# Patient Record
Sex: FEMALE | Race: WHITE | NOT HISPANIC OR LATINO | Employment: OTHER | URBAN - METROPOLITAN AREA
[De-identification: names, ages, dates, MRNs, and addresses within clinical notes are randomized per-mention and may not be internally consistent; named-entity substitution may affect disease eponyms.]

---

## 2017-03-22 ENCOUNTER — APPOINTMENT (OUTPATIENT)
Dept: LAB | Facility: HOSPITAL | Age: 82
End: 2017-03-22

## 2017-03-22 ENCOUNTER — CONSULT (OUTPATIENT)
Dept: ONCOLOGY | Facility: CLINIC | Age: 82
End: 2017-03-22

## 2017-03-22 VITALS
BODY MASS INDEX: 24.94 KG/M2 | TEMPERATURE: 97.9 F | RESPIRATION RATE: 20 BRPM | WEIGHT: 127 LBS | DIASTOLIC BLOOD PRESSURE: 88 MMHG | HEIGHT: 60 IN | SYSTOLIC BLOOD PRESSURE: 169 MMHG | HEART RATE: 63 BPM

## 2017-03-22 DIAGNOSIS — D64.9 NORMOCYTIC ANEMIA: Primary | ICD-10-CM

## 2017-03-22 LAB
ALBUMIN SERPL-MCNC: 4.4 G/DL (ref 3.2–4.8)
ALBUMIN/GLOB SERPL: 1.5 G/DL (ref 1.5–2.5)
ALP SERPL-CCNC: 81 U/L (ref 25–100)
ALT SERPL W P-5'-P-CCNC: 13 U/L (ref 7–40)
ANION GAP SERPL CALCULATED.3IONS-SCNC: 4 MMOL/L (ref 3–11)
AST SERPL-CCNC: 19 U/L (ref 0–33)
BASOPHILS # BLD AUTO: 0.06 10*3/MM3 (ref 0–0.2)
BASOPHILS NFR BLD AUTO: 1 % (ref 0–1)
BILIRUB SERPL-MCNC: 0.3 MG/DL (ref 0.3–1.2)
BUN BLD-MCNC: 28 MG/DL (ref 9–23)
BUN/CREAT SERPL: 21.5 (ref 7–25)
CALCIUM SPEC-SCNC: 9.9 MG/DL (ref 8.7–10.4)
CHLORIDE SERPL-SCNC: 107 MMOL/L (ref 99–109)
CO2 SERPL-SCNC: 31 MMOL/L (ref 20–31)
CREAT BLD-MCNC: 1.3 MG/DL (ref 0.6–1.3)
DEPRECATED RDW RBC AUTO: 53.2 FL (ref 37–54)
EOSINOPHIL # BLD AUTO: 0.09 10*3/MM3 (ref 0.1–0.3)
EOSINOPHIL NFR BLD AUTO: 1.5 % (ref 0–3)
ERYTHROCYTE [DISTWIDTH] IN BLOOD BY AUTOMATED COUNT: 15.2 % (ref 11.3–14.5)
FERRITIN SERPL-MCNC: 19 NG/ML (ref 10–291)
FOLATE SERPL-MCNC: 7.66 NG/ML (ref 3.2–20)
GFR SERPL CREATININE-BSD FRML MDRD: 39 ML/MIN/1.73
GLOBULIN UR ELPH-MCNC: 3 GM/DL
GLUCOSE BLD-MCNC: 138 MG/DL (ref 70–100)
HCT VFR BLD AUTO: 29.1 % (ref 34.5–44)
HGB BLD-MCNC: 8.9 G/DL (ref 11.5–15.5)
IMM GRANULOCYTES # BLD: 0 10*3/MM3 (ref 0–0.03)
IMM GRANULOCYTES NFR BLD: 0 % (ref 0–0.6)
IRON 24H UR-MRATE: 49 MCG/DL (ref 50–175)
IRON SATN MFR SERPL: 12 % (ref 15–50)
LDH SERPL-CCNC: 176 U/L (ref 120–246)
LYMPHOCYTES # BLD AUTO: 1.23 10*3/MM3 (ref 0.6–4.8)
LYMPHOCYTES NFR BLD AUTO: 20.9 % (ref 24–44)
MCH RBC QN AUTO: 29.3 PG (ref 27–31)
MCHC RBC AUTO-ENTMCNC: 30.6 G/DL (ref 32–36)
MCV RBC AUTO: 95.7 FL (ref 80–99)
MONOCYTES # BLD AUTO: 0.5 10*3/MM3 (ref 0–1)
MONOCYTES NFR BLD AUTO: 8.5 % (ref 0–12)
NEUTROPHILS # BLD AUTO: 4.01 10*3/MM3 (ref 1.5–8.3)
NEUTROPHILS NFR BLD AUTO: 68.1 % (ref 41–71)
PLATELET # BLD AUTO: 151 10*3/MM3 (ref 150–450)
PMV BLD AUTO: 11.2 FL (ref 6–12)
POTASSIUM BLD-SCNC: 4.8 MMOL/L (ref 3.5–5.5)
PROT SERPL-MCNC: 7.4 G/DL (ref 5.7–8.2)
RBC # BLD AUTO: 3.04 10*6/MM3 (ref 3.89–5.14)
RETICS/RBC NFR AUTO: 1.42 % (ref 0.5–1.5)
SODIUM BLD-SCNC: 142 MMOL/L (ref 132–146)
TIBC SERPL-MCNC: 401 MCG/DL (ref 250–450)
VIT B12 BLD-MCNC: 365 PG/ML (ref 211–911)
WBC NRBC COR # BLD: 5.89 10*3/MM3 (ref 3.5–10.8)

## 2017-03-22 PROCEDURE — 36415 COLL VENOUS BLD VENIPUNCTURE: CPT | Performed by: INTERNAL MEDICINE

## 2017-03-22 PROCEDURE — 83550 IRON BINDING TEST: CPT | Performed by: INTERNAL MEDICINE

## 2017-03-22 PROCEDURE — 84155 ASSAY OF PROTEIN SERUM: CPT | Performed by: INTERNAL MEDICINE

## 2017-03-22 PROCEDURE — 83615 LACTATE (LD) (LDH) ENZYME: CPT | Performed by: INTERNAL MEDICINE

## 2017-03-22 PROCEDURE — 82607 VITAMIN B-12: CPT | Performed by: INTERNAL MEDICINE

## 2017-03-22 PROCEDURE — 84165 PROTEIN E-PHORESIS SERUM: CPT | Performed by: INTERNAL MEDICINE

## 2017-03-22 PROCEDURE — 85025 COMPLETE CBC W/AUTO DIFF WBC: CPT | Performed by: INTERNAL MEDICINE

## 2017-03-22 PROCEDURE — 83540 ASSAY OF IRON: CPT | Performed by: INTERNAL MEDICINE

## 2017-03-22 PROCEDURE — 99204 OFFICE O/P NEW MOD 45 MIN: CPT | Performed by: INTERNAL MEDICINE

## 2017-03-22 PROCEDURE — 82746 ASSAY OF FOLIC ACID SERUM: CPT | Performed by: INTERNAL MEDICINE

## 2017-03-22 PROCEDURE — 82728 ASSAY OF FERRITIN: CPT | Performed by: INTERNAL MEDICINE

## 2017-03-22 PROCEDURE — 85045 AUTOMATED RETICULOCYTE COUNT: CPT | Performed by: INTERNAL MEDICINE

## 2017-03-22 PROCEDURE — 80053 COMPREHEN METABOLIC PANEL: CPT | Performed by: INTERNAL MEDICINE

## 2017-03-22 PROCEDURE — 82668 ASSAY OF ERYTHROPOIETIN: CPT | Performed by: INTERNAL MEDICINE

## 2017-03-22 PROCEDURE — 86334 IMMUNOFIX E-PHORESIS SERUM: CPT | Performed by: INTERNAL MEDICINE

## 2017-03-22 NOTE — PROGRESS NOTES
Subjective     PROBLEM LIST:  1. Normocytic anemia  2. Hypothyroidism  3. Diabetes with neuropathy  4. Osteoarthritis  5. CAD  6. CKD  7. Depression/ BPAD  8. Seizure disorder      CHIEF COMPLAINT: anemia      HISTORY OF PRESENT ILLNESS:  The patient is a 88 y.o. year old female, referred for evaluation of anemia.  Her daughter reports that they have been dealing with anemia for the past few years.  She has taken iron in the past but is not on it currently.She does report fatigue and   has been weaker with activity.  She had a colonoscopy in February 2016 which was reported as normal.  She also had an EGD in 2016  which did show peptic ulcers which has now been treated.  She has never seen blood in her bowel movements.  She has not had side effects with oral iron.    REVIEW OF SYSTEMS:  A 14 point review of systems was performed and is negative except as noted above.    Past Medical History:   Diagnosis Date   • Anemia    • Anxiety    • Arthritis    • Arthritis    • Depression    • Diabetes mellitus    • Stroke     x4 over a span of about 30 years   • Thyroid disorder    • Ulcer          Current Outpatient Prescriptions on File Prior to Visit   Medication Sig Dispense Refill   • carvedilol (COREG) 12.5 MG tablet Take 6.25 mg by mouth 2 (Two) Times a Day.     • furosemide (LASIX) 40 MG tablet 40 mg 2 (Two) Times a Week. Monday & Thursday     • glipiZIDE (GLUCOTROL) 10 MG tablet Take 10 mg by mouth 2 (Two) Times a Day.     • levothyroxine (SYNTHROID, LEVOTHROID) 50 MCG tablet Take 50 mcg by mouth Daily.     • lithium carbonate 150 MG capsule Take 150 mg by mouth Daily.     • metFORMIN (GLUCOPHAGE) 500 MG tablet Take 500 mg by mouth Daily.     • nitroglycerin (NITROSTAT) 0.4 MG SL tablet Place 0.4 mg under the tongue Every 5 (Five) Minutes As Needed for chest pain. Take no more than 3 doses in 15 minutes.     • pantoprazole (PROTONIX) 40 MG EC tablet Take 40 mg by mouth Daily.     • potassium chloride (MICRO-K) 10  "MEQ CR capsule Take 10 mEq by mouth 2 (Two) Times a Week. Monday & Thursday     • QUEtiapine (SEROquel) 25 MG tablet Take 50 mg by mouth 2 (Two) Times a Day.       No current facility-administered medications on file prior to visit.        Allergies   Allergen Reactions   • Penicillins Hives   • Percodan [Oxycodone-Aspirin]      Headaces       Past Surgical History:   Procedure Laterality Date   • APPENDECTOMY     • ARTERIAL BYPASS SURGERY     • BACK SURGERY     • CARDIAC CATHETERIZATION     • CATARACT EXTRACTION Bilateral    •  SECTION      x4    • CHOLECYSTECTOMY     • COLONOSCOPY  2016   • CORONARY ANGIOPLASTY WITH STENT PLACEMENT     • HEMORRHOIDECTOMY      x2   • HYSTERECTOMY     • RESECTION RIBS EXTRAPLEURAL         Social History     Social History   • Marital status:      Spouse name: N/A   • Number of children: N/A   • Years of education: N/A     Social History Main Topics   • Smoking status: Never Smoker   • Smokeless tobacco: Never Used   • Alcohol use No   • Drug use: No   • Sexual activity: Defer     Other Topics Concern   • None     Social History Narrative   She lives with her  with whom she recently celebrated a 70th wedding anniversary.  She is accompanied by her daughter today.    Family History   Problem Relation Age of Onset   • Diabetes Mother    • Heart disease Mother    • Diabetes Father    • Diabetes Sister    • Heart attack Brother    • Heart disease Sister    • Heart disease Sister    • Heart failure Sister        Objective     /88  Pulse 63  Temp 97.9 °F (36.6 °C) (Temporal Artery )   Resp 20  Ht 60\" (152.4 cm)  Wt 127 lb (57.6 kg)  BMI 24.8 kg/m2  Performance Status: 1  General: well appearing female in no acute distress  Neuro: alert and oriented  HEENT: sclera anicteric, oropharynx clear  Lymphatics: no cervical, supraclavicular, or axillary adenopathy  Cardiovascular: regular rate and rhythm, no murmurs  Lungs: clear to auscultation " bilaterally  Abdomen: soft, nontender, nondistended.  No palpable organomegaly  Extremeties: no lower extremity edema  Skin: no rashes, lesions, bruising, or petechiae  Psych: mood and affect appropriate       Labs: On 2/23/2017 her hemoglobin was 8.5, MCV 93, platelets 134.  Creatinine is 1.37.      Labs obtained today show an LDH of 176, creatinine 1.3, serum iron 49, ferritin 19, iron saturation 12%, B12 365, hemoglobin 8.9, MCV 95, platelets 151.      Assessment/Plan     Erma West is a 88 y.o. year old female Who presents for evaluation of normocytic anemia.  She has had GI evaluation and treatment of peptic ulcers, but remains anemic.  Blood work today does show somewhat low iron levels site it's reasonable to try iron supplementation as a first step.  It is also possible that there is another issue such as myelodysplastic syndrome.  Her renal insufficiency may be playing a role in this as well.  For now I will recommend that she take ferrous sulfate 325 mg twice daily and I will see her back in about a month to recheck her labs.           Kate Dodson MD    3/22/2017

## 2017-03-23 LAB
ALBUMIN SERPL-MCNC: 3.9 G/DL (ref 2.9–4.4)
ALBUMIN/GLOB SERPL: 1.3 {RATIO} (ref 0.7–1.7)
ALPHA1 GLOB FLD ELPH-MCNC: 0.2 G/DL (ref 0–0.4)
ALPHA2 GLOB SERPL ELPH-MCNC: 0.7 G/DL (ref 0.4–1)
B-GLOBULIN SERPL ELPH-MCNC: 1 G/DL (ref 0.7–1.3)
ETHNIC BACKGROUND STATED: 17.4 MIU/ML (ref 2.6–18.5)
GAMMA GLOB SERPL ELPH-MCNC: 1.2 G/DL (ref 0.4–1.8)
GLOBULIN SER CALC-MCNC: 3.1 G/DL (ref 2.2–3.9)
IGA SERPL-MCNC: 134 MG/DL (ref 64–422)
IGG SERPL-MCNC: 929 MG/DL (ref 700–1600)
IGM SERPL-MCNC: 279 MG/DL (ref 26–217)
INTERPRETATION SERPL IEP-IMP: ABNORMAL
Lab: ABNORMAL
M-SPIKE: ABNORMAL G/DL
PROT SERPL-MCNC: 7 G/DL (ref 6–8.5)

## 2017-04-19 ENCOUNTER — RESULTS ENCOUNTER (OUTPATIENT)
Dept: ONCOLOGY | Facility: CLINIC | Age: 82
End: 2017-04-19

## 2017-04-19 ENCOUNTER — OFFICE VISIT (OUTPATIENT)
Dept: ONCOLOGY | Facility: CLINIC | Age: 82
End: 2017-04-19

## 2017-04-19 ENCOUNTER — APPOINTMENT (OUTPATIENT)
Dept: LAB | Facility: HOSPITAL | Age: 82
End: 2017-04-19

## 2017-04-19 ENCOUNTER — TELEPHONE (OUTPATIENT)
Dept: ONCOLOGY | Facility: CLINIC | Age: 82
End: 2017-04-19

## 2017-04-19 VITALS
WEIGHT: 124 LBS | HEIGHT: 60 IN | DIASTOLIC BLOOD PRESSURE: 66 MMHG | SYSTOLIC BLOOD PRESSURE: 159 MMHG | TEMPERATURE: 98.1 F | HEART RATE: 57 BPM | RESPIRATION RATE: 18 BRPM | BODY MASS INDEX: 24.35 KG/M2

## 2017-04-19 DIAGNOSIS — D64.9 NORMOCYTIC ANEMIA: Primary | ICD-10-CM

## 2017-04-19 DIAGNOSIS — D64.9 NORMOCYTIC ANEMIA: ICD-10-CM

## 2017-04-19 DIAGNOSIS — D50.9 IRON DEFICIENCY ANEMIA, UNSPECIFIED IRON DEFICIENCY ANEMIA TYPE: Primary | ICD-10-CM

## 2017-04-19 LAB
ERYTHROCYTE [DISTWIDTH] IN BLOOD BY AUTOMATED COUNT: 16.6 % (ref 11.3–14.5)
FERRITIN SERPL-MCNC: 30 NG/ML (ref 10–291)
HCT VFR BLD AUTO: 28.4 % (ref 34.5–44)
HGB BLD-MCNC: 9.2 G/DL (ref 11.5–15.5)
IRON 24H UR-MRATE: 162 MCG/DL (ref 50–175)
IRON SATN MFR SERPL: 46 % (ref 15–50)
LYMPHOCYTES # BLD AUTO: 1.7 10*3/MM3 (ref 0.6–4.8)
LYMPHOCYTES NFR BLD AUTO: 24.5 % (ref 24–44)
MCH RBC QN AUTO: 29.4 PG (ref 27–31)
MCHC RBC AUTO-ENTMCNC: 32.3 G/DL (ref 32–36)
MCV RBC AUTO: 91 FL (ref 80–99)
MONOCYTES # BLD AUTO: 0.6 10*3/MM3 (ref 0–1)
MONOCYTES NFR BLD AUTO: 9 % (ref 0–12)
NEUTROPHILS # BLD AUTO: 4.5 10*3/MM3 (ref 1.5–8.3)
NEUTROPHILS NFR BLD AUTO: 66.5 % (ref 41–71)
PLATELET # BLD AUTO: 147 10*3/MM3 (ref 150–450)
PMV BLD AUTO: 8 FL (ref 6–12)
RBC # BLD AUTO: 3.12 10*6/MM3 (ref 3.89–5.14)
TIBC SERPL-MCNC: 355 MCG/DL (ref 250–450)
WBC NRBC COR # BLD: 6.8 10*3/MM3 (ref 3.5–10.8)

## 2017-04-19 PROCEDURE — 83540 ASSAY OF IRON: CPT | Performed by: INTERNAL MEDICINE

## 2017-04-19 PROCEDURE — 36415 COLL VENOUS BLD VENIPUNCTURE: CPT | Performed by: INTERNAL MEDICINE

## 2017-04-19 PROCEDURE — 83550 IRON BINDING TEST: CPT | Performed by: INTERNAL MEDICINE

## 2017-04-19 PROCEDURE — 82728 ASSAY OF FERRITIN: CPT | Performed by: INTERNAL MEDICINE

## 2017-04-19 PROCEDURE — 85025 COMPLETE CBC W/AUTO DIFF WBC: CPT | Performed by: INTERNAL MEDICINE

## 2017-04-19 PROCEDURE — 99213 OFFICE O/P EST LOW 20 MIN: CPT | Performed by: INTERNAL MEDICINE

## 2017-04-19 RX ORDER — DICYCLOMINE HYDROCHLORIDE 10 MG/1
10 CAPSULE ORAL 2 TIMES DAILY
COMMUNITY
Start: 2017-04-01

## 2017-04-19 RX ORDER — CARVEDILOL 6.25 MG/1
12.5 TABLET ORAL
COMMUNITY
Start: 2017-03-22 | End: 2018-01-01 | Stop reason: SDDI

## 2017-04-19 RX ORDER — QUETIAPINE FUMARATE 50 MG/1
TABLET, FILM COATED ORAL
COMMUNITY
Start: 2017-04-06 | End: 2017-11-22

## 2017-04-19 NOTE — TELEPHONE ENCOUNTER
I called the patient's daughter regarding the results of today's blood work.  Her iron levels have improved, but she did not have a corresponding improvement in her hemoglobin.  This suggests that there may be more to her anemia than just iron deficiency.  At this point I think a bone marrow biopsy would be unlikely to change our management.  Additionally the patient was pretty clear that she didn't want to have any invasive procedures.  I recommend that we just follow her blood counts for now.  The daughter said she would like to follow up with hematology clinic specifically so I will have her return in about 6 months.

## 2017-04-19 NOTE — PROGRESS NOTES
"      PROBLEM LIST:  1. Normocytic anemia  2. Hypothyroidism  3. Diabetes with neuropathy  4. Osteoarthritis  5. CAD  6. CKD  7. Depression/ BPAD  8. Seizure disorder     Subjective     HISTORY OF PRESENT ILLNESS:   Erma West returns for follow-up.   She has been taking iron twice a day for the past month.  She has not had any side effects with this.  Her energy level has been low.  She spends a lot of the day resting.    Past Medical History, Past Surgical History, Social History, Family History have been reviewed and are without significant changes except as mentioned.    Review of Systems   A comprehensive 14 point review of systems was performed and was negative except as mentioned.    Medications:  The current medication list was reviewed in the EMR    ALLERGIES:    Allergies   Allergen Reactions   • Penicillins Hives   • Percodan [Oxycodone-Aspirin]      Headaces       Objective      /66  Pulse 57  Temp 98.1 °F (36.7 °C)  Resp 18  Ht 60\" (152.4 cm)  Wt 124 lb (56.2 kg)  BMI 24.22 kg/m2     Performance Status: 2    General: well appearing female in no acute distress  Neuro: alert and oriented  HEENT: sclera anicteric, oropharynx clear  Lymphatics: no cervical, supraclavicular, or axillary adenopathy  Cardiovascular: regular rate and rhythm, no murmurs  Lungs: clear to auscultation bilaterally  Abdomen: soft, nontender, nondistended.  No palpable organomegaly  Extremeties: no lower extremity edema  Skin: no rashes, lesions, bruising, or petechiae  Psych: mood and affect appropriate      RECENT LABS:  Blood work was reviewed and is notable for hemoglobin 9.2.  MCV is 91.  Platelets are 147, white count 6.8.          Assessment/Plan   Erma West is a 88 y.o. year old female with normocytic anemia.  She had low iron levels and returns today after a month trial of oral iron.  She has not had any significant improvement in her hemoglobin.  I will wait for her iron levels to return.  If these have " not increased, then I would recommend trying Feraheme.  If her iron levels are increased but her anemia is no better, then there may be more than just iron deficiency is the underlying cause.  I briefly mentioned a bone marrow biopsy and she is not interested in having any invasive procedures, which I think is very reasonable.  I will contact her daughter by phone once the remainder of the labs are available.  Follow-up will be scheduled depending on these results.                  Visit time was 15 minutes, greater than 50% spent in counseling      Kate Dodson MD  [facility]    4/19/2017          CC:

## 2017-10-17 ENCOUNTER — RESULTS ENCOUNTER (OUTPATIENT)
Dept: ONCOLOGY | Facility: CLINIC | Age: 82
End: 2017-10-17

## 2017-10-17 DIAGNOSIS — D50.9 IRON DEFICIENCY ANEMIA, UNSPECIFIED IRON DEFICIENCY ANEMIA TYPE: ICD-10-CM

## 2017-11-22 ENCOUNTER — OFFICE VISIT (OUTPATIENT)
Dept: ONCOLOGY | Facility: CLINIC | Age: 82
End: 2017-11-22

## 2017-11-22 VITALS
BODY MASS INDEX: 23.56 KG/M2 | TEMPERATURE: 97 F | DIASTOLIC BLOOD PRESSURE: 88 MMHG | WEIGHT: 120 LBS | HEART RATE: 70 BPM | RESPIRATION RATE: 16 BRPM | HEIGHT: 60 IN | SYSTOLIC BLOOD PRESSURE: 215 MMHG

## 2017-11-22 DIAGNOSIS — D64.9 NORMOCYTIC ANEMIA: Primary | ICD-10-CM

## 2017-11-22 PROCEDURE — 99213 OFFICE O/P EST LOW 20 MIN: CPT | Performed by: INTERNAL MEDICINE

## 2017-11-22 RX ORDER — LEVOTHYROXINE SODIUM 0.07 MG/1
TABLET ORAL
COMMUNITY
Start: 2017-10-31 | End: 2018-01-01 | Stop reason: SDDI

## 2017-11-22 RX ORDER — QUETIAPINE FUMARATE 100 MG/1
100 TABLET, FILM COATED ORAL NIGHTLY
COMMUNITY
Start: 2017-10-25

## 2017-11-22 NOTE — PROGRESS NOTES
"      PROBLEM LIST:  1. Normocytic anemia  2. Hypothyroidism  3. Diabetes with neuropathy  4. Osteoarthritis  5. CAD  6. CKD  7. Depression/ BPAD  8. Seizure disorder     Subjective     HISTORY OF PRESENT ILLNESS:   Erma West returns for follow-up.   Recently her  has been ill and she has been more tired in caring for him.  However she has been fairly well otherwise.  She continues to take 1 iron pill daily.  She had labs done with Dr. Zepeda last week.    Past Medical History, Past Surgical History, Social History, Family History have been reviewed and are without significant changes except as mentioned.    Review of Systems   A comprehensive 14 point review of systems was performed and was negative except as mentioned.    Medications:  The current medication list was reviewed in the EMR    ALLERGIES:    Allergies   Allergen Reactions   • Penicillins Hives   • Percodan [Oxycodone-Aspirin]      Headaces       Objective      BP (!) 215/88  Pulse 70  Temp 97 °F (36.1 °C)  Resp 16  Ht 60\" (152.4 cm)  Wt 120 lb (54.4 kg)  BMI 23.44 kg/m2     Performance Status: 2    General: well appearing female in no acute distress  Neuro: alert and oriented  HEENT: sclera anicteric, oropharynx clear  Lymphatics: no cervical, supraclavicular, or axillary adenopathy  Cardiovascular: regular rate and rhythm, no murmurs  Lungs: clear to auscultation bilaterally  Abdomen: soft, nontender, nondistended.  No palpable organomegaly  Extremeties: no lower extremity edema  Skin: no rashes, lesions, bruising, or petechiae  Psych: mood and affect appropriate      RECENT LABS:  Recent labs show white count of 7.0, hemoglobin 10.6, platelets 192.  MCV is 94.  Serum iron is 105         Assessment/Plan   Erma West is a 88 y.o. year old female with normocytic anemia. Her hemoglobin today is actually improved compared to 6 months ago.  She continues to take a daily iron supplement.  It is unclear how much of her anemia is related to " iron deficiency, but I think it's reasonable to continue the iron supplement for now.  We discussed a recent study which showed improved iron absorption with every other day dosing, so I would recommend this dosing schedule in the future.    I would recommend she have her CBC checked about every 6 months.  As long as it remains stable and she has no other blood cell count abnormalities, I would not do further workup at this point.  I think she can follow up with Dr. Hernandez.  I would be happy to see her back in the future if there are any significant changes.                Visit time was 15 minutes, greater than 50% spent in counseling      Kate Dodson MD  [facility]    11/22/2017          CC:

## 2018-01-01 ENCOUNTER — OFFICE VISIT (OUTPATIENT)
Dept: ONCOLOGY | Facility: CLINIC | Age: 83
End: 2018-01-01

## 2018-01-01 ENCOUNTER — HOSPITAL ENCOUNTER (OUTPATIENT)
Dept: CT IMAGING | Facility: HOSPITAL | Age: 83
Discharge: HOME | End: 2018-06-08
Attending: FAMILY MEDICINE | Admitting: FAMILY MEDICINE

## 2018-01-01 ENCOUNTER — APPOINTMENT (OUTPATIENT)
Dept: GENERAL RADIOLOGY | Facility: HOSPITAL | Age: 83
End: 2018-01-01

## 2018-01-01 ENCOUNTER — OFFICE VISIT (OUTPATIENT)
Dept: CARDIOLOGY | Facility: CLINIC | Age: 83
End: 2018-01-01

## 2018-01-01 ENCOUNTER — HOSPITAL ENCOUNTER (OUTPATIENT)
Dept: GENERAL RADIOLOGY | Facility: HOSPITAL | Age: 83
Discharge: HOME | End: 2018-10-03
Attending: FAMILY MEDICINE | Admitting: FAMILY MEDICINE

## 2018-01-01 ENCOUNTER — TRANSCRIBE ORDERS (OUTPATIENT)
Dept: ADMINISTRATIVE | Facility: HOSPITAL | Age: 83
End: 2018-01-01

## 2018-01-01 ENCOUNTER — APPOINTMENT (OUTPATIENT)
Dept: CT IMAGING | Facility: HOSPITAL | Age: 83
End: 2018-01-01

## 2018-01-01 ENCOUNTER — HOSPITAL ENCOUNTER (INPATIENT)
Facility: HOSPITAL | Age: 83
LOS: 1 days | Discharge: HOME | End: 2018-04-01
Attending: EMERGENCY MEDICINE | Admitting: HOSPITALIST

## 2018-01-01 ENCOUNTER — HOSPITAL ENCOUNTER (OUTPATIENT)
Dept: CT IMAGING | Facility: HOSPITAL | Age: 83
Discharge: HOME | End: 2018-06-18
Attending: INTERNAL MEDICINE | Admitting: INTERNAL MEDICINE

## 2018-01-01 ENCOUNTER — HOSPITAL ENCOUNTER (OUTPATIENT)
Dept: GENERAL RADIOLOGY | Facility: HOSPITAL | Age: 83
Discharge: HOME | End: 2018-04-09
Attending: FAMILY MEDICINE | Admitting: FAMILY MEDICINE

## 2018-01-01 ENCOUNTER — TELEPHONE (OUTPATIENT)
Dept: ONCOLOGY | Facility: CLINIC | Age: 83
End: 2018-01-01

## 2018-01-01 ENCOUNTER — LAB (OUTPATIENT)
Dept: LAB | Facility: HOSPITAL | Age: 83
End: 2018-01-01

## 2018-01-01 VITALS
TEMPERATURE: 98.2 F | BODY MASS INDEX: 22.34 KG/M2 | HEART RATE: 70 BPM | HEIGHT: 60 IN | SYSTOLIC BLOOD PRESSURE: 193 MMHG | WEIGHT: 113.8 LBS | DIASTOLIC BLOOD PRESSURE: 79 MMHG | OXYGEN SATURATION: 93 % | RESPIRATION RATE: 16 BRPM

## 2018-01-01 VITALS
BODY MASS INDEX: 23.95 KG/M2 | OXYGEN SATURATION: 98 % | WEIGHT: 122 LBS | HEART RATE: 60 BPM | RESPIRATION RATE: 17 BRPM | SYSTOLIC BLOOD PRESSURE: 151 MMHG | HEIGHT: 60 IN | TEMPERATURE: 98.3 F | DIASTOLIC BLOOD PRESSURE: 59 MMHG

## 2018-01-01 VITALS
DIASTOLIC BLOOD PRESSURE: 46 MMHG | SYSTOLIC BLOOD PRESSURE: 122 MMHG | OXYGEN SATURATION: 91 % | HEIGHT: 60 IN | BODY MASS INDEX: 23.16 KG/M2 | WEIGHT: 118 LBS | HEART RATE: 60 BPM

## 2018-01-01 DIAGNOSIS — R63.4 WEIGHT LOSS, NON-INTENTIONAL: Primary | ICD-10-CM

## 2018-01-01 DIAGNOSIS — R10.12 LEFT UPPER QUADRANT PAIN: ICD-10-CM

## 2018-01-01 DIAGNOSIS — S00.93XA CONTUSION OF HEAD, UNSPECIFIED PART OF HEAD, INITIAL ENCOUNTER: ICD-10-CM

## 2018-01-01 DIAGNOSIS — I16.0 HYPERTENSIVE URGENCY: ICD-10-CM

## 2018-01-01 DIAGNOSIS — R63.4 WEIGHT LOSS, NON-INTENTIONAL: ICD-10-CM

## 2018-01-01 DIAGNOSIS — J18.9 PNEUMONIA DUE TO INFECTIOUS ORGANISM, UNSPECIFIED LATERALITY, UNSPECIFIED PART OF LUNG: Primary | ICD-10-CM

## 2018-01-01 DIAGNOSIS — J18.9 PNEUMONIA OF BOTH LUNGS DUE TO INFECTIOUS ORGANISM, UNSPECIFIED PART OF LUNG: Primary | ICD-10-CM

## 2018-01-01 DIAGNOSIS — S00.93XA CONTUSION OF HEAD, UNSPECIFIED PART OF HEAD, INITIAL ENCOUNTER: Primary | ICD-10-CM

## 2018-01-01 DIAGNOSIS — I38 VALVULAR HEART DISEASE: ICD-10-CM

## 2018-01-01 DIAGNOSIS — J18.9 PNEUMONIA DUE TO INFECTIOUS ORGANISM, UNSPECIFIED LATERALITY, UNSPECIFIED PART OF LUNG: ICD-10-CM

## 2018-01-01 DIAGNOSIS — D50.9 IRON DEFICIENCY ANEMIA, UNSPECIFIED IRON DEFICIENCY ANEMIA TYPE: ICD-10-CM

## 2018-01-01 DIAGNOSIS — R94.31 ECG ABNORMALITY: ICD-10-CM

## 2018-01-01 DIAGNOSIS — I25.119 CORONARY ARTERY DISEASE WITH ANGINA PECTORIS, UNSPECIFIED VESSEL OR LESION TYPE, UNSPECIFIED WHETHER NATIVE OR TRANSPLANTED HEART (HCC): Primary | ICD-10-CM

## 2018-01-01 DIAGNOSIS — R07.9 CHEST PAIN, UNSPECIFIED TYPE: ICD-10-CM

## 2018-01-01 LAB
ALBUMIN SERPL-MCNC: 3.8 G/DL (ref 3.2–4.8)
ALBUMIN SERPL-MCNC: 4 G/DL (ref 3.2–4.8)
ALBUMIN SERPL-MCNC: 4.45 G/DL (ref 3.2–4.8)
ALBUMIN SERPL-MCNC: 4.6 G/DL (ref 3.2–4.8)
ALBUMIN/GLOB SERPL: 1.5 G/DL (ref 1.5–2.5)
ALBUMIN/GLOB SERPL: 1.6 G/DL (ref 1.5–2.5)
ALBUMIN/GLOB SERPL: 1.7 G/DL (ref 1.5–2.5)
ALP SERPL-CCNC: 71 U/L (ref 25–100)
ALP SERPL-CCNC: 82 U/L (ref 25–100)
ALP SERPL-CCNC: 84 U/L (ref 25–100)
ALT SERPL W P-5'-P-CCNC: 10 U/L (ref 7–40)
ALT SERPL W P-5'-P-CCNC: 14 U/L (ref 7–40)
ALT SERPL W P-5'-P-CCNC: 9 U/L (ref 7–40)
ANION GAP SERPL CALCULATED.3IONS-SCNC: 11 MMOL/L (ref 3–11)
ANION GAP SERPL CALCULATED.3IONS-SCNC: 4 MMOL/L (ref 3–11)
ANION GAP SERPL CALCULATED.3IONS-SCNC: 6 MMOL/L (ref 3–11)
ANION GAP SERPL CALCULATED.3IONS-SCNC: 7 MMOL/L (ref 3–11)
ARTICHOKE IGE QN: 88 MG/DL (ref 0–130)
AST SERPL-CCNC: 14 U/L (ref 0–33)
AST SERPL-CCNC: 15 U/L (ref 0–33)
AST SERPL-CCNC: 9 U/L (ref 0–33)
BACTERIA FLD CULT: NORMAL
BACTERIA SPEC AEROBE CULT: NORMAL
BACTERIA SPEC AEROBE CULT: NORMAL
BACTERIA UR QL AUTO: ABNORMAL /HPF
BASOPHILS # BLD AUTO: 0.05 10*3/MM3 (ref 0–0.2)
BASOPHILS # BLD AUTO: 0.07 10*3/MM3 (ref 0–0.2)
BASOPHILS # BLD AUTO: 0.1 10*3/MM3 (ref 0–0.2)
BASOPHILS NFR BLD AUTO: 0.6 % (ref 0–1)
BASOPHILS NFR BLD AUTO: 0.7 % (ref 0–1)
BASOPHILS NFR BLD AUTO: 0.7 % (ref 0–1)
BILIRUB SERPL-MCNC: 0.4 MG/DL (ref 0.3–1.2)
BILIRUB SERPL-MCNC: 0.5 MG/DL (ref 0.3–1.2)
BILIRUB SERPL-MCNC: 0.9 MG/DL (ref 0.3–1.2)
BILIRUB UR QL STRIP: NEGATIVE
BUN BLD-MCNC: 25 MG/DL (ref 9–23)
BUN BLD-MCNC: 25 MG/DL (ref 9–23)
BUN BLD-MCNC: 26 MG/DL (ref 9–23)
BUN BLD-MCNC: 35 MG/DL (ref 9–23)
BUN/CREAT SERPL: 20.8 (ref 7–25)
BUN/CREAT SERPL: 20.8 (ref 7–25)
BUN/CREAT SERPL: 21.7 (ref 7–25)
BUN/CREAT SERPL: 23.3 (ref 7–25)
CALCIUM SPEC-SCNC: 8.6 MG/DL (ref 8.7–10.4)
CALCIUM SPEC-SCNC: 8.8 MG/DL (ref 8.7–10.4)
CALCIUM SPEC-SCNC: 9.1 MG/DL (ref 8.7–10.4)
CALCIUM SPEC-SCNC: 9.4 MG/DL (ref 8.7–10.4)
CHLORIDE SERPL-SCNC: 105 MMOL/L (ref 99–109)
CHLORIDE SERPL-SCNC: 107 MMOL/L (ref 99–109)
CHLORIDE SERPL-SCNC: 108 MMOL/L (ref 99–109)
CHLORIDE SERPL-SCNC: 108 MMOL/L (ref 99–109)
CHOLEST SERPL-MCNC: 132 MG/DL (ref 0–200)
CLARITY UR: CLEAR
CO2 SERPL-SCNC: 23 MMOL/L (ref 20–31)
CO2 SERPL-SCNC: 24 MMOL/L (ref 20–31)
CO2 SERPL-SCNC: 24 MMOL/L (ref 20–31)
CO2 SERPL-SCNC: 25 MMOL/L (ref 20–31)
COLOR UR: YELLOW
CREAT BLD-MCNC: 1.2 MG/DL (ref 0.6–1.3)
CREAT BLD-MCNC: 1.5 MG/DL (ref 0.6–1.3)
DEPRECATED RDW RBC AUTO: 49.8 FL (ref 37–54)
DEPRECATED RDW RBC AUTO: 51.4 FL (ref 37–54)
DEPRECATED RDW RBC AUTO: 51.8 FL (ref 37–54)
EOSINOPHIL # BLD AUTO: 0.06 10*3/MM3 (ref 0–0.3)
EOSINOPHIL # BLD AUTO: 0.12 10*3/MM3 (ref 0–0.3)
EOSINOPHIL # BLD AUTO: 0.19 10*3/MM3 (ref 0–0.3)
EOSINOPHIL NFR BLD AUTO: 0.7 % (ref 0–3)
EOSINOPHIL NFR BLD AUTO: 0.9 % (ref 0–3)
EOSINOPHIL NFR BLD AUTO: 2 % (ref 0–3)
ERYTHROCYTE [DISTWIDTH] IN BLOOD BY AUTOMATED COUNT: 14.1 % (ref 11.3–14.5)
ERYTHROCYTE [DISTWIDTH] IN BLOOD BY AUTOMATED COUNT: 14.4 % (ref 11.3–14.5)
ERYTHROCYTE [DISTWIDTH] IN BLOOD BY AUTOMATED COUNT: 14.5 % (ref 11.3–14.5)
FERRITIN SERPL-MCNC: 66 NG/ML (ref 10–291)
FERRITIN SERPL-MCNC: 81 NG/ML (ref 10–291)
GFR SERPL CREATININE-BSD FRML MDRD: 33 ML/MIN/1.73
GFR SERPL CREATININE-BSD FRML MDRD: 42 ML/MIN/1.73
GLOBULIN UR ELPH-MCNC: 2.5 GM/DL
GLOBULIN UR ELPH-MCNC: 2.7 GM/DL
GLOBULIN UR ELPH-MCNC: 2.9 GM/DL
GLUCOSE BLD-MCNC: 142 MG/DL (ref 70–100)
GLUCOSE BLD-MCNC: 169 MG/DL (ref 70–100)
GLUCOSE BLD-MCNC: 179 MG/DL (ref 70–100)
GLUCOSE BLD-MCNC: 215 MG/DL (ref 70–100)
GLUCOSE BLDC GLUCOMTR-MCNC: 111 MG/DL (ref 70–130)
GLUCOSE BLDC GLUCOMTR-MCNC: 115 MG/DL (ref 70–130)
GLUCOSE BLDC GLUCOMTR-MCNC: 134 MG/DL (ref 70–130)
GLUCOSE UR STRIP-MCNC: NEGATIVE MG/DL
HBA1C MFR BLD: 5.6 % (ref 4.8–5.6)
HCT VFR BLD AUTO: 26.6 % (ref 34.5–44)
HCT VFR BLD AUTO: 29.9 % (ref 34.5–44)
HCT VFR BLD AUTO: 33.8 % (ref 34.5–44)
HDLC SERPL-MCNC: 37 MG/DL (ref 40–60)
HGB BLD-MCNC: 10.5 G/DL (ref 11.5–15.5)
HGB BLD-MCNC: 8.3 G/DL (ref 11.5–15.5)
HGB BLD-MCNC: 9.7 G/DL (ref 11.5–15.5)
HGB UR QL STRIP.AUTO: ABNORMAL
HOLD SPECIMEN: NORMAL
HOLD SPECIMEN: NORMAL
HYALINE CASTS UR QL AUTO: ABNORMAL /LPF
IMM GRANULOCYTES # BLD: 0.02 10*3/MM3 (ref 0–0.03)
IMM GRANULOCYTES # BLD: 0.03 10*3/MM3 (ref 0–0.03)
IMM GRANULOCYTES # BLD: 0.03 10*3/MM3 (ref 0–0.03)
IMM GRANULOCYTES NFR BLD: 0.2 % (ref 0–0.6)
IMM GRANULOCYTES NFR BLD: 0.2 % (ref 0–0.6)
IMM GRANULOCYTES NFR BLD: 0.4 % (ref 0–0.6)
INR PPP: 1.11 (ref 0.91–1.09)
IRON 24H UR-MRATE: 26 MCG/DL (ref 50–175)
IRON 24H UR-MRATE: 50 MCG/DL (ref 50–175)
IRON SATN MFR SERPL: 16 % (ref 15–50)
IRON SATN MFR SERPL: 9 % (ref 15–50)
KETONES UR QL STRIP: NEGATIVE
L PNEUMO1 AG UR QL IA: NEGATIVE
LDH SERPL-CCNC: 153 U/L (ref 120–246)
LEUKOCYTE ESTERASE UR QL STRIP.AUTO: NEGATIVE
LITHIUM SERPL-SCNC: 0.8 MMOL/L (ref 0.6–1.2)
LYMPHOCYTES # BLD AUTO: 1.28 10*3/MM3 (ref 0.6–4.8)
LYMPHOCYTES # BLD AUTO: 1.86 10*3/MM3 (ref 0.6–4.8)
LYMPHOCYTES # BLD AUTO: 2.35 10*3/MM3 (ref 0.6–4.8)
LYMPHOCYTES NFR BLD AUTO: 13.2 % (ref 24–44)
LYMPHOCYTES NFR BLD AUTO: 15.3 % (ref 24–44)
LYMPHOCYTES NFR BLD AUTO: 24.7 % (ref 24–44)
Lab: NORMAL
MAGNESIUM SERPL-MCNC: 2.6 MG/DL (ref 1.3–2.7)
MCH RBC QN AUTO: 29.9 PG (ref 27–31)
MCH RBC QN AUTO: 30.7 PG (ref 27–31)
MCH RBC QN AUTO: 31.8 PG (ref 27–31)
MCHC RBC AUTO-ENTMCNC: 31.1 G/DL (ref 32–36)
MCHC RBC AUTO-ENTMCNC: 31.2 G/DL (ref 32–36)
MCHC RBC AUTO-ENTMCNC: 32.4 G/DL (ref 32–36)
MCV RBC AUTO: 96.3 FL (ref 80–99)
MCV RBC AUTO: 98 FL (ref 80–99)
MCV RBC AUTO: 98.5 FL (ref 80–99)
MONOCYTES # BLD AUTO: 0.61 10*3/MM3 (ref 0–1)
MONOCYTES # BLD AUTO: 0.81 10*3/MM3 (ref 0–1)
MONOCYTES # BLD AUTO: 1.01 10*3/MM3 (ref 0–1)
MONOCYTES NFR BLD AUTO: 7.2 % (ref 0–12)
MONOCYTES NFR BLD AUTO: 7.3 % (ref 0–12)
MONOCYTES NFR BLD AUTO: 8.5 % (ref 0–12)
NEUTROPHILS # BLD AUTO: 10.96 10*3/MM3 (ref 1.5–8.3)
NEUTROPHILS # BLD AUTO: 6.1 10*3/MM3 (ref 1.5–8.3)
NEUTROPHILS # BLD AUTO: 6.35 10*3/MM3 (ref 1.5–8.3)
NEUTROPHILS NFR BLD AUTO: 64.1 % (ref 41–71)
NEUTROPHILS NFR BLD AUTO: 75.7 % (ref 41–71)
NEUTROPHILS NFR BLD AUTO: 77.8 % (ref 41–71)
NITRITE UR QL STRIP: NEGATIVE
ORGANISM ID: NORMAL
PH UR STRIP.AUTO: 6 [PH] (ref 5–8)
PHOSPHATE SERPL-MCNC: 3.3 MG/DL (ref 2.4–5.1)
PLATELET # BLD AUTO: 120 10*3/MM3 (ref 150–450)
PLATELET # BLD AUTO: 177 10*3/MM3 (ref 150–450)
PLATELET # BLD AUTO: 217 10*3/MM3 (ref 150–450)
PMV BLD AUTO: 11.3 FL (ref 6–12)
PMV BLD AUTO: 11.4 FL (ref 6–12)
PMV BLD AUTO: 11.8 FL (ref 6–12)
POTASSIUM BLD-SCNC: 4.3 MMOL/L (ref 3.5–5.5)
POTASSIUM BLD-SCNC: 4.3 MMOL/L (ref 3.5–5.5)
POTASSIUM BLD-SCNC: 4.5 MMOL/L (ref 3.5–5.5)
POTASSIUM BLD-SCNC: 4.6 MMOL/L (ref 3.5–5.5)
PROT SERPL-MCNC: 6.3 G/DL (ref 5.7–8.2)
PROT SERPL-MCNC: 7.1 G/DL (ref 5.7–8.2)
PROT SERPL-MCNC: 7.5 G/DL (ref 5.7–8.2)
PROT UR QL STRIP: ABNORMAL
PROTHROMBIN TIME: 11.7 SECONDS (ref 9.6–11.5)
RBC # BLD AUTO: 2.7 10*6/MM3 (ref 3.89–5.14)
RBC # BLD AUTO: 3.05 10*6/MM3 (ref 3.89–5.14)
RBC # BLD AUTO: 3.51 10*6/MM3 (ref 3.89–5.14)
RBC # UR: ABNORMAL /HPF
REF LAB TEST METHOD: ABNORMAL
RETICS/RBC NFR AUTO: 1.91 % (ref 0.5–1.5)
S PNEUM AG SPEC QL LA: NEGATIVE
SODIUM BLD-SCNC: 135 MMOL/L (ref 132–146)
SODIUM BLD-SCNC: 138 MMOL/L (ref 132–146)
SODIUM BLD-SCNC: 139 MMOL/L (ref 132–146)
SODIUM BLD-SCNC: 140 MMOL/L (ref 132–146)
SP GR UR STRIP: 1.01 (ref 1–1.03)
SPECIMEN SOURCE: NORMAL
SQUAMOUS #/AREA URNS HPF: ABNORMAL /HPF
TIBC SERPL-MCNC: 298 MCG/DL (ref 250–450)
TIBC SERPL-MCNC: 313 MCG/DL (ref 250–450)
TRIGL SERPL-MCNC: 98 MG/DL (ref 0–150)
TROPONIN I SERPL-MCNC: 0 NG/ML (ref 0–0.07)
TROPONIN I SERPL-MCNC: 0.01 NG/ML
UROBILINOGEN UR QL STRIP: ABNORMAL
WBC NRBC COR # BLD: 14.08 10*3/MM3 (ref 3.5–10.8)
WBC NRBC COR # BLD: 8.38 10*3/MM3 (ref 3.5–10.8)
WBC NRBC COR # BLD: 9.52 10*3/MM3 (ref 3.5–10.8)
WBC UR QL AUTO: ABNORMAL /HPF
WHOLE BLOOD HOLD SPECIMEN: NORMAL
WHOLE BLOOD HOLD SPECIMEN: NORMAL

## 2018-01-01 PROCEDURE — 82962 GLUCOSE BLOOD TEST: CPT

## 2018-01-01 PROCEDURE — 71046 X-RAY EXAM CHEST 2 VIEWS: CPT

## 2018-01-01 PROCEDURE — 83550 IRON BINDING TEST: CPT | Performed by: HOSPITALIST

## 2018-01-01 PROCEDURE — 99239 HOSP IP/OBS DSCHRG MGMT >30: CPT | Performed by: HOSPITALIST

## 2018-01-01 PROCEDURE — 87449 NOS EACH ORGANISM AG IA: CPT | Performed by: INTERNAL MEDICINE

## 2018-01-01 PROCEDURE — 63710000001 BARIUM 2 % SUSPENSION: Performed by: INTERNAL MEDICINE

## 2018-01-01 PROCEDURE — 80053 COMPREHEN METABOLIC PANEL: CPT | Performed by: INTERNAL MEDICINE

## 2018-01-01 PROCEDURE — 93010 ELECTROCARDIOGRAM REPORT: CPT | Performed by: INTERNAL MEDICINE

## 2018-01-01 PROCEDURE — P9612 CATHETERIZE FOR URINE SPEC: HCPCS

## 2018-01-01 PROCEDURE — 71250 CT THORAX DX C-: CPT

## 2018-01-01 PROCEDURE — 83550 IRON BINDING TEST: CPT

## 2018-01-01 PROCEDURE — 85025 COMPLETE CBC W/AUTO DIFF WBC: CPT | Performed by: INTERNAL MEDICINE

## 2018-01-01 PROCEDURE — 74176 CT ABD & PELVIS W/O CONTRAST: CPT

## 2018-01-01 PROCEDURE — 80053 COMPREHEN METABOLIC PANEL: CPT

## 2018-01-01 PROCEDURE — 87040 BLOOD CULTURE FOR BACTERIA: CPT | Performed by: EMERGENCY MEDICINE

## 2018-01-01 PROCEDURE — 80053 COMPREHEN METABOLIC PANEL: CPT | Performed by: EMERGENCY MEDICINE

## 2018-01-01 PROCEDURE — 80069 RENAL FUNCTION PANEL: CPT | Performed by: HOSPITALIST

## 2018-01-01 PROCEDURE — 99222 1ST HOSP IP/OBS MODERATE 55: CPT | Performed by: INTERNAL MEDICINE

## 2018-01-01 PROCEDURE — 87899 AGENT NOS ASSAY W/OPTIC: CPT | Performed by: INTERNAL MEDICINE

## 2018-01-01 PROCEDURE — 84484 ASSAY OF TROPONIN QUANT: CPT | Performed by: INTERNAL MEDICINE

## 2018-01-01 PROCEDURE — 85025 COMPLETE CBC W/AUTO DIFF WBC: CPT | Performed by: EMERGENCY MEDICINE

## 2018-01-01 PROCEDURE — 83540 ASSAY OF IRON: CPT

## 2018-01-01 PROCEDURE — 85045 AUTOMATED RETICULOCYTE COUNT: CPT

## 2018-01-01 PROCEDURE — 93005 ELECTROCARDIOGRAM TRACING: CPT | Performed by: INTERNAL MEDICINE

## 2018-01-01 PROCEDURE — 80178 ASSAY OF LITHIUM: CPT | Performed by: HOSPITALIST

## 2018-01-01 PROCEDURE — 85025 COMPLETE CBC W/AUTO DIFF WBC: CPT

## 2018-01-01 PROCEDURE — 83036 HEMOGLOBIN GLYCOSYLATED A1C: CPT | Performed by: INTERNAL MEDICINE

## 2018-01-01 PROCEDURE — 83615 LACTATE (LD) (LDH) ENZYME: CPT

## 2018-01-01 PROCEDURE — 93005 ELECTROCARDIOGRAM TRACING: CPT | Performed by: EMERGENCY MEDICINE

## 2018-01-01 PROCEDURE — 80061 LIPID PANEL: CPT | Performed by: INTERNAL MEDICINE

## 2018-01-01 PROCEDURE — 93005 ELECTROCARDIOGRAM TRACING: CPT | Performed by: HOSPITALIST

## 2018-01-01 PROCEDURE — 83735 ASSAY OF MAGNESIUM: CPT | Performed by: HOSPITALIST

## 2018-01-01 PROCEDURE — 36415 COLL VENOUS BLD VENIPUNCTURE: CPT

## 2018-01-01 PROCEDURE — 99223 1ST HOSP IP/OBS HIGH 75: CPT | Performed by: INTERNAL MEDICINE

## 2018-01-01 PROCEDURE — 82728 ASSAY OF FERRITIN: CPT

## 2018-01-01 PROCEDURE — 84484 ASSAY OF TROPONIN QUANT: CPT

## 2018-01-01 PROCEDURE — 83540 ASSAY OF IRON: CPT | Performed by: HOSPITALIST

## 2018-01-01 PROCEDURE — 99285 EMERGENCY DEPT VISIT HI MDM: CPT

## 2018-01-01 PROCEDURE — 85610 PROTHROMBIN TIME: CPT | Performed by: EMERGENCY MEDICINE

## 2018-01-01 PROCEDURE — 25010000003 CEFTRIAXONE PER 250 MG: Performed by: EMERGENCY MEDICINE

## 2018-01-01 PROCEDURE — 81001 URINALYSIS AUTO W/SCOPE: CPT | Performed by: EMERGENCY MEDICINE

## 2018-01-01 PROCEDURE — 25010000002 AZITHROMYCIN: Performed by: EMERGENCY MEDICINE

## 2018-01-01 PROCEDURE — 71045 X-RAY EXAM CHEST 1 VIEW: CPT

## 2018-01-01 PROCEDURE — 99214 OFFICE O/P EST MOD 30 MIN: CPT | Performed by: INTERNAL MEDICINE

## 2018-01-01 PROCEDURE — 70450 CT HEAD/BRAIN W/O DYE: CPT

## 2018-01-01 PROCEDURE — 99213 OFFICE O/P EST LOW 20 MIN: CPT | Performed by: INTERNAL MEDICINE

## 2018-01-01 PROCEDURE — A9270 NON-COVERED ITEM OR SERVICE: HCPCS | Performed by: INTERNAL MEDICINE

## 2018-01-01 PROCEDURE — 82728 ASSAY OF FERRITIN: CPT | Performed by: HOSPITALIST

## 2018-01-01 RX ORDER — ASPIRIN 81 MG/1
324 TABLET, CHEWABLE ORAL ONCE
Status: COMPLETED | OUTPATIENT
Start: 2018-01-01 | End: 2018-01-01

## 2018-01-01 RX ORDER — NITROGLYCERIN 0.4 MG/1
0.4 TABLET SUBLINGUAL
Status: DISCONTINUED | OUTPATIENT
Start: 2018-01-01 | End: 2018-01-01 | Stop reason: HOSPADM

## 2018-01-01 RX ORDER — CEFTRIAXONE SODIUM 2 G/50ML
2 INJECTION, SOLUTION INTRAVENOUS ONCE
Status: COMPLETED | OUTPATIENT
Start: 2018-01-01 | End: 2018-01-01

## 2018-01-01 RX ORDER — BISACODYL 10 MG
10 SUPPOSITORY, RECTAL RECTAL DAILY PRN
Status: DISCONTINUED | OUTPATIENT
Start: 2018-01-01 | End: 2018-01-01 | Stop reason: HOSPADM

## 2018-01-01 RX ORDER — LEVOTHYROXINE SODIUM 0.07 MG/1
75 TABLET ORAL
Status: DISCONTINUED | OUTPATIENT
Start: 2018-01-01 | End: 2018-01-01 | Stop reason: HOSPADM

## 2018-01-01 RX ORDER — CARVEDILOL 6.25 MG/1
6.25 TABLET ORAL EVERY 12 HOURS SCHEDULED
Status: DISCONTINUED | OUTPATIENT
Start: 2018-01-01 | End: 2018-01-01 | Stop reason: HOSPADM

## 2018-01-01 RX ORDER — DOXYCYCLINE 100 MG/1
100 CAPSULE ORAL EVERY 12 HOURS SCHEDULED
Status: DISCONTINUED | OUTPATIENT
Start: 2018-01-01 | End: 2018-01-01 | Stop reason: HOSPADM

## 2018-01-01 RX ORDER — NIFEDIPINE 30 MG/1
30 TABLET, FILM COATED, EXTENDED RELEASE ORAL
Qty: 30 TABLET | Refills: 2 | Status: SHIPPED | OUTPATIENT
Start: 2018-01-01 | End: 2018-01-01 | Stop reason: SDDI

## 2018-01-01 RX ORDER — SODIUM CHLORIDE 0.9 % (FLUSH) 0.9 %
10 SYRINGE (ML) INJECTION AS NEEDED
Status: DISCONTINUED | OUTPATIENT
Start: 2018-01-01 | End: 2018-01-01 | Stop reason: HOSPADM

## 2018-01-01 RX ORDER — LISINOPRIL 10 MG/1
10 TABLET ORAL
Status: DISCONTINUED | OUTPATIENT
Start: 2018-01-01 | End: 2018-01-01

## 2018-01-01 RX ORDER — HYDROCORTISONE ACETATE 25 MG/1
25 SUPPOSITORY RECTAL 2 TIMES DAILY
Qty: 24 SUPPOSITORY | Refills: 1 | Status: SHIPPED | OUTPATIENT
Start: 2018-01-01 | End: 2019-01-01

## 2018-01-01 RX ORDER — QUETIAPINE FUMARATE 100 MG/1
100 TABLET, FILM COATED ORAL NIGHTLY
Status: DISCONTINUED | OUTPATIENT
Start: 2018-01-01 | End: 2018-01-01

## 2018-01-01 RX ORDER — QUETIAPINE FUMARATE 25 MG/1
75 TABLET, FILM COATED ORAL NIGHTLY
Status: DISCONTINUED | OUTPATIENT
Start: 2018-01-01 | End: 2018-01-01

## 2018-01-01 RX ORDER — CEFTRIAXONE SODIUM 1 G/50ML
1 INJECTION, SOLUTION INTRAVENOUS EVERY 24 HOURS
Status: DISCONTINUED | OUTPATIENT
Start: 2018-01-01 | End: 2018-01-01 | Stop reason: HOSPADM

## 2018-01-01 RX ORDER — QUETIAPINE FUMARATE 100 MG/1
100 TABLET, FILM COATED ORAL NIGHTLY
Status: DISCONTINUED | OUTPATIENT
Start: 2018-01-01 | End: 2018-01-01 | Stop reason: HOSPADM

## 2018-01-01 RX ORDER — GLIMEPIRIDE 1 MG/1
1 TABLET ORAL
COMMUNITY

## 2018-01-01 RX ORDER — CARVEDILOL 12.5 MG/1
12.5 TABLET ORAL 2 TIMES DAILY WITH MEALS
Status: ON HOLD | COMMUNITY
End: 2019-01-01 | Stop reason: SDUPTHER

## 2018-01-01 RX ORDER — IPRATROPIUM BROMIDE AND ALBUTEROL SULFATE 2.5; .5 MG/3ML; MG/3ML
3 SOLUTION RESPIRATORY (INHALATION) EVERY 4 HOURS PRN
Status: DISCONTINUED | OUTPATIENT
Start: 2018-01-01 | End: 2018-01-01 | Stop reason: HOSPADM

## 2018-01-01 RX ORDER — NITROGLYCERIN 20 MG/100ML
10-50 INJECTION INTRAVENOUS
Status: DISCONTINUED | OUTPATIENT
Start: 2018-01-01 | End: 2018-01-01

## 2018-01-01 RX ORDER — POTASSIUM CHLORIDE 750 MG/1
10 CAPSULE, EXTENDED RELEASE ORAL 2 TIMES WEEKLY
Status: DISCONTINUED | OUTPATIENT
Start: 2018-01-01 | End: 2018-01-01 | Stop reason: HOSPADM

## 2018-01-01 RX ORDER — LITHIUM 8 MEQ/5ML
150 SOLUTION ORAL DAILY
Status: DISCONTINUED | OUTPATIENT
Start: 2018-01-01 | End: 2018-01-01

## 2018-01-01 RX ORDER — FAMOTIDINE 20 MG/1
20 TABLET, FILM COATED ORAL DAILY
Status: DISCONTINUED | OUTPATIENT
Start: 2018-01-01 | End: 2018-01-01 | Stop reason: HOSPADM

## 2018-01-01 RX ORDER — LISINOPRIL 20 MG/1
20 TABLET ORAL
Status: DISCONTINUED | OUTPATIENT
Start: 2018-01-01 | End: 2018-01-01

## 2018-01-01 RX ORDER — DOXYCYCLINE 100 MG/1
100 CAPSULE ORAL EVERY 12 HOURS SCHEDULED
Qty: 6 CAPSULE | Refills: 0 | Status: SHIPPED | OUTPATIENT
Start: 2018-01-01 | End: 2018-01-01

## 2018-01-01 RX ORDER — SODIUM CHLORIDE 0.9 % (FLUSH) 0.9 %
1-10 SYRINGE (ML) INJECTION AS NEEDED
Status: DISCONTINUED | OUTPATIENT
Start: 2018-01-01 | End: 2018-01-01 | Stop reason: HOSPADM

## 2018-01-01 RX ORDER — DICYCLOMINE HYDROCHLORIDE 10 MG/1
10 CAPSULE ORAL 3 TIMES DAILY
Status: DISCONTINUED | OUTPATIENT
Start: 2018-01-01 | End: 2018-01-01 | Stop reason: HOSPADM

## 2018-01-01 RX ORDER — NIFEDIPINE 30 MG/1
30 TABLET, EXTENDED RELEASE ORAL
Status: DISCONTINUED | OUTPATIENT
Start: 2018-01-01 | End: 2018-01-01 | Stop reason: HOSPADM

## 2018-01-01 RX ORDER — LEVOTHYROXINE SODIUM 88 UG/1
88 TABLET ORAL DAILY
COMMUNITY

## 2018-01-01 RX ORDER — HYDROCORTISONE ACETATE 25 MG/1
25 SUPPOSITORY RECTAL 2 TIMES DAILY
Status: DISCONTINUED | OUTPATIENT
Start: 2018-01-01 | End: 2018-01-01 | Stop reason: HOSPADM

## 2018-01-01 RX ADMIN — ASPIRIN 81 MG 324 MG: 81 TABLET ORAL at 20:49

## 2018-01-01 RX ADMIN — DICYCLOMINE HYDROCHLORIDE 10 MG: 10 CAPSULE ORAL at 21:30

## 2018-01-01 RX ADMIN — CARVEDILOL 6.25 MG: 6.25 TABLET, FILM COATED ORAL at 12:13

## 2018-01-01 RX ADMIN — HYDROCORTISONE ACETATE 25 MG: 25 SUPPOSITORY RECTAL at 08:57

## 2018-01-01 RX ADMIN — QUETIAPINE FUMARATE 75 MG: 25 TABLET ORAL at 21:29

## 2018-01-01 RX ADMIN — LISINOPRIL 10 MG: 10 TABLET ORAL at 21:30

## 2018-01-01 RX ADMIN — LEVOTHYROXINE SODIUM 75 MCG: 75 TABLET ORAL at 05:10

## 2018-01-01 RX ADMIN — CARVEDILOL 6.25 MG: 6.25 TABLET, FILM COATED ORAL at 08:52

## 2018-01-01 RX ADMIN — AZITHROMYCIN MONOHYDRATE 500 MG: 500 INJECTION, POWDER, LYOPHILIZED, FOR SOLUTION INTRAVENOUS at 23:48

## 2018-01-01 RX ADMIN — HYDROCORTISONE ACETATE 25 MG: 25 SUPPOSITORY RECTAL at 19:05

## 2018-01-01 RX ADMIN — BARIUM SULFATE 450 ML: 21 SUSPENSION ORAL at 14:30

## 2018-01-01 RX ADMIN — DOXYCYCLINE 100 MG: 100 CAPSULE ORAL at 12:14

## 2018-01-01 RX ADMIN — LEVOTHYROXINE SODIUM 75 MCG: 75 TABLET ORAL at 06:13

## 2018-01-01 RX ADMIN — CEFTRIAXONE SODIUM 2 G: 2 INJECTION, SOLUTION INTRAVENOUS at 23:02

## 2018-01-01 RX ADMIN — DOXYCYCLINE 100 MG: 100 CAPSULE ORAL at 08:53

## 2018-01-01 RX ADMIN — DICYCLOMINE HYDROCHLORIDE 10 MG: 10 CAPSULE ORAL at 08:53

## 2018-01-01 RX ADMIN — FAMOTIDINE 20 MG: 20 TABLET, FILM COATED ORAL at 08:53

## 2018-01-01 RX ADMIN — LISINOPRIL 20 MG: 20 TABLET ORAL at 08:53

## 2018-01-01 RX ADMIN — FAMOTIDINE 20 MG: 20 TABLET, FILM COATED ORAL at 12:14

## 2018-01-01 RX ADMIN — DOXYCYCLINE 100 MG: 100 CAPSULE ORAL at 21:29

## 2018-01-01 RX ADMIN — CARVEDILOL 6.25 MG: 6.25 TABLET, FILM COATED ORAL at 01:45

## 2018-01-01 RX ADMIN — QUETIAPINE FUMARATE 100 MG: 100 TABLET ORAL at 01:45

## 2018-01-01 RX ADMIN — DICYCLOMINE HYDROCHLORIDE 10 MG: 10 CAPSULE ORAL at 12:14

## 2018-01-01 RX ADMIN — NIFEDIPINE 30 MG: 30 TABLET, FILM COATED, EXTENDED RELEASE ORAL at 12:22

## 2018-01-01 RX ADMIN — DICYCLOMINE HYDROCHLORIDE 10 MG: 10 CAPSULE ORAL at 19:00

## 2018-01-01 RX ADMIN — CARVEDILOL 6.25 MG: 6.25 TABLET, FILM COATED ORAL at 21:30

## 2018-03-31 PROBLEM — E03.9 HYPOTHYROIDISM: Status: ACTIVE | Noted: 2018-01-01

## 2018-03-31 PROBLEM — I38 VALVULAR HEART DISEASE: Status: ACTIVE | Noted: 2018-01-01

## 2018-03-31 PROBLEM — F41.9 ANXIETY: Status: ACTIVE | Noted: 2018-01-01

## 2018-03-31 PROBLEM — D72.829 LEUKOCYTOSIS: Status: ACTIVE | Noted: 2018-01-01

## 2018-03-31 PROBLEM — I25.10 CAD (CORONARY ARTERY DISEASE): Status: ACTIVE | Noted: 2018-01-01

## 2018-03-31 PROBLEM — J18.9 MULTIFOCAL PNEUMONIA: Status: ACTIVE | Noted: 2018-01-01

## 2018-03-31 PROBLEM — R07.9 CHEST PAIN: Status: ACTIVE | Noted: 2018-01-01

## 2018-03-31 PROBLEM — Z86.73 HISTORY OF CVA (CEREBROVASCULAR ACCIDENT): Status: ACTIVE | Noted: 2018-01-01

## 2018-03-31 PROBLEM — J18.9 PNEUMONIA OF BOTH LUNGS DUE TO INFECTIOUS ORGANISM: Status: ACTIVE | Noted: 2018-01-01

## 2018-03-31 PROBLEM — E11.9 TYPE 2 DIABETES MELLITUS (HCC): Status: ACTIVE | Noted: 2018-01-01

## 2018-04-01 PROBLEM — J18.9 MULTIFOCAL PNEUMONIA: Status: ACTIVE | Noted: 2018-01-01

## 2018-04-01 NOTE — DISCHARGE SUMMARY
[facility]  DISCHARGE SUMMARY    Patient Name: Erma West  : 1929  MRN: [id number]    Date of Admission: 3/30/2018  Date of Discharge:  2018  Length of Stay: 1  Primary Care Physician: Rodrigo Martínez MD    Consults     Date and Time Order Name Status Description    3/31/2018 0111 Inpatient Cardiology Consult Completed         Hospital Course     Presenting Problem:   Pneumonia of both lungs due to infectious organism, unspecified part of lung [J18.9]    Active Hospital Problems (** Indicates Principal Problem)    Diagnosis Date Noted   • **Multifocal pneumonia [J18.9] 2018   • Chest pain [R07.9] 2018   • Multifocal pneumonia [J18.9] 2018   • Leukocytosis [D72.829] 2018   • Anxiety [F41.9] 2018   • Type 2 diabetes mellitus [E11.9] 2018   • CAD (coronary artery disease) [I25.10] 2018   • Valvular heart disease [I38] 2018   • History of CVA (cerebrovascular accident) [Z86.73] 2018   • Hypothyroidism [E03.9] 2018   • Normocytic anemia [D64.9] 2017      Resolved Hospital Problems    Diagnosis Date Noted Date Resolved   No resolved problems to display.          Hospital Course:  Erma West is a 89 y.o. female presented to ED with chest pain and found to have multifocal PNA. Troponins were negative with no ischemic changes on EKG. It was likely due to multifocal PNA seen on CT Chest (mild symptoms of cough, no supplemental O2) that pt was found to have. For her HTN, her BP was controlled on Coreg and lisinopril. Pt's daughter requested a non-ACEi/ARB BP medication due to pt's  angioedema with ACEi and the fear of medication mix-up. She was discharged on coreg and nifedipine.        Day of Discharge     HPI:   Doing well. No complaints. Eating well. No dyspnea on RA.     Review of Systems  Gen- No fevers or chills  CV- No chest pain or palpitations  Resp- No cough or dyspnea  GI-  No N/V/D or abd pain    Otherwise ROS is negative except as mentioned in the HPI.    Vital Signs:   Temp:  [98.3 °F (36.8 °C)-98.7 °F (37.1 °C)] 98.3 °F (36.8 °C)  Heart Rate:  [60-98] 60  Resp:  [17-20] 17  BP: (141-173)/(56-79) 151/59     Physical Exam:  Constitutional: No acute distress, awake, alert on RA  Eyes: PERRLA, sclerae anicteric, no conjunctival injection  HENT: NCAT, mucous membranes moist  Neck: Supple, no thyromegaly, no lymphadenopathy, trachea midline  Respiratory: Mild bronchial BS bilaterally posteriorly, nonlabored respirations   Cardiovascular: RRR, no murmurs, rubs, or gallops, palpable pedal pulses bilaterally  Gastrointestinal: Positive bowel sounds, soft, nontender, nondistended  Musculoskeletal: No bilateral ankle edema, no clubbing or cyanosis to extremities  Psychiatric: Appropriate affect, cooperative  Neurologic: Oriented x 3, strength symmetric in all extremities, Cranial Nerves grossly intact to confrontation, speech clear  Skin: No rashes    Pertinent  and/or Most Recent Results         Results from last 7 days  Lab Units 04/01/18 0833 03/31/18 0553 03/30/18 2005   WBC 10*3/mm3  --  8.38 14.08*   HEMOGLOBIN g/dL  --  8.3* 9.7*   HEMATOCRIT %  --  26.6* 29.9*   PLATELETS 10*3/mm3  --  120* 177   SODIUM mmol/L 135 138 139   POTASSIUM mmol/L 4.3 4.3 4.6   CHLORIDE mmol/L 108 107 108   CO2 mmol/L 23.0 25.0 24.0   BUN mg/dL 25* 26* 25*   CREATININE mg/dL 1.20 1.20 1.20   GLUCOSE mg/dL 215* 142* 179*   CALCIUM mg/dL 8.6* 8.8 9.4       Results from last 7 days  Lab Units 03/31/18 0553 03/30/18 2005   BILIRUBIN mg/dL 0.5 0.9   ALK PHOS U/L 71 82   ALT (SGPT) U/L 9 14   AST (SGOT) U/L 9 15   PROTIME Seconds  --  11.7*   INR   --  1.11*       Results from last 7 days  Lab Units 03/31/18  0553   CHOLESTEROL mg/dL 132   TRIGLYCERIDES mg/dL 98   HDL CHOL mg/dL 37*       Results from last 7 days  Lab Units 03/31/18  0553   HEMOGLOBIN A1C % 5.60   TROPONIN I ng/mL 0.015     Brief Urine Lab  Results  (Last result in the past 365 days)      Color   Clarity   Blood   Leuk Est   Nitrite   Protein   CREAT   Urine HCG        03/30/18 2104 Yellow Clear Moderate (2+)(A) Negative Negative 100 mg/dL (2+)(A)               Microbiology Results Abnormal     Procedure Component Value - Date/Time    Blood Culture - Blood, [[id number]]  (Normal) Collected:  03/30/18 2251    Lab Status:  Preliminary result Specimen:  Blood from Hand, Left Updated:  04/01/18 0146     Blood Culture No growth at 24 hours    Blood Culture - Blood, [[id number]]  (Normal) Collected:  03/30/18 2251    Lab Status:  Preliminary result Specimen:  Blood from Arm, Right Updated:  04/01/18 0146     Blood Culture No growth at 24 hours          Imaging Results (all)     Procedure Component Value Units Date/Time    CT Chest Without Contrast [[id number]] Collected:  03/31/18 0044     Updated:  03/31/18 0202    Narrative:       EXAM:  CT Chest Without Intravenous Contrast    EXAM DATE/TIME:  3/31/2018 12:44 AM    CLINICAL HISTORY:  89 years old, female; Pneumonia, unspecified organism; Chest   pain, unspecified; Abnormal electrocardiogram ecg ekg; Hypertensive urgency;   Signs and symptoms; Dyspnea; Prior surgery; Additional info: Dyspnea, cardiac   origin suspected    TECHNIQUE:  Axial computed tomography images of the chest without intravenous   contrast.  All CT scans at this facility use one or more dose reduction   techniques, viz.: automated exposure control; ma/kV adjustment per patient size   (including targeted exams where dose is matched to indication; i.e. head); or   iterative reconstruction technique.  Coronal reformatted images were created   and reviewed.    COMPARISON:  CR - XR CHEST 1 VW 2018-03-30 19:38    FINDINGS:    Lungs:  Patchy multifocal right upper and lower lobe pneumonia.  Left hilar   and left upper and lower lobe calcified granulomas.  Mild bibasilar atelectasis.    Pleural space:  Mild posterior pleural effusions.  No  pneumothorax.    Heart:  Cardiomegaly.  Sternotomy wires and mediastinal clips are seen from   prior CABG.  Tricuspid and mitral valve prostheses.    Mediastinum:  Moderate hiatal hernia.    Bones/joints:  Thoracic spondylosis and degenerative bony changes.    Soft tissues:  No significant soft tissue abnormalities.    Vasculature:  Atherosclerotic tortuosity and calcification of the thoracic   aorta.  No aortic aneurysm.  Anatomic variant aberrant right subclavian artery.    Lymph nodes:  No enlarged lymph nodes.    Liver:  Punctate hepatic calcified granulomas.    Spleen:  Punctate splenic calcified granulomas.      Impression:       1.  Patchy multifocal right upper and lower lobe pneumonia.  2.  Mild posterior pleural effusions and bibasilar atelectasis.  3.  Cardiomegaly.  4.  Atherosclerotic vascular disease with prior CABG and mitral and tricuspid   valve repair.  5.  Moderate hiatal hernia.    THIS DOCUMENT HAS BEEN ELECTRONICALLY SIGNED BY CHRISTAL LOPEZ JR. MD    XR Chest 1 View [[id number]] Collected:  03/30/18 1952     Updated:  03/30/18 2053    Narrative:       EXAM:    XR Chest, 1 View    CLINICAL HISTORY:    89 years old, female; Pain; Chest pain; Type not specified; Additional info:   Chest pain protocol    TECHNIQUE:    Frontal view of the chest.    COMPARISON:    CR - XR CHEST PA AND LATERAL 2016-10-10 13:22    FINDINGS:    Lungs:  New multifocal right lung, and to lesser degree the left lung,   airspace opacities.  Calcified left upper lobe granuloma.    Pleural space:  Increased blunting of the right costophrenic angle.  No   pneumothorax.    Heart: Cardiac valve repair.  No cardiomegaly.    Mediastinum:  Unremarkable.    Bones/joints:  Sternal sutures.      Impression:       1.  New multifocal right lung, and to lesser degree the left lung, airspace   opacities, consider pneumonia versus neoplastic process.  If pneumonia is   suspected, recommend followup chest x-ray in 3 months to ensure resolution    (noting that radiographic resolution of pneumonia lags behind clinical   improvement).  2.  Small-to-moderate pleural effusion, increased from prior exam.    THIS DOCUMENT HAS BEEN ELECTRONICALLY SIGNED BY LIZZIE ROSS MD                Order Current Status    Legionella Antigen, Urine - Urine, Urine, Clean Catch In process    S. Pneumo Ag Urine or CSF - Urine, Urine, Clean Catch In process    Blood Culture - Blood, Preliminary result    Blood Culture - Blood, Preliminary result        Discharge Details      Erma West   Home Medication Instructions SEPIDEH:[id number]    Printed on:04/01/18 2203   Medication Information                      carvedilol (COREG) 6.25 MG tablet               dicyclomine (BENTYL) 10 MG capsule               doxycycline (MONODOX) 100 MG capsule  Take 1 capsule by mouth Every 12 (Twelve) Hours for 6 doses.             furosemide (LASIX) 40 MG tablet  40 mg 2 (Two) Times a Week. Monday & Thursday             glimepiride (AMARYL) 1 MG tablet  Take 1 mg by mouth Every Morning Before Breakfast.             hydrocortisone (ANUSOL-HC) 25 MG suppository  Insert 1 suppository into the rectum 2 (Two) Times a Day.             levothyroxine (SYNTHROID, LEVOTHROID) 50 MCG tablet  Take 50 mcg by mouth Daily.             levothyroxine (SYNTHROID, LEVOTHROID) 75 MCG tablet               lithium carbonate 150 MG capsule  Take 150 mg by mouth Daily.             metFORMIN (GLUCOPHAGE) 500 MG tablet  Take 500 mg by mouth Daily.             NIFEdipine XL (ADALAT CC) 30 MG 24 hr tablet  Take 1 tablet by mouth Daily.             nitroglycerin (NITROSTAT) 0.4 MG SL tablet  Place 0.4 mg under the tongue Every 5 (Five) Minutes As Needed for chest pain. Take no more than 3 doses in 15 minutes.             pantoprazole (PROTONIX) 40 MG EC tablet  Take 40 mg by mouth Daily.             potassium chloride (MICRO-K) 10 MEQ CR capsule  Take 10 mEq by mouth 2 (Two) Times a Week. Monday & Thursday              QUEtiapine (SEROquel) 100 MG tablet               RaNITidine HCl (ZANTAC PO)  Take  by mouth.                   Discharge Disposition:  Home or Self Care    Discharge Diet:  Diet Instructions     Please follow a healthy heart diet.                 Discharge Activity:  Activity Instructions     Please have assistance when you get up.                 Special Instructions:  Daily recording of BP  Discussion with PCP regarding continuation on metformin and her CKD    No future appointments.    Additional Instructions for the Follow-ups that You Need to Schedule     Discharge Follow-up with PCP    As directed      Follow Up Details:  1-2 weeks         Additional information on Labs and Follow-ups:      Please call your PCP for a follow-up appointment within two weeks                    Time Spent on Discharge:  >35min    Jenni Vega MD  04/01/18  10:03 PM

## 2018-06-12 NOTE — PROGRESS NOTES
"      PROBLEM LIST:  1. Normocytic anemia  2. Hypothyroidism  3. Diabetes with neuropathy  4. Osteoarthritis  5. CAD  6. CKD  7. Depression/ BPAD  8. Seizure disorder     Subjective     HISTORY OF PRESENT ILLNESS:   Erma West returns for evaluation.  She has had progressive fatigue and spends most of the day in bed.  She complains of left-sided abdominal pain.  Her daughter says that she sleeps almost all day, and may get up before lunch or dinner.  She has lost almost 10 pounds in the past month.  Her appetite is decreased.  She has not had any blood in her urine or stool.  She has been seen by her primary care doctor Dr. Martínez. Labs showed a slight worsening of her anemia with a hemoglobin of 9.3.  She has continued to take an iron pill every other day.    Past Medical History, Past Surgical History, Social History, Family History have been reviewed and are without significant changes except as mentioned.    Review of Systems   A comprehensive 14 point review of systems was performed and was negative except as mentioned.    Medications:  The current medication list was reviewed in the EMR    ALLERGIES:    Allergies   Allergen Reactions   • Penicillins Allergic reaction to contrast dye   • Percodan [Oxycodone-Aspirin]      Headaces       Objective      BP (!) 193/79   Pulse 70   Temp 98.2 °F (36.8 °C) (Temporal Artery )   Resp 16   Ht 152.4 cm (60\")   Wt 51.6 kg (113 lb 12.8 oz)   SpO2 93%   BMI 22.23 kg/m²      Performance Status: 2    General: thin elderly female in no acute distress  Neuro: alert and oriented  HEENT: sclera anicteric, oropharynx clear  Lymphatics: no cervical, supraclavicular, or axillary adenopathy  Cardiovascular: regular rate and rhythm, no murmurs  Lungs: clear to auscultation bilaterally  Abdomen: soft, nondistended.  Extremeties: no lower extremity edema  Skin: no rashes, lesions, bruising, or petechiae  Psych: mood and affect appropriate            Assessment/Plan "   Erma West is a 89 y.o. year old female who presents with persistent anemia, weight loss, and abdominal pain.  Her hemoglobin is fairly stable over the past year.  Recent iron levels do still show evidence of iron deficiency despite oral supplementation.  I am concerned that there is more going on than just iron deficiency is an explanation for her fatigue.  Given her weight loss and abdominal pain, I offered a CT of the abdomen for further evaluation.  She agreed to do this next week.  When she comes for her scan I will also repeat some labs and recheck iron levels.  Given that her iron is low while on oral supplementation, I think intravenous iron would be an option.  I will contact her daughter by phone once the lab and scan results are available.                    Visit time was 25 minutes, greater than 50% spent in counseling      Kate Dodson MD  [facility]    6/12/2018          CC:

## 2018-06-21 NOTE — TELEPHONE ENCOUNTER
Called nida re: labs and CT scan.  CT has no specific findings and no explanation for her weight loss and abdominal pain.  Discussed that a colon cancer may not be visible on CT scan, but she had a normal colonoscopy about 2 years ago.  Labs show hgb 10.5 which is about her baseline, and iron levels are normal.  I have no specific recommendations for her at this point.  I don't think additional iron would be beneficial based on these labs.  They will f/u with Dr. Andino.  I will be happy to see her any time if/when needed.

## 2018-06-21 NOTE — TELEPHONE ENCOUNTER
----- Message from Vilma Dorantes sent at 6/21/2018 12:12 PM EDT -----  Regarding: FELIZ - TEST RESULTS   Contact: [phone]  LAKIA GARCIA, DAUGHTER CALLED AGAIN ASKING IF THE LAB AND SCAN RESULTS ARE BACK YET. SHE CALLED YESTERDAY AND SAID NO ONE CALLED HER BACK.

## 2018-06-21 NOTE — TELEPHONE ENCOUNTER
Reviewed labs with daughter. Let her know her moms iron levels were all in normal range. Hgb and hct were a little bit on the low side, but she would probably not need IV iron. Her blood glucose was elevated, and also kidney function was abnormal. Other than that labs looked ok. Dr. Dodson will make the official call on her iron, and she will be in touch before the end of the week with CT results.

## 2018-10-24 NOTE — PROGRESS NOTES
[facility] at [facility]  Office Progress Note  Erma West  1929  [phone]    VISIT DATE:  10/24/2018     PCP: Rodrigo Martínez MD  [address] [address]    IDENTIFICATION: A 89 y.o. female from [place].    CC:  Chief Complaint   Patient presents with   • Coronary Artery Disease   • Shortness of Breath       PROBLEM LIST:  1. Coronary artery disease   A.  CABG, Shayy, data deficient.   B. [facility] , Cypher stent to left main per Kelvin.  2. VHD   A. S/p mitral and tricuspid repair 2007.  echo: EF >65%, mean gradient MV is 7mmHg. Mild-Moderate TR. S/p mitral and tricuspid annular ring insertions. RVSP 40mmHg.   3. Bipolar disorder  4. Hypertension  5. DMT2  6. Dyslipidemia  7. Hypothyroidism  8. GERD  9. Anxiety/depression  10. H/o GI bleed   A. : bleeding duodenal ulcer per GI scope with cauterization  11. Atrial fibrillation - incidental finding in office today    Past Surgical History:   Procedure Laterality Date   • APPENDECTOMY     • ARTERIAL BYPASS SURGERY     • BACK SURGERY     • CARDIAC CATHETERIZATION  2007   • CATARACT EXTRACTION Bilateral    •  SECTION      x4    • CHOLECYSTECTOMY     • COLONOSCOPY  2016   • CORONARY ANGIOPLASTY WITH STENT PLACEMENT     • HEMORRHOIDECTOMY      x2   • HYSTERECTOMY     • RESECTION RIBS EXTRAPLEURAL        Allergies  Allergies   Allergen Reactions   • Penicillins Hives   • Percodan [Oxycodone-Aspirin]      Headaces       Current Medications    Current Outpatient Prescriptions:   •  carvedilol (COREG) 12.5 MG tablet, Take 12.5 mg by mouth 2 (Two) Times a Day With Meals., Disp: , Rfl:   •  dicyclomine (BENTYL) 10 MG capsule, Take 10 mg by mouth 2 (Two) Times a Day As Needed., Disp: , Rfl:   •  furosemide (LASIX) 40 MG tablet, Take 40 mg by mouth Daily As Needed., Disp: , Rfl:   •  glimepiride (AMARYL) 1 MG tablet, Take 1 mg by mouth Every Morning Before Breakfast., Disp: , Rfl:   •   hydrocortisone (ANUSOL-HC) 25 MG suppository, Insert 1 suppository into the rectum 2 (Two) Times a Day. (Patient taking differently: Insert 25 mg into the rectum As Needed.), Disp: 24 suppository, Rfl: 1  •  levothyroxine (SYNTHROID, LEVOTHROID) 88 MCG tablet, Take 88 mcg by mouth Daily., Disp: , Rfl:   •  lithium carbonate 150 MG capsule, Take 150 mg by mouth Daily., Disp: , Rfl:   •  metFORMIN (GLUCOPHAGE) 500 MG tablet, Take 500 mg by mouth Daily., Disp: , Rfl:   •  nitroglycerin (NITROSTAT) 0.4 MG SL tablet, Place 0.4 mg under the tongue Every 5 (Five) Minutes As Needed for chest pain. Take no more than 3 doses in 15 minutes., Disp: , Rfl:   •  O2 (OXYGEN), Inhale 2 L/min As Needed., Disp: , Rfl:   •  pantoprazole (PROTONIX) 40 MG EC tablet, Take 40 mg by mouth Daily., Disp: , Rfl:   •  potassium chloride (MICRO-K) 10 MEQ CR capsule, Take 10 mEq by mouth As Needed., Disp: , Rfl:   •  QUEtiapine (SEROquel) 100 MG tablet, Take 100 mg by mouth Every Night., Disp: , Rfl:      History of Present Illness   HPI  Patient presents for overdue f/u. Pt denies any chest pain, dyspnea, dyspnea on exertion, orthopnea, PND, palpitations, lower extremity edema. States she doesn't have the same energy that she used to. Does a small amount of walking. Was hospitalized earlier this year for PNA.   in poor health as well at 89.     ROS  ROSAll other systems are reviewed and negative except what is detailed in the HPI      SOCIAL HX  Social History     Social History   • Marital status:      Spouse name: N/A   • Number of children: N/A   • Years of education: N/A     Occupational History   • Not on file.     Social History Main Topics   • Smoking status: Never Smoker   • Smokeless tobacco: Never Used   • Alcohol use No   • Drug use: No   • Sexual activity: Defer     Other Topics Concern   • Not on file     Social History Narrative   • No narrative on file       FAMILY HX  Family History   Problem Relation Age of Onset  "  • Diabetes Mother    • Heart disease Mother    • Diabetes Father    • Diabetes Sister    • Heart attack Brother    • Heart disease Sister    • Heart disease Sister    • Heart failure Sister        Vitals:    10/24/18 1157   BP: 122/46   BP Location: Left arm   Patient Position: Sitting   Pulse: 60   SpO2: 91%   Weight: 53.5 kg (118 lb)   Height: 152.4 cm (60\")       PHYSICAL EXAMINATION:  Physical Exam   Constitutional: She is oriented to person, place, and time. She appears well-developed and well-nourished. No distress.   HENT:   Head: Normocephalic and atraumatic.   Nose: Nose normal.   Mouth/Throat: Uvula is midline, oropharynx is clear and moist and mucous membranes are normal.   Eyes: Pupils are equal, round, and reactive to light. Conjunctivae and EOM are normal. No scleral icterus.   Neck: Normal range of motion. Neck supple. No hepatojugular reflux and no JVD present. Carotid bruit is not present. No tracheal deviation present. No thyromegaly present.   Cardiovascular: Normal rate, regular rhythm, S1 normal, S2 normal, intact distal pulses and normal pulses.  PMI is not displaced.  Exam reveals no gallop, no distant heart sounds, no friction rub, no midsystolic click and no opening snap.    No murmur heard.  Pulses:       Radial pulses are 2+ on the right side, and 2+ on the left side.        Dorsalis pedis pulses are 2+ on the right side, and 2+ on the left side.        Posterior tibial pulses are 2+ on the right side, and 2+ on the left side.   Pulmonary/Chest: Effort normal and breath sounds normal. She has no wheezes. She has no rhonchi. She has no rales.   Abdominal: Soft. Bowel sounds are normal. She exhibits no mass. There is no tenderness. There is no guarding.   Musculoskeletal: Normal range of motion. She exhibits no edema or tenderness.   Walks with cane   Lymphadenopathy:     She has no cervical adenopathy.   Neurological: She is alert and oriented to person, place, and time.   Skin: Skin is " warm, dry and intact. No rash noted. No cyanosis or erythema. Nails show no clubbing.   Psychiatric: She has a normal mood and affect. Her behavior is normal.   Nursing note and vitals reviewed.      Diagnostic Data:  Procedures  Lab Results   Component Value Date    CHLPL 150 02/05/2016    TRIG 98 03/31/2018    HDL 37 (L) 03/31/2018     Lab Results   Component Value Date    GLUCOSE 169 (H) 06/18/2018    BUN 35 (H) 06/18/2018    CREATININE 1.50 (H) 06/18/2018     06/18/2018    K 4.5 06/18/2018     06/18/2018    CO2 24.0 06/18/2018     Lab Results   Component Value Date    HGBA1C 5.60 03/31/2018     Lab Results   Component Value Date    WBC 9.52 06/18/2018    HGB 10.5 (L) 06/18/2018    HCT 33.8 (L) 06/18/2018     06/18/2018       ASSESSMENT:   Diagnosis Plan   1. Coronary artery disease with angina pectoris, unspecified vessel or lesion type, unspecified whether native or transplanted heart (CMS/[facility])     2. Valvular heart disease       PLAN:  Status post remote revascularization surgery and last stenting in 2010.  No anginal equivalent.     Echocardiogram 2016 with preserved EF. Continue medical management. Recheck echo in next year.     Pt counseled to follow weights and only take diuretic if gain >2 lbs in 24 hours    Follow up in 12 months with echo    Scribed for Jim Durand MD by Jim Durand MD. 10/24/2018  1:38 PM    I, Jim Durand MD, personally performed the services described in this documentation as scribed by the above named individual in my presence, and it is both accurate and complete.  10/24/2018  1:38 PM    Jim Durand MD, [facility]

## 2019-01-01 ENCOUNTER — APPOINTMENT (OUTPATIENT)
Dept: MRI IMAGING | Facility: HOSPITAL | Age: 84
End: 2019-01-01

## 2019-01-01 ENCOUNTER — APPOINTMENT (OUTPATIENT)
Dept: GENERAL RADIOLOGY | Facility: HOSPITAL | Age: 84
End: 2019-01-01

## 2019-01-01 ENCOUNTER — APPOINTMENT (OUTPATIENT)
Dept: CARDIOLOGY | Facility: HOSPITAL | Age: 84
End: 2019-01-01
Attending: INTERNAL MEDICINE

## 2019-01-01 ENCOUNTER — APPOINTMENT (OUTPATIENT)
Dept: ULTRASOUND IMAGING | Facility: HOSPITAL | Age: 84
End: 2019-01-01

## 2019-01-01 ENCOUNTER — READMISSION MANAGEMENT (OUTPATIENT)
Dept: CALL CENTER | Facility: HOSPITAL | Age: 84
End: 2019-01-01

## 2019-01-01 ENCOUNTER — LAB REQUISITION (OUTPATIENT)
Dept: LAB | Facility: HOSPITAL | Age: 84
End: 2019-01-01

## 2019-01-01 ENCOUNTER — HOSPITAL ENCOUNTER (INPATIENT)
Facility: HOSPITAL | Age: 84
LOS: 4 days | End: 2019-02-14
Attending: EMERGENCY MEDICINE | Admitting: FAMILY MEDICINE

## 2019-01-01 ENCOUNTER — APPOINTMENT (OUTPATIENT)
Dept: CT IMAGING | Facility: HOSPITAL | Age: 84
End: 2019-01-01

## 2019-01-01 ENCOUNTER — APPOINTMENT (OUTPATIENT)
Dept: CARDIOLOGY | Facility: HOSPITAL | Age: 84
End: 2019-01-01

## 2019-01-01 ENCOUNTER — HOSPITAL ENCOUNTER (INPATIENT)
Facility: HOSPITAL | Age: 84
LOS: 8 days | Discharge: NURSING, REHAB OR LONG-TERM CARE | End: 2019-01-26
Attending: EMERGENCY MEDICINE | Admitting: INTERNAL MEDICINE

## 2019-01-01 VITALS
WEIGHT: 110.2 LBS | DIASTOLIC BLOOD PRESSURE: 67 MMHG | BODY MASS INDEX: 19.53 KG/M2 | SYSTOLIC BLOOD PRESSURE: 172 MMHG | HEART RATE: 62 BPM | OXYGEN SATURATION: 100 % | TEMPERATURE: 97.2 F | HEIGHT: 63 IN | RESPIRATION RATE: 16 BRPM

## 2019-01-01 VITALS
DIASTOLIC BLOOD PRESSURE: 65 MMHG | TEMPERATURE: 98 F | WEIGHT: 116.6 LBS | BODY MASS INDEX: 20.66 KG/M2 | HEIGHT: 63 IN | SYSTOLIC BLOOD PRESSURE: 112 MMHG | OXYGEN SATURATION: 74 %

## 2019-01-01 DIAGNOSIS — I50.9 ACUTE ON CHRONIC CONGESTIVE HEART FAILURE, UNSPECIFIED HEART FAILURE TYPE (HCC): ICD-10-CM

## 2019-01-01 DIAGNOSIS — J96.21 ACUTE ON CHRONIC RESPIRATORY FAILURE WITH HYPOXIA (HCC): ICD-10-CM

## 2019-01-01 DIAGNOSIS — Z00.00 ROUTINE GENERAL MEDICAL EXAMINATION AT A HEALTH CARE FACILITY: ICD-10-CM

## 2019-01-01 DIAGNOSIS — Z74.09 IMPAIRED FUNCTIONAL MOBILITY, BALANCE, GAIT, AND ENDURANCE: ICD-10-CM

## 2019-01-01 DIAGNOSIS — E11.649 TYPE 2 DIABETES MELLITUS WITH HYPOGLYCEMIA WITHOUT COMA, UNSPECIFIED WHETHER LONG TERM INSULIN USE (HCC): ICD-10-CM

## 2019-01-01 DIAGNOSIS — R55 SYNCOPE AND COLLAPSE: ICD-10-CM

## 2019-01-01 DIAGNOSIS — R47.1 DYSARTHRIA: ICD-10-CM

## 2019-01-01 DIAGNOSIS — R53.1 GENERALIZED WEAKNESS: ICD-10-CM

## 2019-01-01 DIAGNOSIS — Z74.09 IMPAIRED MOBILITY AND ADLS: ICD-10-CM

## 2019-01-01 DIAGNOSIS — N18.30 CKD (CHRONIC KIDNEY DISEASE) STAGE 3, GFR 30-59 ML/MIN (HCC): ICD-10-CM

## 2019-01-01 DIAGNOSIS — R41.82 ALTERED MENTAL STATUS, UNSPECIFIED ALTERED MENTAL STATUS TYPE: Primary | ICD-10-CM

## 2019-01-01 DIAGNOSIS — J18.9 PNEUMONIA DUE TO INFECTIOUS ORGANISM, UNSPECIFIED LATERALITY, UNSPECIFIED PART OF LUNG: Primary | ICD-10-CM

## 2019-01-01 DIAGNOSIS — J10.1 INFLUENZA A: ICD-10-CM

## 2019-01-01 DIAGNOSIS — F41.9 ANXIETY: ICD-10-CM

## 2019-01-01 DIAGNOSIS — Z78.9 IMPAIRED MOBILITY AND ADLS: ICD-10-CM

## 2019-01-01 LAB
ALBUMIN SERPL-MCNC: 3.82 G/DL (ref 3.2–4.8)
ALBUMIN SERPL-MCNC: 3.93 G/DL (ref 3.2–4.8)
ALBUMIN SERPL-MCNC: 4.36 G/DL (ref 3.2–4.8)
ALBUMIN SERPL-MCNC: 4.55 G/DL (ref 3.2–4.8)
ALBUMIN/GLOB SERPL: 1.7 G/DL (ref 1.5–2.5)
ALBUMIN/GLOB SERPL: 1.7 G/DL (ref 1.5–2.5)
ALBUMIN/GLOB SERPL: 1.8 G/DL (ref 1.5–2.5)
ALP SERPL-CCNC: 63 U/L (ref 25–100)
ALP SERPL-CCNC: 70 U/L (ref 25–100)
ALP SERPL-CCNC: 90 U/L (ref 25–100)
ALT SERPL W P-5'-P-CCNC: 12 U/L (ref 7–40)
ALT SERPL W P-5'-P-CCNC: 8 U/L (ref 7–40)
ALT SERPL W P-5'-P-CCNC: 9 U/L (ref 7–40)
ANION GAP SERPL CALCULATED.3IONS-SCNC: 14.9 MMOL/L
ANION GAP SERPL CALCULATED.3IONS-SCNC: 2 MMOL/L (ref 3–11)
ANION GAP SERPL CALCULATED.3IONS-SCNC: 4 MMOL/L (ref 3–11)
ANION GAP SERPL CALCULATED.3IONS-SCNC: 5 MMOL/L (ref 3–11)
ANION GAP SERPL CALCULATED.3IONS-SCNC: 6 MMOL/L (ref 3–11)
ANION GAP SERPL CALCULATED.3IONS-SCNC: 7 MMOL/L (ref 3–11)
ANION GAP SERPL CALCULATED.3IONS-SCNC: 8 MMOL/L (ref 3–11)
ARTERIAL PATENCY WRIST A: ABNORMAL
ARTICHOKE IGE QN: 67 MG/DL (ref 0–130)
AST SERPL-CCNC: 15 U/L (ref 0–33)
AST SERPL-CCNC: 18 U/L (ref 0–33)
AST SERPL-CCNC: 8 U/L (ref 0–33)
ATMOSPHERIC PRESS: ABNORMAL MMHG
BACTERIA FLD CULT: NORMAL
BACTERIA SPEC AEROBE CULT: NORMAL
BACTERIA SPEC AEROBE CULT: NORMAL
BASE EXCESS BLDA CALC-SCNC: -7.3 MMOL/L (ref 0–2)
BASOPHILS # BLD AUTO: 0.02 10*3/MM3 (ref 0–0.2)
BASOPHILS # BLD AUTO: 0.03 10*3/MM3 (ref 0–0.2)
BASOPHILS # BLD AUTO: 0.04 10*3/MM3 (ref 0–0.2)
BASOPHILS # BLD AUTO: 0.12 10*3/MM3 (ref 0–0.2)
BASOPHILS NFR BLD AUTO: 0.3 % (ref 0–1)
BASOPHILS NFR BLD AUTO: 0.4 % (ref 0–1.5)
BASOPHILS NFR BLD AUTO: 0.5 % (ref 0–1)
BDY SITE: ABNORMAL
BH CV ECHO MEAS - AI DEC SLOPE: 156.4 CM/SEC^2
BH CV ECHO MEAS - AI MAX PG: 35.7 MMHG
BH CV ECHO MEAS - AI MAX VEL: 298.6 CM/SEC
BH CV ECHO MEAS - AI P1/2T: 559.3 MSEC
BH CV ECHO MEAS - AO MAX PG (FULL): 6.4 MMHG
BH CV ECHO MEAS - AO MAX PG (FULL): 8.4 MMHG
BH CV ECHO MEAS - AO MAX PG: 14.4 MMHG
BH CV ECHO MEAS - AO MAX PG: 8 MMHG
BH CV ECHO MEAS - AO MEAN PG (FULL): 2.9 MMHG
BH CV ECHO MEAS - AO MEAN PG (FULL): 3 MMHG
BH CV ECHO MEAS - AO MEAN PG: 3.9 MMHG
BH CV ECHO MEAS - AO MEAN PG: 6.7 MMHG
BH CV ECHO MEAS - AO ROOT AREA (BSA CORRECTED): 1.7
BH CV ECHO MEAS - AO ROOT AREA (BSA CORRECTED): 1.8
BH CV ECHO MEAS - AO ROOT AREA: 5.6 CM^2
BH CV ECHO MEAS - AO ROOT AREA: 5.9 CM^2
BH CV ECHO MEAS - AO ROOT DIAM: 2.7 CM
BH CV ECHO MEAS - AO ROOT DIAM: 2.7 CM
BH CV ECHO MEAS - AO V2 MAX: 141.2 CM/SEC
BH CV ECHO MEAS - AO V2 MAX: 190 CM/SEC
BH CV ECHO MEAS - AO V2 MEAN: 117.3 CM/SEC
BH CV ECHO MEAS - AO V2 MEAN: 91.6 CM/SEC
BH CV ECHO MEAS - AO V2 VTI: 35.6 CM
BH CV ECHO MEAS - AO V2 VTI: 52.4 CM
BH CV ECHO MEAS - ASC AORTA: 2.3 CM
BH CV ECHO MEAS - AVA(I,A): 1.3 CM^2
BH CV ECHO MEAS - AVA(I,A): 2 CM^2
BH CV ECHO MEAS - AVA(I,D): 1.3 CM^2
BH CV ECHO MEAS - AVA(I,D): 2 CM^2
BH CV ECHO MEAS - AVA(V,A): 1.2 CM^2
BH CV ECHO MEAS - AVA(V,A): 2.1 CM^2
BH CV ECHO MEAS - AVA(V,D): 1.2 CM^2
BH CV ECHO MEAS - AVA(V,D): 2.1 CM^2
BH CV ECHO MEAS - BSA(HAYCOCK): 1.5 M^2
BH CV ECHO MEAS - BSA: 1.5 M^2
BH CV ECHO MEAS - BZI_BMI: 20.5 KILOGRAMS/M^2
BH CV ECHO MEAS - BZI_METRIC_HEIGHT: 160 CM
BH CV ECHO MEAS - BZI_METRIC_WEIGHT: 52.6 KG
BH CV ECHO MEAS - EDV(CUBED): 46.7 ML
BH CV ECHO MEAS - EDV(CUBED): 48.4 ML
BH CV ECHO MEAS - EDV(MOD-SP2): 62 ML
BH CV ECHO MEAS - EDV(MOD-SP4): 49 ML
BH CV ECHO MEAS - EDV(MOD-SP4): 59 ML
BH CV ECHO MEAS - EDV(TEICH): 54.5 ML
BH CV ECHO MEAS - EDV(TEICH): 56.1 ML
BH CV ECHO MEAS - EF(CUBED): 44.8 %
BH CV ECHO MEAS - EF(CUBED): 51.3 %
BH CV ECHO MEAS - EF(MOD-SP2): 46.8 %
BH CV ECHO MEAS - EF(MOD-SP4): 34.7 %
BH CV ECHO MEAS - EF(MOD-SP4): 57.6 %
BH CV ECHO MEAS - EF(TEICH): 38.1 %
BH CV ECHO MEAS - EF(TEICH): 44.1 %
BH CV ECHO MEAS - ESV(CUBED): 23.6 ML
BH CV ECHO MEAS - ESV(CUBED): 25.8 ML
BH CV ECHO MEAS - ESV(MOD-SP2): 33 ML
BH CV ECHO MEAS - ESV(MOD-SP4): 25 ML
BH CV ECHO MEAS - ESV(MOD-SP4): 32 ML
BH CV ECHO MEAS - ESV(TEICH): 31.4 ML
BH CV ECHO MEAS - ESV(TEICH): 33.7 ML
BH CV ECHO MEAS - FS: 18 %
BH CV ECHO MEAS - FS: 21.3 %
BH CV ECHO MEAS - IVS/LVPW: 1
BH CV ECHO MEAS - IVS/LVPW: 1.4
BH CV ECHO MEAS - IVSD: 1 CM
BH CV ECHO MEAS - IVSD: 1.2 CM
BH CV ECHO MEAS - LA DIMENSION: 3.7 CM
BH CV ECHO MEAS - LA DIMENSION: 3.8 CM
BH CV ECHO MEAS - LA/AO: 1.3
BH CV ECHO MEAS - LA/AO: 1.4
BH CV ECHO MEAS - LAD MAJOR: 5.8 CM
BH CV ECHO MEAS - LAT PEAK E' VEL: 6.3 CM/SEC
BH CV ECHO MEAS - LAT PEAK E' VEL: 7.5 CM/SEC
BH CV ECHO MEAS - LATERAL E/E' RATIO: 26.3
BH CV ECHO MEAS - LATERAL E/E' RATIO: 28
BH CV ECHO MEAS - LV DIASTOLIC VOL/BSA (35-75): 31.9 ML/M^2
BH CV ECHO MEAS - LV DIASTOLIC VOL/BSA (35-75): 38.5 ML/M^2
BH CV ECHO MEAS - LV MASS(C)D: 107.8 GRAMS
BH CV ECHO MEAS - LV MASS(C)D: 90.7 GRAMS
BH CV ECHO MEAS - LV MASS(C)DI: 59.1 GRAMS/M^2
BH CV ECHO MEAS - LV MASS(C)DI: 70.2 GRAMS/M^2
BH CV ECHO MEAS - LV MAX PG: 1.6 MMHG
BH CV ECHO MEAS - LV MAX PG: 6 MMHG
BH CV ECHO MEAS - LV MEAN PG: 0.93 MMHG
BH CV ECHO MEAS - LV MEAN PG: 3.7 MMHG
BH CV ECHO MEAS - LV SYSTOLIC VOL/BSA (12-30): 16.3 ML/M^2
BH CV ECHO MEAS - LV SYSTOLIC VOL/BSA (12-30): 20.9 ML/M^2
BH CV ECHO MEAS - LV V1 MAX: 122.7 CM/SEC
BH CV ECHO MEAS - LV V1 MAX: 63.4 CM/SEC
BH CV ECHO MEAS - LV V1 MEAN: 45.5 CM/SEC
BH CV ECHO MEAS - LV V1 MEAN: 89.8 CM/SEC
BH CV ECHO MEAS - LV V1 VTI: 17.1 CM
BH CV ECHO MEAS - LV V1 VTI: 32.3 CM
BH CV ECHO MEAS - LVIDD: 3.6 CM
BH CV ECHO MEAS - LVIDD: 3.6 CM
BH CV ECHO MEAS - LVIDS: 2.9 CM
BH CV ECHO MEAS - LVIDS: 3 CM
BH CV ECHO MEAS - LVLD AP2: 7.5 CM
BH CV ECHO MEAS - LVLD AP4: 7.7 CM
BH CV ECHO MEAS - LVLD AP4: 7.9 CM
BH CV ECHO MEAS - LVLS AP2: 6.7 CM
BH CV ECHO MEAS - LVLS AP4: 7 CM
BH CV ECHO MEAS - LVLS AP4: 7.1 CM
BH CV ECHO MEAS - LVOT AREA (M): 2.8 CM^2
BH CV ECHO MEAS - LVOT AREA (M): 3.1 CM^2
BH CV ECHO MEAS - LVOT AREA: 2.7 CM^2
BH CV ECHO MEAS - LVOT AREA: 3.3 CM^2
BH CV ECHO MEAS - LVOT DIAM: 1.9 CM
BH CV ECHO MEAS - LVOT DIAM: 2 CM
BH CV ECHO MEAS - LVPWD: 0.82 CM
BH CV ECHO MEAS - LVPWD: 1 CM
BH CV ECHO MEAS - MED PEAK E' VEL: 4 CM/SEC
BH CV ECHO MEAS - MED PEAK E' VEL: 6.1 CM/SEC
BH CV ECHO MEAS - MEDIAL E/E' RATIO: 31.8
BH CV ECHO MEAS - MEDIAL E/E' RATIO: 44.1
BH CV ECHO MEAS - MR MAX PG: 93 MMHG
BH CV ECHO MEAS - MR MAX VEL: 481.2 CM/SEC
BH CV ECHO MEAS - MR MEAN PG: 43.9 MMHG
BH CV ECHO MEAS - MR MEAN VEL: 288.6 CM/SEC
BH CV ECHO MEAS - MR VTI: 101.6 CM
BH CV ECHO MEAS - MV A MAX VEL: 82.1 CM/SEC
BH CV ECHO MEAS - MV A MAX VEL: 99.5 CM/SEC
BH CV ECHO MEAS - MV DEC SLOPE: 386.6 CM/SEC^2
BH CV ECHO MEAS - MV DEC SLOPE: 780 CM/SEC^2
BH CV ECHO MEAS - MV DEC TIME: 0.26 SEC
BH CV ECHO MEAS - MV DEC TIME: 0.35 SEC
BH CV ECHO MEAS - MV E MAX VEL: 178.9 CM/SEC
BH CV ECHO MEAS - MV E MAX VEL: 199.5 CM/SEC
BH CV ECHO MEAS - MV E/A: 2
BH CV ECHO MEAS - MV E/A: 2.2
BH CV ECHO MEAS - MV MAX PG: 15.6 MMHG
BH CV ECHO MEAS - MV MAX PG: 18.5 MMHG
BH CV ECHO MEAS - MV MEAN PG: 4.5 MMHG
BH CV ECHO MEAS - MV MEAN PG: 4.6 MMHG
BH CV ECHO MEAS - MV P1/2T MAX VEL: 186.2 CM/SEC
BH CV ECHO MEAS - MV P1/2T MAX VEL: 202.9 CM/SEC
BH CV ECHO MEAS - MV P1/2T: 141 MSEC
BH CV ECHO MEAS - MV P1/2T: 76.2 MSEC
BH CV ECHO MEAS - MV V2 MAX: 197.4 CM/SEC
BH CV ECHO MEAS - MV V2 MAX: 215.1 CM/SEC
BH CV ECHO MEAS - MV V2 MEAN: 92.2 CM/SEC
BH CV ECHO MEAS - MV V2 MEAN: 93.5 CM/SEC
BH CV ECHO MEAS - MV V2 VTI: 60.5 CM
BH CV ECHO MEAS - MV V2 VTI: 72.1 CM
BH CV ECHO MEAS - MVA P1/2T LCG: 1.1 CM^2
BH CV ECHO MEAS - MVA P1/2T LCG: 1.2 CM^2
BH CV ECHO MEAS - MVA(P1/2T): 1.6 CM^2
BH CV ECHO MEAS - MVA(P1/2T): 2.9 CM^2
BH CV ECHO MEAS - MVA(VTI): 0.77 CM^2
BH CV ECHO MEAS - MVA(VTI): 1.5 CM^2
BH CV ECHO MEAS - PA ACC SLOPE: 369.9 CM/SEC^2
BH CV ECHO MEAS - PA ACC SLOPE: 847.3 CM/SEC^2
BH CV ECHO MEAS - PA ACC TIME: 0.09 SEC
BH CV ECHO MEAS - PA ACC TIME: 0.12 SEC
BH CV ECHO MEAS - PA MAX PG: 4.5 MMHG
BH CV ECHO MEAS - PA PR(ACCEL): 26.7 MMHG
BH CV ECHO MEAS - PA PR(ACCEL): 40.2 MMHG
BH CV ECHO MEAS - PA V2 MAX: 106.4 CM/SEC
BH CV ECHO MEAS - PI END-D VEL: 146 CM/SEC
BH CV ECHO MEAS - PULM DIAS VEL: 62.6 CM/SEC
BH CV ECHO MEAS - PULM S/D: 0.38
BH CV ECHO MEAS - PULM SYS VEL: 23.8 CM/SEC
BH CV ECHO MEAS - RAP SYSTOLE: 15 MMHG
BH CV ECHO MEAS - RAP SYSTOLE: 8 MMHG
BH CV ECHO MEAS - RVDD: 2.3 CM
BH CV ECHO MEAS - RVDD: 3.6 CM
BH CV ECHO MEAS - RVSP: 66 MMHG
BH CV ECHO MEAS - RVSP: 69 MMHG
BH CV ECHO MEAS - SI(AO): 136.6 ML/M^2
BH CV ECHO MEAS - SI(AO): 191.1 ML/M^2
BH CV ECHO MEAS - SI(CUBED): 13.6 ML/M^2
BH CV ECHO MEAS - SI(CUBED): 16.2 ML/M^2
BH CV ECHO MEAS - SI(LVOT): 30.2 ML/M^2
BH CV ECHO MEAS - SI(LVOT): 68.7 ML/M^2
BH CV ECHO MEAS - SI(MOD-SP2): 18.9 ML/M^2
BH CV ECHO MEAS - SI(MOD-SP4): 11.1 ML/M^2
BH CV ECHO MEAS - SI(MOD-SP4): 22.2 ML/M^2
BH CV ECHO MEAS - SI(TEICH): 13.5 ML/M^2
BH CV ECHO MEAS - SI(TEICH): 16.1 ML/M^2
BH CV ECHO MEAS - SV(AO): 209.6 ML
BH CV ECHO MEAS - SV(AO): 293.1 ML
BH CV ECHO MEAS - SV(CUBED): 20.9 ML
BH CV ECHO MEAS - SV(CUBED): 24.8 ML
BH CV ECHO MEAS - SV(LVOT): 105.4 ML
BH CV ECHO MEAS - SV(LVOT): 46.3 ML
BH CV ECHO MEAS - SV(MOD-SP2): 29 ML
BH CV ECHO MEAS - SV(MOD-SP4): 17 ML
BH CV ECHO MEAS - SV(MOD-SP4): 34 ML
BH CV ECHO MEAS - SV(TEICH): 20.8 ML
BH CV ECHO MEAS - SV(TEICH): 24.7 ML
BH CV ECHO MEAS - TAPSE (>1.6): 0.7 CM2
BH CV ECHO MEAS - TR MAX PG: 54 MMHG
BH CV ECHO MEAS - TR MAX PG: 58 MMHG
BH CV ECHO MEAS - TR MAX VEL: 368 CM/SEC
BH CV ECHO MEAS - TR MAX VEL: 380 CM/SEC
BH CV ECHO MEAS - TV MAX PG: 3.6 MMHG
BH CV ECHO MEAS - TV V2 MAX: 95.3 CM/SEC
BH CV ECHO MEASUREMENTS AVERAGE E/E' RATIO: 29.34
BH CV ECHO MEASUREMENTS AVERAGE E/E' RATIO: 34.74
BH CV XLRA - RV BASE: 4.4 CM
BH CV XLRA - RV BASE: 5.4 CM
BH CV XLRA - RV LENGTH: 7 CM
BH CV XLRA - RV LENGTH: 8.5 CM
BH CV XLRA - RV MID: 4.3 CM
BH CV XLRA - RV MID: 4.6 CM
BH CV XLRA - TDI S': 5.72 CM/SEC
BH CV XLRA MEAS LEFT CCA RATIO VEL: 66.3 CM/SEC
BH CV XLRA MEAS LEFT DIST CCA EDV: 17.5 CM/SEC
BH CV XLRA MEAS LEFT DIST CCA PSV: 67 CM/SEC
BH CV XLRA MEAS LEFT DIST ICA EDV: 30.7 CM/SEC
BH CV XLRA MEAS LEFT DIST ICA PSV: 78.9 CM/SEC
BH CV XLRA MEAS LEFT ICA RATIO VEL: 92.2 CM/SEC
BH CV XLRA MEAS LEFT ICA/CCA RATIO: 1.4
BH CV XLRA MEAS LEFT MID CCA EDV: 21 CM/SEC
BH CV XLRA MEAS LEFT MID CCA PSV: 58 CM/SEC
BH CV XLRA MEAS LEFT MID ICA EDV: 29.3 CM/SEC
BH CV XLRA MEAS LEFT MID ICA PSV: 92.9 CM/SEC
BH CV XLRA MEAS LEFT PROX CCA EDV: 16.1 CM/SEC
BH CV XLRA MEAS LEFT PROX CCA PSV: 100.6 CM/SEC
BH CV XLRA MEAS LEFT PROX ECA PSV: 73.3 CM/SEC
BH CV XLRA MEAS LEFT PROX ICA EDV: 18.9 CM/SEC
BH CV XLRA MEAS LEFT PROX ICA PSV: 62.2 CM/SEC
BH CV XLRA MEAS LEFT PROX SCLA PSV: 140.6 CM/SEC
BH CV XLRA MEAS LEFT VERTEBRAL A EDV: 19.6 CM/SEC
BH CV XLRA MEAS LEFT VERTEBRAL A PSV: 60.1 CM/SEC
BH CV XLRA MEAS RIGHT CCA RATIO VEL: 84.5 CM/SEC
BH CV XLRA MEAS RIGHT DIST CCA EDV: 11.9 CM/SEC
BH CV XLRA MEAS RIGHT DIST CCA PSV: 54.5 CM/SEC
BH CV XLRA MEAS RIGHT DIST ICA EDV: 37 CM/SEC
BH CV XLRA MEAS RIGHT DIST ICA PSV: 149.3 CM/SEC
BH CV XLRA MEAS RIGHT ICA RATIO VEL: 154 CM/SEC
BH CV XLRA MEAS RIGHT ICA/CCA RATIO: 1.8
BH CV XLRA MEAS RIGHT MID CCA EDV: 9.8 CM/SEC
BH CV XLRA MEAS RIGHT MID CCA PSV: 85.2 CM/SEC
BH CV XLRA MEAS RIGHT MID ICA EDV: 41.5 CM/SEC
BH CV XLRA MEAS RIGHT MID ICA PSV: 149.3 CM/SEC
BH CV XLRA MEAS RIGHT PROX CCA EDV: 13.3 CM/SEC
BH CV XLRA MEAS RIGHT PROX CCA PSV: 93.6 CM/SEC
BH CV XLRA MEAS RIGHT PROX ECA PSV: 84.9 CM/SEC
BH CV XLRA MEAS RIGHT PROX ICA EDV: 20.4 CM/SEC
BH CV XLRA MEAS RIGHT PROX ICA PSV: 75 CM/SEC
BH CV XLRA MEAS RIGHT PROX SCLA PSV: 186.1 CM/SEC
BH CV XLRA MEAS RIGHT VERTEBRAL A EDV: 21 CM/SEC
BH CV XLRA MEAS RIGHT VERTEBRAL A PSV: 55.1 CM/SEC
BILIRUB SERPL-MCNC: 0.5 MG/DL (ref 0.3–1.2)
BILIRUB SERPL-MCNC: 0.6 MG/DL (ref 0.3–1.2)
BILIRUB SERPL-MCNC: 0.8 MG/DL (ref 0.3–1.2)
BILIRUB UR QL STRIP: NEGATIVE
BILIRUB UR QL STRIP: NEGATIVE
BNP SERPL-MCNC: 1029 PG/ML (ref 0–100)
BNP SERPL-MCNC: 1602 PG/ML (ref 0–100)
BNP SERPL-MCNC: 2363 PG/ML (ref 0–100)
BODY TEMPERATURE: 37 C
BUN BLD-MCNC: 36 MG/DL (ref 9–23)
BUN BLD-MCNC: 37 MG/DL (ref 9–23)
BUN BLD-MCNC: 38 MG/DL (ref 9–23)
BUN BLD-MCNC: 42 MG/DL (ref 9–23)
BUN BLD-MCNC: 43 MG/DL (ref 9–23)
BUN BLD-MCNC: 43 MG/DL (ref 9–23)
BUN BLD-MCNC: 46 MG/DL (ref 9–23)
BUN BLD-MCNC: 51 MG/DL (ref 9–23)
BUN BLD-MCNC: 51 MG/DL (ref 9–23)
BUN BLD-MCNC: 53 MG/DL (ref 9–23)
BUN BLD-MCNC: 59 MG/DL (ref 8–23)
BUN BLD-MCNC: 62 MG/DL (ref 9–23)
BUN BLD-MCNC: 67 MG/DL (ref 9–23)
BUN BLD-MCNC: 69 MG/DL (ref 9–23)
BUN BLD-MCNC: 73 MG/DL (ref 9–23)
BUN/CREAT SERPL: 22.9 (ref 7–25)
BUN/CREAT SERPL: 25.8 (ref 7–25)
BUN/CREAT SERPL: 26.1 (ref 7–25)
BUN/CREAT SERPL: 27.4 (ref 7–25)
BUN/CREAT SERPL: 27.5 (ref 7–25)
BUN/CREAT SERPL: 27.7 (ref 7–25)
BUN/CREAT SERPL: 29.9 (ref 7–25)
BUN/CREAT SERPL: 30.2 (ref 7–25)
BUN/CREAT SERPL: 32.9 (ref 7–25)
BUN/CREAT SERPL: 33.3 (ref 7–25)
BUN/CREAT SERPL: 34.2 (ref 7–25)
BUN/CREAT SERPL: 34.4 (ref 7–25)
BUN/CREAT SERPL: 35.8 (ref 7–25)
BUN/CREAT SERPL: 36.2 (ref 7–25)
BUN/CREAT SERPL: 38.7 (ref 7–25)
CALCIUM SPEC-SCNC: 8.3 MG/DL (ref 8.7–10.4)
CALCIUM SPEC-SCNC: 8.3 MG/DL (ref 8.7–10.4)
CALCIUM SPEC-SCNC: 8.4 MG/DL (ref 8.7–10.4)
CALCIUM SPEC-SCNC: 8.5 MG/DL (ref 8.7–10.4)
CALCIUM SPEC-SCNC: 8.5 MG/DL (ref 8.7–10.4)
CALCIUM SPEC-SCNC: 8.7 MG/DL (ref 8.6–10.5)
CALCIUM SPEC-SCNC: 8.7 MG/DL (ref 8.7–10.4)
CALCIUM SPEC-SCNC: 8.8 MG/DL (ref 8.7–10.4)
CALCIUM SPEC-SCNC: 8.8 MG/DL (ref 8.7–10.4)
CALCIUM SPEC-SCNC: 9 MG/DL (ref 8.7–10.4)
CALCIUM SPEC-SCNC: 9 MG/DL (ref 8.7–10.4)
CALCIUM SPEC-SCNC: 9.3 MG/DL (ref 8.7–10.4)
CHLORIDE SERPL-SCNC: 100 MMOL/L (ref 99–109)
CHLORIDE SERPL-SCNC: 101 MMOL/L (ref 99–109)
CHLORIDE SERPL-SCNC: 102 MMOL/L (ref 99–109)
CHLORIDE SERPL-SCNC: 103 MMOL/L (ref 99–109)
CHLORIDE SERPL-SCNC: 104 MMOL/L (ref 99–109)
CHLORIDE SERPL-SCNC: 104 MMOL/L (ref 99–109)
CHLORIDE SERPL-SCNC: 105 MMOL/L (ref 99–109)
CHLORIDE SERPL-SCNC: 105 MMOL/L (ref 99–109)
CHLORIDE SERPL-SCNC: 107 MMOL/L (ref 99–109)
CHLORIDE SERPL-SCNC: 108 MMOL/L (ref 99–109)
CHLORIDE SERPL-SCNC: 90 MMOL/L (ref 98–107)
CHLORIDE SERPL-SCNC: 98 MMOL/L (ref 99–109)
CHLORIDE SERPL-SCNC: 99 MMOL/L (ref 99–109)
CHOLEST SERPL-MCNC: 118 MG/DL (ref 0–200)
CLARITY UR: CLEAR
CLARITY UR: CLEAR
CO2 BLDA-SCNC: 27.4 MMOL/L (ref 23–27)
CO2 SERPL-SCNC: 24 MMOL/L (ref 20–31)
CO2 SERPL-SCNC: 26 MMOL/L (ref 20–31)
CO2 SERPL-SCNC: 28 MMOL/L (ref 20–31)
CO2 SERPL-SCNC: 28 MMOL/L (ref 20–31)
CO2 SERPL-SCNC: 29 MMOL/L (ref 20–31)
CO2 SERPL-SCNC: 30.1 MMOL/L (ref 22–29)
CO2 SERPL-SCNC: 31 MMOL/L (ref 20–31)
CO2 SERPL-SCNC: 32 MMOL/L (ref 20–31)
CO2 SERPL-SCNC: 34 MMOL/L (ref 20–31)
CO2 SERPL-SCNC: 34 MMOL/L (ref 20–31)
COHGB MFR BLD: 1.3 % (ref 0–2)
COLOR UR: YELLOW
COLOR UR: YELLOW
CREAT BLD-MCNC: 1.05 MG/DL (ref 0.6–1.3)
CREAT BLD-MCNC: 1.11 MG/DL (ref 0.6–1.3)
CREAT BLD-MCNC: 1.22 MG/DL (ref 0.6–1.3)
CREAT BLD-MCNC: 1.3 MG/DL (ref 0.6–1.3)
CREAT BLD-MCNC: 1.42 MG/DL (ref 0.6–1.3)
CREAT BLD-MCNC: 1.49 MG/DL (ref 0.6–1.3)
CREAT BLD-MCNC: 1.55 MG/DL (ref 0.6–1.3)
CREAT BLD-MCNC: 1.78 MG/DL (ref 0.6–1.3)
CREAT BLD-MCNC: 1.88 MG/DL (ref 0.6–1.3)
CREAT BLD-MCNC: 1.93 MG/DL (ref 0.6–1.3)
CREAT BLD-MCNC: 2.01 MG/DL (ref 0.6–1.3)
CREAT BLD-MCNC: 2.04 MG/DL (ref 0.6–1.3)
CREAT BLD-MCNC: 2.05 MG/DL (ref 0.6–1.3)
CREAT BLD-MCNC: 2.15 MG/DL (ref 0.57–1)
CREAT BLD-MCNC: 2.31 MG/DL (ref 0.6–1.3)
CREAT UR-MCNC: 47.1 MG/DL
D-LACTATE SERPL-SCNC: 0.8 MMOL/L (ref 0.5–2)
DEPRECATED RDW RBC AUTO: 52.5 FL (ref 37–54)
DEPRECATED RDW RBC AUTO: 52.5 FL (ref 37–54)
DEPRECATED RDW RBC AUTO: 52.8 FL (ref 37–54)
DEPRECATED RDW RBC AUTO: 53.7 FL (ref 37–54)
DEPRECATED RDW RBC AUTO: 54.9 FL (ref 37–54)
DEPRECATED RDW RBC AUTO: 57 FL (ref 37–54)
DEPRECATED RDW RBC AUTO: 57.7 FL (ref 37–54)
DEPRECATED RDW RBC AUTO: 58.2 FL (ref 37–54)
DEPRECATED RDW RBC AUTO: 59 FL (ref 37–54)
DEPRECATED RDW RBC AUTO: 60.7 FL (ref 37–54)
EOSINOPHIL # BLD AUTO: 0.01 10*3/MM3 (ref 0–0.3)
EOSINOPHIL # BLD AUTO: 0.07 10*3/MM3 (ref 0–0.3)
EOSINOPHIL # BLD AUTO: 0.1 10*3/MM3 (ref 0–0.3)
EOSINOPHIL # BLD AUTO: 0.13 10*3/MM3 (ref 0–0.7)
EOSINOPHIL # BLD AUTO: 0.16 10*3/MM3 (ref 0–0.3)
EOSINOPHIL # BLD AUTO: 0.36 10*3/MM3 (ref 0–0.3)
EOSINOPHIL NFR BLD AUTO: 0.2 % (ref 0–3)
EOSINOPHIL NFR BLD AUTO: 0.7 % (ref 0–3)
EOSINOPHIL NFR BLD AUTO: 1.4 % (ref 0.3–6.2)
EOSINOPHIL NFR BLD AUTO: 1.5 % (ref 0–3)
EOSINOPHIL NFR BLD AUTO: 1.5 % (ref 0–3)
EOSINOPHIL NFR BLD AUTO: 2.2 % (ref 0–3)
EOSINOPHIL SPEC QL MICRO: 0 % EOS/100 CELLS (ref 0–0)
EPAP: 0
ERYTHROCYTE [DISTWIDTH] IN BLOOD BY AUTOMATED COUNT: 14.6 % (ref 11.3–14.5)
ERYTHROCYTE [DISTWIDTH] IN BLOOD BY AUTOMATED COUNT: 14.7 % (ref 11.3–14.5)
ERYTHROCYTE [DISTWIDTH] IN BLOOD BY AUTOMATED COUNT: 14.7 % (ref 11.3–14.5)
ERYTHROCYTE [DISTWIDTH] IN BLOOD BY AUTOMATED COUNT: 14.7 % (ref 11.7–13)
ERYTHROCYTE [DISTWIDTH] IN BLOOD BY AUTOMATED COUNT: 14.8 % (ref 11.3–14.5)
ERYTHROCYTE [DISTWIDTH] IN BLOOD BY AUTOMATED COUNT: 15.6 % (ref 11.3–14.5)
ERYTHROCYTE [DISTWIDTH] IN BLOOD BY AUTOMATED COUNT: 15.9 % (ref 11.3–14.5)
ERYTHROCYTE [DISTWIDTH] IN BLOOD BY AUTOMATED COUNT: 15.9 % (ref 11.3–14.5)
ERYTHROCYTE [DISTWIDTH] IN BLOOD BY AUTOMATED COUNT: 16.1 % (ref 11.3–14.5)
ERYTHROCYTE [DISTWIDTH] IN BLOOD BY AUTOMATED COUNT: 16.2 % (ref 11.3–14.5)
ERYTHROCYTE [SEDIMENTATION RATE] IN BLOOD: 42 MM/HR (ref 0–30)
FERRITIN SERPL-MCNC: 206 NG/ML (ref 10–291)
FLUAV SUBTYP SPEC NAA+PROBE: DETECTED
FLUBV RNA ISLT QL NAA+PROBE: NOT DETECTED
FOLATE SERPL-MCNC: 10.52 NG/ML (ref 3.2–20)
FOLATE SERPL-MCNC: 3.16 NG/ML (ref 3.2–20)
GFR SERPL CREATININE-BSD FRML MDRD: 20 ML/MIN/1.73
GFR SERPL CREATININE-BSD FRML MDRD: 22 ML/MIN/1.73
GFR SERPL CREATININE-BSD FRML MDRD: 23 ML/MIN/1.73
GFR SERPL CREATININE-BSD FRML MDRD: 24 ML/MIN/1.73
GFR SERPL CREATININE-BSD FRML MDRD: 25 ML/MIN/1.73
GFR SERPL CREATININE-BSD FRML MDRD: 27 ML/MIN/1.73
GFR SERPL CREATININE-BSD FRML MDRD: 31 ML/MIN/1.73
GFR SERPL CREATININE-BSD FRML MDRD: 33 ML/MIN/1.73
GFR SERPL CREATININE-BSD FRML MDRD: 35 ML/MIN/1.73
GFR SERPL CREATININE-BSD FRML MDRD: 39 ML/MIN/1.73
GFR SERPL CREATININE-BSD FRML MDRD: 42 ML/MIN/1.73
GFR SERPL CREATININE-BSD FRML MDRD: 46 ML/MIN/1.73
GFR SERPL CREATININE-BSD FRML MDRD: 49 ML/MIN/1.73
GLOBULIN UR ELPH-MCNC: 2.4 GM/DL
GLOBULIN UR ELPH-MCNC: 2.5 GM/DL
GLOBULIN UR ELPH-MCNC: 2.6 GM/DL
GLUCOSE BLD-MCNC: 117 MG/DL (ref 70–100)
GLUCOSE BLD-MCNC: 119 MG/DL (ref 70–100)
GLUCOSE BLD-MCNC: 120 MG/DL (ref 70–100)
GLUCOSE BLD-MCNC: 130 MG/DL (ref 70–100)
GLUCOSE BLD-MCNC: 142 MG/DL (ref 70–100)
GLUCOSE BLD-MCNC: 148 MG/DL (ref 70–100)
GLUCOSE BLD-MCNC: 150 MG/DL (ref 70–100)
GLUCOSE BLD-MCNC: 159 MG/DL (ref 70–100)
GLUCOSE BLD-MCNC: 164 MG/DL (ref 70–100)
GLUCOSE BLD-MCNC: 171 MG/DL (ref 65–99)
GLUCOSE BLD-MCNC: 180 MG/DL (ref 70–100)
GLUCOSE BLD-MCNC: 36 MG/DL (ref 70–100)
GLUCOSE BLD-MCNC: 394 MG/DL (ref 70–100)
GLUCOSE BLD-MCNC: 67 MG/DL (ref 70–100)
GLUCOSE BLD-MCNC: 78 MG/DL (ref 70–100)
GLUCOSE BLDC GLUCOMTR-MCNC: 109 MG/DL (ref 70–130)
GLUCOSE BLDC GLUCOMTR-MCNC: 112 MG/DL (ref 70–130)
GLUCOSE BLDC GLUCOMTR-MCNC: 119 MG/DL (ref 70–130)
GLUCOSE BLDC GLUCOMTR-MCNC: 122 MG/DL (ref 70–130)
GLUCOSE BLDC GLUCOMTR-MCNC: 122 MG/DL (ref 70–130)
GLUCOSE BLDC GLUCOMTR-MCNC: 123 MG/DL (ref 70–130)
GLUCOSE BLDC GLUCOMTR-MCNC: 124 MG/DL (ref 70–130)
GLUCOSE BLDC GLUCOMTR-MCNC: 126 MG/DL (ref 70–130)
GLUCOSE BLDC GLUCOMTR-MCNC: 127 MG/DL (ref 70–130)
GLUCOSE BLDC GLUCOMTR-MCNC: 127 MG/DL (ref 70–130)
GLUCOSE BLDC GLUCOMTR-MCNC: 131 MG/DL (ref 70–130)
GLUCOSE BLDC GLUCOMTR-MCNC: 136 MG/DL (ref 70–130)
GLUCOSE BLDC GLUCOMTR-MCNC: 138 MG/DL (ref 70–130)
GLUCOSE BLDC GLUCOMTR-MCNC: 138 MG/DL (ref 70–130)
GLUCOSE BLDC GLUCOMTR-MCNC: 143 MG/DL (ref 70–130)
GLUCOSE BLDC GLUCOMTR-MCNC: 146 MG/DL (ref 70–130)
GLUCOSE BLDC GLUCOMTR-MCNC: 148 MG/DL (ref 70–130)
GLUCOSE BLDC GLUCOMTR-MCNC: 154 MG/DL (ref 70–130)
GLUCOSE BLDC GLUCOMTR-MCNC: 156 MG/DL (ref 70–130)
GLUCOSE BLDC GLUCOMTR-MCNC: 160 MG/DL (ref 70–130)
GLUCOSE BLDC GLUCOMTR-MCNC: 160 MG/DL (ref 70–130)
GLUCOSE BLDC GLUCOMTR-MCNC: 167 MG/DL (ref 70–130)
GLUCOSE BLDC GLUCOMTR-MCNC: 171 MG/DL (ref 70–130)
GLUCOSE BLDC GLUCOMTR-MCNC: 172 MG/DL (ref 70–130)
GLUCOSE BLDC GLUCOMTR-MCNC: 173 MG/DL (ref 70–130)
GLUCOSE BLDC GLUCOMTR-MCNC: 178 MG/DL (ref 70–130)
GLUCOSE BLDC GLUCOMTR-MCNC: 181 MG/DL (ref 70–130)
GLUCOSE BLDC GLUCOMTR-MCNC: 183 MG/DL (ref 70–130)
GLUCOSE BLDC GLUCOMTR-MCNC: 188 MG/DL (ref 70–130)
GLUCOSE BLDC GLUCOMTR-MCNC: 191 MG/DL (ref 70–130)
GLUCOSE BLDC GLUCOMTR-MCNC: 200 MG/DL (ref 70–130)
GLUCOSE BLDC GLUCOMTR-MCNC: 213 MG/DL (ref 70–130)
GLUCOSE BLDC GLUCOMTR-MCNC: 217 MG/DL (ref 70–130)
GLUCOSE BLDC GLUCOMTR-MCNC: 226 MG/DL (ref 70–130)
GLUCOSE BLDC GLUCOMTR-MCNC: 227 MG/DL (ref 70–130)
GLUCOSE BLDC GLUCOMTR-MCNC: 232 MG/DL (ref 70–130)
GLUCOSE BLDC GLUCOMTR-MCNC: 261 MG/DL (ref 70–130)
GLUCOSE BLDC GLUCOMTR-MCNC: 277 MG/DL (ref 70–130)
GLUCOSE BLDC GLUCOMTR-MCNC: 309 MG/DL (ref 70–130)
GLUCOSE BLDC GLUCOMTR-MCNC: 344 MG/DL (ref 70–130)
GLUCOSE BLDC GLUCOMTR-MCNC: 48 MG/DL (ref 70–130)
GLUCOSE BLDC GLUCOMTR-MCNC: 48 MG/DL (ref 70–130)
GLUCOSE BLDC GLUCOMTR-MCNC: 54 MG/DL (ref 70–130)
GLUCOSE BLDC GLUCOMTR-MCNC: 56 MG/DL (ref 70–130)
GLUCOSE BLDC GLUCOMTR-MCNC: 57 MG/DL (ref 70–130)
GLUCOSE BLDC GLUCOMTR-MCNC: 62 MG/DL (ref 70–130)
GLUCOSE BLDC GLUCOMTR-MCNC: 79 MG/DL (ref 70–130)
GLUCOSE BLDC GLUCOMTR-MCNC: 79 MG/DL (ref 70–130)
GLUCOSE BLDC GLUCOMTR-MCNC: 92 MG/DL (ref 70–130)
GLUCOSE UR STRIP-MCNC: NEGATIVE MG/DL
GLUCOSE UR STRIP-MCNC: NEGATIVE MG/DL
HBA1C MFR BLD: 5.7 % (ref 4.8–5.6)
HBA1C MFR BLD: 6.3 % (ref 4.8–5.6)
HCO3 BLDA-SCNC: 24.5 MMOL/L (ref 20–26)
HCT VFR BLD AUTO: 24.8 % (ref 34.5–44)
HCT VFR BLD AUTO: 26 % (ref 35.6–45.5)
HCT VFR BLD AUTO: 26.2 % (ref 34.5–44)
HCT VFR BLD AUTO: 27.2 % (ref 34.5–44)
HCT VFR BLD AUTO: 27.6 % (ref 34.5–44)
HCT VFR BLD AUTO: 27.7 % (ref 34.5–44)
HCT VFR BLD AUTO: 28.4 % (ref 34.5–44)
HCT VFR BLD AUTO: 28.6 % (ref 34.5–44)
HCT VFR BLD AUTO: 28.8 % (ref 34.5–44)
HCT VFR BLD AUTO: 30.9 % (ref 34.5–44)
HCT VFR BLD AUTO: 31.7 % (ref 34.5–44)
HCT VFR BLD CALC: 29.7 %
HDLC SERPL-MCNC: 41 MG/DL (ref 40–60)
HEMOCCULT STL QL: POSITIVE
HGB BLD-MCNC: 7.6 G/DL (ref 11.5–15.5)
HGB BLD-MCNC: 8 G/DL (ref 11.5–15.5)
HGB BLD-MCNC: 8 G/DL (ref 11.9–15.5)
HGB BLD-MCNC: 8.2 G/DL (ref 11.5–15.5)
HGB BLD-MCNC: 8.3 G/DL (ref 11.5–15.5)
HGB BLD-MCNC: 8.4 G/DL (ref 11.5–15.5)
HGB BLD-MCNC: 8.6 G/DL (ref 11.5–15.5)
HGB BLD-MCNC: 8.6 G/DL (ref 11.5–15.5)
HGB BLD-MCNC: 8.7 G/DL (ref 11.5–15.5)
HGB BLD-MCNC: 9.4 G/DL (ref 11.5–15.5)
HGB BLD-MCNC: 9.5 G/DL (ref 11.5–15.5)
HGB BLDA-MCNC: 9.7 G/DL (ref 14–18)
HGB UR QL STRIP.AUTO: NEGATIVE
HGB UR QL STRIP.AUTO: NEGATIVE
HOLD SPECIMEN: NORMAL
HOROWITZ INDEX BLD+IHG-RTO: 100 %
IMM GRANULOCYTES # BLD AUTO: 0.01 10*3/MM3 (ref 0–0.03)
IMM GRANULOCYTES # BLD AUTO: 0.02 10*3/MM3 (ref 0–0.03)
IMM GRANULOCYTES # BLD AUTO: 0.03 10*3/MM3 (ref 0–0.03)
IMM GRANULOCYTES # BLD AUTO: 0.07 10*3/MM3 (ref 0–0.05)
IMM GRANULOCYTES NFR BLD AUTO: 0.1 % (ref 0–0.6)
IMM GRANULOCYTES NFR BLD AUTO: 0.2 % (ref 0–0.5)
IMM GRANULOCYTES NFR BLD AUTO: 0.2 % (ref 0–0.6)
IMM GRANULOCYTES NFR BLD AUTO: 0.2 % (ref 0–0.6)
IMM GRANULOCYTES NFR BLD AUTO: 0.3 % (ref 0–0.6)
IMM GRANULOCYTES NFR BLD AUTO: 0.3 % (ref 0–0.6)
INTERPRETATION UR IFE-IMP: NORMAL
IPAP: 0
IRON 24H UR-MRATE: 15 MCG/DL (ref 50–175)
IRON 24H UR-MRATE: 47 MCG/DL (ref 50–175)
IRON 24H UR-MRATE: 47 MCG/DL (ref 50–175)
IRON SATN MFR SERPL: 20 % (ref 15–50)
IRON SATN MFR SERPL: 7 % (ref 15–50)
KETONES UR QL STRIP: NEGATIVE
KETONES UR QL STRIP: NEGATIVE
L PNEUMO1 AG UR QL IA: NEGATIVE
LEFT ATRIUM VOLUME INDEX: 41.7 ML/M^2
LEFT ATRIUM VOLUME: 64 ML
LEUKOCYTE ESTERASE UR QL STRIP.AUTO: NEGATIVE
LEUKOCYTE ESTERASE UR QL STRIP.AUTO: NEGATIVE
LITHIUM SERPL-SCNC: 1.1 MMOL/L (ref 0.6–1.2)
LITHIUM SERPL-SCNC: 1.2 MMOL/L (ref 0.6–1.2)
LV EF 2D ECHO EST: 55 %
LYMPHOCYTES # BLD AUTO: 0.51 10*3/MM3 (ref 0.6–4.8)
LYMPHOCYTES # BLD AUTO: 0.76 10*3/MM3 (ref 0.6–4.8)
LYMPHOCYTES # BLD AUTO: 0.84 10*3/MM3 (ref 0.6–4.8)
LYMPHOCYTES # BLD AUTO: 1.24 10*3/MM3 (ref 0.6–4.8)
LYMPHOCYTES # BLD AUTO: 1.26 10*3/MM3 (ref 0.9–4.8)
LYMPHOCYTES # BLD AUTO: 5.69 10*3/MM3 (ref 0.6–4.8)
LYMPHOCYTES NFR BLD AUTO: 11.5 % (ref 24–44)
LYMPHOCYTES NFR BLD AUTO: 13.3 % (ref 19.6–45.3)
LYMPHOCYTES NFR BLD AUTO: 17.4 % (ref 24–44)
LYMPHOCYTES NFR BLD AUTO: 23.1 % (ref 24–44)
LYMPHOCYTES NFR BLD AUTO: 8 % (ref 24–44)
LYMPHOCYTES NFR BLD AUTO: 8.5 % (ref 24–44)
Lab: ABNORMAL
Lab: NORMAL
M PNEUMO DNA SPEC QL NAA+PROBE: NEGATIVE
MAGNESIUM SERPL-MCNC: 2 MG/DL (ref 1.3–2.7)
MAGNESIUM SERPL-MCNC: 2.1 MG/DL (ref 1.3–2.7)
MAGNESIUM SERPL-MCNC: 2.1 MG/DL (ref 1.3–2.7)
MAGNESIUM SERPL-MCNC: 2.2 MG/DL (ref 1.3–2.7)
MAGNESIUM SERPL-MCNC: 2.3 MG/DL (ref 1.3–2.7)
MAGNESIUM SERPL-MCNC: 2.6 MG/DL (ref 1.3–2.7)
MAXIMAL PREDICTED HEART RATE: 131 BPM
MCH RBC QN AUTO: 29.9 PG (ref 27–31)
MCH RBC QN AUTO: 30 PG (ref 27–31)
MCH RBC QN AUTO: 30 PG (ref 27–31)
MCH RBC QN AUTO: 30.1 PG (ref 27–31)
MCH RBC QN AUTO: 30.1 PG (ref 27–31)
MCH RBC QN AUTO: 30.2 PG (ref 27–31)
MCH RBC QN AUTO: 30.3 PG (ref 27–31)
MCH RBC QN AUTO: 30.5 PG (ref 27–31)
MCH RBC QN AUTO: 30.6 PG (ref 27–31)
MCH RBC QN AUTO: 30.7 PG (ref 26.9–32)
MCHC RBC AUTO-ENTMCNC: 30 G/DL (ref 32–36)
MCHC RBC AUTO-ENTMCNC: 30 G/DL (ref 32–36)
MCHC RBC AUTO-ENTMCNC: 30.1 G/DL (ref 32–36)
MCHC RBC AUTO-ENTMCNC: 30.3 G/DL (ref 32–36)
MCHC RBC AUTO-ENTMCNC: 30.4 G/DL (ref 32–36)
MCHC RBC AUTO-ENTMCNC: 30.5 G/DL (ref 32–36)
MCHC RBC AUTO-ENTMCNC: 30.6 G/DL (ref 32–36)
MCHC RBC AUTO-ENTMCNC: 30.8 G/DL (ref 32.4–36.3)
MCV RBC AUTO: 100 FL (ref 80–99)
MCV RBC AUTO: 101 FL (ref 80–99)
MCV RBC AUTO: 101.1 FL (ref 80–99)
MCV RBC AUTO: 98.6 FL (ref 80–99)
MCV RBC AUTO: 98.9 FL (ref 80–99)
MCV RBC AUTO: 98.9 FL (ref 80–99)
MCV RBC AUTO: 99 FL (ref 80–99)
MCV RBC AUTO: 99 FL (ref 80–99)
MCV RBC AUTO: 99.6 FL (ref 80.5–98.2)
MCV RBC AUTO: 99.6 FL (ref 80–99)
METHGB BLD QL: 1 % (ref 0–1.5)
MODALITY: ABNORMAL
MONOCYTES # BLD AUTO: 0.17 10*3/MM3 (ref 0–1)
MONOCYTES # BLD AUTO: 0.33 10*3/MM3 (ref 0–1)
MONOCYTES # BLD AUTO: 0.46 10*3/MM3 (ref 0–1)
MONOCYTES # BLD AUTO: 0.79 10*3/MM3 (ref 0–1)
MONOCYTES # BLD AUTO: 0.95 10*3/MM3 (ref 0.2–1.2)
MONOCYTES # BLD AUTO: 1.23 10*3/MM3 (ref 0–1)
MONOCYTES NFR BLD AUTO: 10 % (ref 5–12)
MONOCYTES NFR BLD AUTO: 2.8 % (ref 0–12)
MONOCYTES NFR BLD AUTO: 5 % (ref 0–12)
MONOCYTES NFR BLD AUTO: 5 % (ref 0–12)
MONOCYTES NFR BLD AUTO: 6.4 % (ref 0–12)
MONOCYTES NFR BLD AUTO: 7.5 % (ref 0–12)
NEUTROPHILS # BLD AUTO: 17.24 10*3/MM3 (ref 1.5–8.3)
NEUTROPHILS # BLD AUTO: 5.26 10*3/MM3 (ref 1.5–8.3)
NEUTROPHILS # BLD AUTO: 5.32 10*3/MM3 (ref 1.5–8.3)
NEUTROPHILS # BLD AUTO: 5.4 10*3/MM3 (ref 1.5–8.3)
NEUTROPHILS # BLD AUTO: 7.09 10*3/MM3 (ref 1.9–8.1)
NEUTROPHILS # BLD AUTO: 8.8 10*3/MM3 (ref 1.5–8.3)
NEUTROPHILS NFR BLD AUTO: 69.9 % (ref 41–71)
NEUTROPHILS NFR BLD AUTO: 73.7 % (ref 41–71)
NEUTROPHILS NFR BLD AUTO: 74.7 % (ref 42.7–76)
NEUTROPHILS NFR BLD AUTO: 81.7 % (ref 41–71)
NEUTROPHILS NFR BLD AUTO: 83.5 % (ref 41–71)
NEUTROPHILS NFR BLD AUTO: 88.2 % (ref 41–71)
NITRITE UR QL STRIP: NEGATIVE
NITRITE UR QL STRIP: NEGATIVE
NOTE: ABNORMAL
NOTIFIED BY: ABNORMAL
NOTIFIED WHO: ABNORMAL
ORGANISM ID: NORMAL
OSMOLALITY UR: 277 MOSM/KG (ref 300–1100)
OXYHGB MFR BLDV: 82.6 % (ref 94–99)
PAW @ PEAK INSP FLOW SETTING VENT: 0 CMH2O
PCO2 BLDA: 94.5 MM HG (ref 35–45)
PCO2 TEMP ADJ BLD: 94.5 MM HG (ref 35–45)
PH BLDA: 7.02 PH UNITS (ref 7.35–7.45)
PH UR STRIP.AUTO: <=5 [PH] (ref 5–8)
PH UR STRIP.AUTO: <=5 [PH] (ref 5–8)
PH, TEMP CORRECTED: 7.02 PH UNITS
PHOSPHATE SERPL-MCNC: 3.2 MG/DL (ref 2.4–5.1)
PLAT MORPH BLD: NORMAL
PLATELET # BLD AUTO: 103 10*3/MM3 (ref 150–450)
PLATELET # BLD AUTO: 107 10*3/MM3 (ref 150–450)
PLATELET # BLD AUTO: 116 10*3/MM3 (ref 150–450)
PLATELET # BLD AUTO: 142 10*3/MM3 (ref 150–450)
PLATELET # BLD AUTO: 147 10*3/MM3 (ref 150–450)
PLATELET # BLD AUTO: 150 10*3/MM3 (ref 150–450)
PLATELET # BLD AUTO: 177 10*3/MM3 (ref 150–450)
PLATELET # BLD AUTO: 178 10*3/MM3 (ref 150–450)
PLATELET # BLD AUTO: 179 10*3/MM3 (ref 140–500)
PLATELET # BLD AUTO: 329 10*3/MM3 (ref 150–450)
PMV BLD AUTO: 11.2 FL (ref 6–12)
PMV BLD AUTO: 11.4 FL (ref 6–12)
PMV BLD AUTO: 11.4 FL (ref 6–12)
PMV BLD AUTO: 11.6 FL (ref 6–12)
PMV BLD AUTO: 11.7 FL (ref 6–12)
PMV BLD AUTO: 11.7 FL (ref 6–12)
PMV BLD AUTO: 11.8 FL (ref 6–12)
PO2 BLDA: 68 MM HG (ref 83–108)
PO2 TEMP ADJ BLD: 68 MM HG (ref 83–108)
POTASSIUM BLD-SCNC: 3.6 MMOL/L (ref 3.5–5.5)
POTASSIUM BLD-SCNC: 3.7 MMOL/L (ref 3.5–5.5)
POTASSIUM BLD-SCNC: 3.9 MMOL/L (ref 3.5–5.5)
POTASSIUM BLD-SCNC: 4 MMOL/L (ref 3.5–5.5)
POTASSIUM BLD-SCNC: 4.1 MMOL/L (ref 3.5–5.5)
POTASSIUM BLD-SCNC: 4.2 MMOL/L (ref 3.5–5.5)
POTASSIUM BLD-SCNC: 4.3 MMOL/L (ref 3.5–5.5)
POTASSIUM BLD-SCNC: 4.3 MMOL/L (ref 3.5–5.5)
POTASSIUM BLD-SCNC: 4.5 MMOL/L (ref 3.5–5.2)
POTASSIUM BLD-SCNC: 4.5 MMOL/L (ref 3.5–5.5)
POTASSIUM BLD-SCNC: 4.6 MMOL/L (ref 3.5–5.5)
POTASSIUM BLD-SCNC: 4.7 MMOL/L (ref 3.5–5.5)
POTASSIUM BLD-SCNC: 4.7 MMOL/L (ref 3.5–5.5)
PROCALCITONIN SERPL-MCNC: 0.13 NG/ML
PROT SERPL-MCNC: 6.3 G/DL (ref 5.7–8.2)
PROT SERPL-MCNC: 6.9 G/DL (ref 5.7–8.2)
PROT SERPL-MCNC: 7.1 G/DL (ref 5.7–8.2)
PROT UR QL STRIP: ABNORMAL
PROT UR QL STRIP: NEGATIVE
PROT UR-MCNC: 9 MG/DL (ref 1–14)
RBC # BLD AUTO: 2.48 10*6/MM3 (ref 3.89–5.14)
RBC # BLD AUTO: 2.61 10*6/MM3 (ref 3.9–5.2)
RBC # BLD AUTO: 2.65 10*6/MM3 (ref 3.89–5.14)
RBC # BLD AUTO: 2.69 10*6/MM3 (ref 3.89–5.14)
RBC # BLD AUTO: 2.78 10*6/MM3 (ref 3.89–5.14)
RBC # BLD AUTO: 2.79 10*6/MM3 (ref 3.89–5.14)
RBC # BLD AUTO: 2.87 10*6/MM3 (ref 3.89–5.14)
RBC # BLD AUTO: 2.9 10*6/MM3 (ref 3.89–5.14)
RBC # BLD AUTO: 3.12 10*6/MM3 (ref 3.89–5.14)
RBC # BLD AUTO: 3.14 10*6/MM3 (ref 3.89–5.14)
RBC MORPH BLD: NORMAL
REF LAB TEST RESULTS: NORMAL
RETICS/RBC NFR AUTO: 3.02 % (ref 0.5–1.5)
S PNEUM AG SPEC QL LA: NEGATIVE
SODIUM BLD-SCNC: 131 MMOL/L (ref 132–146)
SODIUM BLD-SCNC: 134 MMOL/L (ref 132–146)
SODIUM BLD-SCNC: 135 MMOL/L (ref 132–146)
SODIUM BLD-SCNC: 135 MMOL/L (ref 136–145)
SODIUM BLD-SCNC: 136 MMOL/L (ref 132–146)
SODIUM BLD-SCNC: 138 MMOL/L (ref 132–146)
SODIUM BLD-SCNC: 139 MMOL/L (ref 132–146)
SODIUM BLD-SCNC: 140 MMOL/L (ref 132–146)
SODIUM BLD-SCNC: 140 MMOL/L (ref 132–146)
SODIUM BLD-SCNC: 141 MMOL/L (ref 132–146)
SODIUM BLD-SCNC: 141 MMOL/L (ref 132–146)
SODIUM UR-SCNC: 17 MMOL/L (ref 30–90)
SP GR UR STRIP: 1.01 (ref 1–1.03)
SP GR UR STRIP: 1.01 (ref 1–1.03)
SPECIMEN SOURCE: NORMAL
STRESS TARGET HR: 111 BPM
TIBC SERPL-MCNC: 226 MCG/DL (ref 250–450)
TIBC SERPL-MCNC: 241 MCG/DL (ref 250–450)
TOTAL RATE: 0 BREATHS/MINUTE
TRIGL SERPL-MCNC: 94 MG/DL (ref 0–150)
TROPONIN I SERPL-MCNC: 0 NG/ML (ref 0–0.07)
TROPONIN I SERPL-MCNC: 0.01 NG/ML (ref 0–0.07)
TROPONIN I SERPL-MCNC: 0.06 NG/ML
TSH SERPL DL<=0.05 MIU/L-ACNC: 1.59 MIU/ML (ref 0.35–5.35)
TSH SERPL DL<=0.05 MIU/L-ACNC: 3.29 MIU/ML (ref 0.35–5.35)
UROBILINOGEN UR QL STRIP: ABNORMAL
UROBILINOGEN UR QL STRIP: NORMAL
VIT B12 BLD-MCNC: 1635 PG/ML (ref 211–911)
VIT B12 BLD-MCNC: 854 PG/ML (ref 211–911)
WBC MORPH BLD: NORMAL
WBC NRBC COR # BLD: 10.53 10*3/MM3 (ref 3.5–10.8)
WBC NRBC COR # BLD: 24.64 10*3/MM3 (ref 3.5–10.8)
WBC NRBC COR # BLD: 3.45 10*3/MM3 (ref 3.5–10.8)
WBC NRBC COR # BLD: 4.47 10*3/MM3 (ref 3.5–10.8)
WBC NRBC COR # BLD: 5.66 10*3/MM3 (ref 3.5–10.8)
WBC NRBC COR # BLD: 6.03 10*3/MM3 (ref 3.5–10.8)
WBC NRBC COR # BLD: 6.38 10*3/MM3 (ref 3.5–10.8)
WBC NRBC COR # BLD: 6.61 10*3/MM3 (ref 3.5–10.8)
WBC NRBC COR # BLD: 7.14 10*3/MM3 (ref 3.5–10.8)
WBC NRBC COR # BLD: 9.49 10*3/MM3 (ref 4.5–10.7)
WHOLE BLOOD HOLD SPECIMEN: NORMAL

## 2019-01-01 PROCEDURE — 97116 GAIT TRAINING THERAPY: CPT | Performed by: PHYSICAL THERAPIST

## 2019-01-01 PROCEDURE — 80048 BASIC METABOLIC PNL TOTAL CA: CPT | Performed by: INTERNAL MEDICINE

## 2019-01-01 PROCEDURE — 73501 X-RAY EXAM HIP UNI 1 VIEW: CPT

## 2019-01-01 PROCEDURE — 82805 BLOOD GASES W/O2 SATURATION: CPT

## 2019-01-01 PROCEDURE — 25010000002 HEPARIN (PORCINE) PER 1000 UNITS: Performed by: INTERNAL MEDICINE

## 2019-01-01 PROCEDURE — 25010000002 HEPARIN (PORCINE) PER 1000 UNITS: Performed by: NURSE PRACTITIONER

## 2019-01-01 PROCEDURE — 99233 SBSQ HOSP IP/OBS HIGH 50: CPT | Performed by: INTERNAL MEDICINE

## 2019-01-01 PROCEDURE — 86335 IMMUNFIX E-PHORSIS/URINE/CSF: CPT | Performed by: NURSE PRACTITIONER

## 2019-01-01 PROCEDURE — 87798 DETECT AGENT NOS DNA AMP: CPT

## 2019-01-01 PROCEDURE — 81003 URINALYSIS AUTO W/O SCOPE: CPT | Performed by: PHYSICIAN ASSISTANT

## 2019-01-01 PROCEDURE — 87449 NOS EACH ORGANISM AG IA: CPT | Performed by: INTERNAL MEDICINE

## 2019-01-01 PROCEDURE — 99223 1ST HOSP IP/OBS HIGH 75: CPT | Performed by: INTERNAL MEDICINE

## 2019-01-01 PROCEDURE — 25010000002 FUROSEMIDE PER 20 MG: Performed by: INTERNAL MEDICINE

## 2019-01-01 PROCEDURE — 80069 RENAL FUNCTION PANEL: CPT | Performed by: NURSE PRACTITIONER

## 2019-01-01 PROCEDURE — 84443 ASSAY THYROID STIM HORMONE: CPT | Performed by: NURSE PRACTITIONER

## 2019-01-01 PROCEDURE — 84300 ASSAY OF URINE SODIUM: CPT | Performed by: NURSE PRACTITIONER

## 2019-01-01 PROCEDURE — 63710000001 PREDNISONE PER 5 MG: Performed by: INTERNAL MEDICINE

## 2019-01-01 PROCEDURE — 93010 ELECTROCARDIOGRAM REPORT: CPT | Performed by: INTERNAL MEDICINE

## 2019-01-01 PROCEDURE — 84156 ASSAY OF PROTEIN URINE: CPT | Performed by: NURSE PRACTITIONER

## 2019-01-01 PROCEDURE — 87205 SMEAR GRAM STAIN: CPT | Performed by: NURSE PRACTITIONER

## 2019-01-01 PROCEDURE — 63710000001 DICYCLOMINE 10 MG CAPSULE: Performed by: NURSE PRACTITIONER

## 2019-01-01 PROCEDURE — 85025 COMPLETE CBC W/AUTO DIFF WBC: CPT | Performed by: EMERGENCY MEDICINE

## 2019-01-01 PROCEDURE — 83036 HEMOGLOBIN GLYCOSYLATED A1C: CPT | Performed by: NURSE PRACTITIONER

## 2019-01-01 PROCEDURE — 94640 AIRWAY INHALATION TREATMENT: CPT

## 2019-01-01 PROCEDURE — 63710000001 INSULIN LISPRO (HUMAN) PER 5 UNITS: Performed by: NURSE PRACTITIONER

## 2019-01-01 PROCEDURE — 76775 US EXAM ABDO BACK WALL LIM: CPT

## 2019-01-01 PROCEDURE — 71045 X-RAY EXAM CHEST 1 VIEW: CPT

## 2019-01-01 PROCEDURE — 83735 ASSAY OF MAGNESIUM: CPT | Performed by: EMERGENCY MEDICINE

## 2019-01-01 PROCEDURE — 80061 LIPID PANEL: CPT | Performed by: NURSE PRACTITIONER

## 2019-01-01 PROCEDURE — 82962 GLUCOSE BLOOD TEST: CPT

## 2019-01-01 PROCEDURE — 93880 EXTRACRANIAL BILAT STUDY: CPT

## 2019-01-01 PROCEDURE — 82746 ASSAY OF FOLIC ACID SERUM: CPT | Performed by: INTERNAL MEDICINE

## 2019-01-01 PROCEDURE — 83880 ASSAY OF NATRIURETIC PEPTIDE: CPT | Performed by: EMERGENCY MEDICINE

## 2019-01-01 PROCEDURE — 25010000002 NA FERRIC GLUC CPLX PER 12.5 MG: Performed by: INTERNAL MEDICINE

## 2019-01-01 PROCEDURE — 97535 SELF CARE MNGMENT TRAINING: CPT

## 2019-01-01 PROCEDURE — 97110 THERAPEUTIC EXERCISES: CPT | Performed by: PHYSICAL THERAPIST

## 2019-01-01 PROCEDURE — 87633 RESP VIRUS 12-25 TARGETS: CPT

## 2019-01-01 PROCEDURE — 84145 PROCALCITONIN (PCT): CPT | Performed by: INTERNAL MEDICINE

## 2019-01-01 PROCEDURE — 82746 ASSAY OF FOLIC ACID SERUM: CPT | Performed by: NURSE PRACTITIONER

## 2019-01-01 PROCEDURE — 94760 N-INVAS EAR/PLS OXIMETRY 1: CPT

## 2019-01-01 PROCEDURE — 70450 CT HEAD/BRAIN W/O DYE: CPT

## 2019-01-01 PROCEDURE — 25010000002 CEFTRIAXONE PER 250 MG: Performed by: INTERNAL MEDICINE

## 2019-01-01 PROCEDURE — 97161 PT EVAL LOW COMPLEX 20 MIN: CPT

## 2019-01-01 PROCEDURE — 83880 ASSAY OF NATRIURETIC PEPTIDE: CPT | Performed by: INTERNAL MEDICINE

## 2019-01-01 PROCEDURE — 85018 HEMOGLOBIN: CPT | Performed by: INTERNAL MEDICINE

## 2019-01-01 PROCEDURE — 25010000002 METHYLPREDNISOLONE PER 40 MG: Performed by: INTERNAL MEDICINE

## 2019-01-01 PROCEDURE — 94799 UNLISTED PULMONARY SVC/PX: CPT

## 2019-01-01 PROCEDURE — 93880 EXTRACRANIAL BILAT STUDY: CPT | Performed by: INTERNAL MEDICINE

## 2019-01-01 PROCEDURE — 94660 CPAP INITIATION&MGMT: CPT

## 2019-01-01 PROCEDURE — 93005 ELECTROCARDIOGRAM TRACING: CPT | Performed by: EMERGENCY MEDICINE

## 2019-01-01 PROCEDURE — 63710000001 INSULIN LISPRO (HUMAN) PER 5 UNITS: Performed by: INTERNAL MEDICINE

## 2019-01-01 PROCEDURE — 85025 COMPLETE CBC W/AUTO DIFF WBC: CPT | Performed by: INTERNAL MEDICINE

## 2019-01-01 PROCEDURE — G0378 HOSPITAL OBSERVATION PER HR: HCPCS

## 2019-01-01 PROCEDURE — 87502 INFLUENZA DNA AMP PROBE: CPT | Performed by: EMERGENCY MEDICINE

## 2019-01-01 PROCEDURE — A9270 NON-COVERED ITEM OR SERVICE: HCPCS | Performed by: NURSE PRACTITIONER

## 2019-01-01 PROCEDURE — 85025 COMPLETE CBC W/AUTO DIFF WBC: CPT | Performed by: NURSE PRACTITIONER

## 2019-01-01 PROCEDURE — 99232 SBSQ HOSP IP/OBS MODERATE 35: CPT | Performed by: INTERNAL MEDICINE

## 2019-01-01 PROCEDURE — 93306 TTE W/DOPPLER COMPLETE: CPT | Performed by: INTERNAL MEDICINE

## 2019-01-01 PROCEDURE — 83550 IRON BINDING TEST: CPT | Performed by: NURSE PRACTITIONER

## 2019-01-01 PROCEDURE — 97530 THERAPEUTIC ACTIVITIES: CPT

## 2019-01-01 PROCEDURE — 83735 ASSAY OF MAGNESIUM: CPT | Performed by: INTERNAL MEDICINE

## 2019-01-01 PROCEDURE — 80048 BASIC METABOLIC PNL TOTAL CA: CPT

## 2019-01-01 PROCEDURE — 73030 X-RAY EXAM OF SHOULDER: CPT

## 2019-01-01 PROCEDURE — 93005 ELECTROCARDIOGRAM TRACING: CPT | Performed by: INTERNAL MEDICINE

## 2019-01-01 PROCEDURE — 99239 HOSP IP/OBS DSCHRG MGMT >30: CPT | Performed by: INTERNAL MEDICINE

## 2019-01-01 PROCEDURE — 85045 AUTOMATED RETICULOCYTE COUNT: CPT | Performed by: NURSE PRACTITIONER

## 2019-01-01 PROCEDURE — 84484 ASSAY OF TROPONIN QUANT: CPT

## 2019-01-01 PROCEDURE — 84484 ASSAY OF TROPONIN QUANT: CPT | Performed by: INTERNAL MEDICINE

## 2019-01-01 PROCEDURE — 83935 ASSAY OF URINE OSMOLALITY: CPT | Performed by: NURSE PRACTITIONER

## 2019-01-01 PROCEDURE — 80178 ASSAY OF LITHIUM: CPT | Performed by: EMERGENCY MEDICINE

## 2019-01-01 PROCEDURE — 71250 CT THORAX DX C-: CPT

## 2019-01-01 PROCEDURE — 80053 COMPREHEN METABOLIC PANEL: CPT | Performed by: EMERGENCY MEDICINE

## 2019-01-01 PROCEDURE — 85027 COMPLETE CBC AUTOMATED: CPT | Performed by: INTERNAL MEDICINE

## 2019-01-01 PROCEDURE — 82728 ASSAY OF FERRITIN: CPT | Performed by: NURSE PRACTITIONER

## 2019-01-01 PROCEDURE — 87581 M.PNEUMON DNA AMP PROBE: CPT

## 2019-01-01 PROCEDURE — 99285 EMERGENCY DEPT VISIT HI MDM: CPT

## 2019-01-01 PROCEDURE — 73590 X-RAY EXAM OF LOWER LEG: CPT

## 2019-01-01 PROCEDURE — 87581 M.PNEUMON DNA AMP PROBE: CPT | Performed by: INTERNAL MEDICINE

## 2019-01-01 PROCEDURE — 36600 WITHDRAWAL OF ARTERIAL BLOOD: CPT

## 2019-01-01 PROCEDURE — 70551 MRI BRAIN STEM W/O DYE: CPT

## 2019-01-01 PROCEDURE — 25010000002 LORAZEPAM PER 2 MG: Performed by: NURSE PRACTITIONER

## 2019-01-01 PROCEDURE — 80053 COMPREHEN METABOLIC PANEL: CPT | Performed by: INTERNAL MEDICINE

## 2019-01-01 PROCEDURE — 63710000001 QUETIAPINE 100 MG TABLET: Performed by: NURSE PRACTITIONER

## 2019-01-01 PROCEDURE — 83036 HEMOGLOBIN GLYCOSYLATED A1C: CPT | Performed by: INTERNAL MEDICINE

## 2019-01-01 PROCEDURE — 83540 ASSAY OF IRON: CPT | Performed by: INTERNAL MEDICINE

## 2019-01-01 PROCEDURE — 83550 IRON BINDING TEST: CPT | Performed by: INTERNAL MEDICINE

## 2019-01-01 PROCEDURE — 80178 ASSAY OF LITHIUM: CPT | Performed by: INTERNAL MEDICINE

## 2019-01-01 PROCEDURE — 97166 OT EVAL MOD COMPLEX 45 MIN: CPT

## 2019-01-01 PROCEDURE — 87486 CHLMYD PNEUM DNA AMP PROBE: CPT

## 2019-01-01 PROCEDURE — 85652 RBC SED RATE AUTOMATED: CPT

## 2019-01-01 PROCEDURE — 81003 URINALYSIS AUTO W/O SCOPE: CPT | Performed by: EMERGENCY MEDICINE

## 2019-01-01 PROCEDURE — 80048 BASIC METABOLIC PNL TOTAL CA: CPT | Performed by: NURSE PRACTITIONER

## 2019-01-01 PROCEDURE — 82272 OCCULT BLD FECES 1-3 TESTS: CPT | Performed by: INTERNAL MEDICINE

## 2019-01-01 PROCEDURE — 82570 ASSAY OF URINE CREATININE: CPT | Performed by: NURSE PRACTITIONER

## 2019-01-01 PROCEDURE — 85007 BL SMEAR W/DIFF WBC COUNT: CPT | Performed by: INTERNAL MEDICINE

## 2019-01-01 PROCEDURE — 82607 VITAMIN B-12: CPT | Performed by: INTERNAL MEDICINE

## 2019-01-01 PROCEDURE — 97530 THERAPEUTIC ACTIVITIES: CPT | Performed by: PHYSICAL THERAPIST

## 2019-01-01 PROCEDURE — 25010000002 AZITHROMYCIN: Performed by: EMERGENCY MEDICINE

## 2019-01-01 PROCEDURE — 93306 TTE W/DOPPLER COMPLETE: CPT

## 2019-01-01 PROCEDURE — 85014 HEMATOCRIT: CPT | Performed by: INTERNAL MEDICINE

## 2019-01-01 PROCEDURE — 87899 AGENT NOS ASSAY W/OPTIC: CPT | Performed by: INTERNAL MEDICINE

## 2019-01-01 PROCEDURE — 87040 BLOOD CULTURE FOR BACTERIA: CPT | Performed by: EMERGENCY MEDICINE

## 2019-01-01 PROCEDURE — 25010000002 ONDANSETRON PER 1 MG: Performed by: NURSE PRACTITIONER

## 2019-01-01 PROCEDURE — 83605 ASSAY OF LACTIC ACID: CPT | Performed by: EMERGENCY MEDICINE

## 2019-01-01 PROCEDURE — P9612 CATHETERIZE FOR URINE SPEC: HCPCS

## 2019-01-01 PROCEDURE — 25010000003 CEFTRIAXONE PER 250 MG: Performed by: EMERGENCY MEDICINE

## 2019-01-01 PROCEDURE — 97162 PT EVAL MOD COMPLEX 30 MIN: CPT

## 2019-01-01 PROCEDURE — 83540 ASSAY OF IRON: CPT | Performed by: NURSE PRACTITIONER

## 2019-01-01 PROCEDURE — 99219 PR INITIAL OBSERVATION CARE/DAY 50 MINUTES: CPT | Performed by: FAMILY MEDICINE

## 2019-01-01 PROCEDURE — 84443 ASSAY THYROID STIM HORMONE: CPT | Performed by: INTERNAL MEDICINE

## 2019-01-01 PROCEDURE — 85025 COMPLETE CBC W/AUTO DIFF WBC: CPT

## 2019-01-01 PROCEDURE — 82607 VITAMIN B-12: CPT | Performed by: NURSE PRACTITIONER

## 2019-01-01 RX ORDER — PNV NO.95/FERROUS FUM/FOLIC AC 28MG-0.8MG
325 TABLET ORAL SEE ADMIN INSTRUCTIONS
COMMUNITY

## 2019-01-01 RX ORDER — NIFEDIPINE 30 MG/1
30 TABLET, EXTENDED RELEASE ORAL DAILY
Status: ON HOLD | COMMUNITY
End: 2019-01-01 | Stop reason: SDUPTHER

## 2019-01-01 RX ORDER — BUMETANIDE 0.25 MG/ML
1 INJECTION INTRAMUSCULAR; INTRAVENOUS EVERY 12 HOURS SCHEDULED
Status: DISCONTINUED | OUTPATIENT
Start: 2019-01-01 | End: 2019-01-01 | Stop reason: HOSPADM

## 2019-01-01 RX ORDER — LITHIUM CARBONATE 150 MG/1
150 CAPSULE ORAL DAILY
Status: DISCONTINUED | OUTPATIENT
Start: 2019-01-01 | End: 2019-01-01 | Stop reason: HOSPADM

## 2019-01-01 RX ORDER — SACCHAROMYCES BOULARDII 250 MG
250 CAPSULE ORAL 2 TIMES DAILY
Status: DISCONTINUED | OUTPATIENT
Start: 2019-01-01 | End: 2019-01-01 | Stop reason: HOSPADM

## 2019-01-01 RX ORDER — MORPHINE SULFATE 10 MG/5ML
2.5 SOLUTION ORAL
Status: DISCONTINUED | OUTPATIENT
Start: 2019-01-01 | End: 2019-01-01 | Stop reason: HOSPADM

## 2019-01-01 RX ORDER — PREDNISONE 1 MG/1
5 TABLET ORAL
Status: DISCONTINUED | OUTPATIENT
Start: 2019-01-01 | End: 2019-01-01 | Stop reason: HOSPADM

## 2019-01-01 RX ORDER — DEXTROSE MONOHYDRATE 25 G/50ML
25 INJECTION, SOLUTION INTRAVENOUS
Status: DISCONTINUED | OUTPATIENT
Start: 2019-01-01 | End: 2019-01-01 | Stop reason: HOSPADM

## 2019-01-01 RX ORDER — FERROUS SULFATE 325(65) MG
325 TABLET ORAL
Status: DISCONTINUED | OUTPATIENT
Start: 2019-01-01 | End: 2019-01-01

## 2019-01-01 RX ORDER — AMLODIPINE BESYLATE 2.5 MG/1
2.5 TABLET ORAL
Status: DISCONTINUED | OUTPATIENT
Start: 2019-01-01 | End: 2019-01-01 | Stop reason: HOSPADM

## 2019-01-01 RX ORDER — CARVEDILOL 12.5 MG/1
12.5 TABLET ORAL 2 TIMES DAILY WITH MEALS
Status: DISCONTINUED | OUTPATIENT
Start: 2019-01-01 | End: 2019-01-01

## 2019-01-01 RX ORDER — HYDROCODONE BITARTRATE AND ACETAMINOPHEN 5; 325 MG/1; MG/1
0.5 TABLET ORAL EVERY 4 HOURS PRN
Status: DISCONTINUED | OUTPATIENT
Start: 2019-01-01 | End: 2019-01-01 | Stop reason: HOSPADM

## 2019-01-01 RX ORDER — POTASSIUM CHLORIDE 7.45 MG/ML
10 INJECTION INTRAVENOUS
Status: DISCONTINUED | OUTPATIENT
Start: 2019-01-01 | End: 2019-01-01 | Stop reason: DRUGHIGH

## 2019-01-01 RX ORDER — FUROSEMIDE 40 MG/1
40 TABLET ORAL DAILY
Status: DISCONTINUED | OUTPATIENT
Start: 2019-01-01 | End: 2019-01-01

## 2019-01-01 RX ORDER — LORAZEPAM 2 MG/ML
0.5 INJECTION INTRAMUSCULAR ONCE
Status: COMPLETED | OUTPATIENT
Start: 2019-01-01 | End: 2019-01-01

## 2019-01-01 RX ORDER — SODIUM CHLORIDE 0.9 % (FLUSH) 0.9 %
3-10 SYRINGE (ML) INJECTION AS NEEDED
Status: DISCONTINUED | OUTPATIENT
Start: 2019-01-01 | End: 2019-01-01 | Stop reason: HOSPADM

## 2019-01-01 RX ORDER — DICYCLOMINE HYDROCHLORIDE 10 MG/1
10 CAPSULE ORAL 2 TIMES DAILY PRN
Status: DISCONTINUED | OUTPATIENT
Start: 2019-01-01 | End: 2019-01-01 | Stop reason: HOSPADM

## 2019-01-01 RX ORDER — IBUPROFEN 200 MG
1 TABLET ORAL DAILY
COMMUNITY
Start: 2019-01-01

## 2019-01-01 RX ORDER — ACETAMINOPHEN 325 MG/1
650 TABLET ORAL EVERY 4 HOURS PRN
Status: DISCONTINUED | OUTPATIENT
Start: 2019-01-01 | End: 2019-01-01 | Stop reason: HOSPADM

## 2019-01-01 RX ORDER — SODIUM CHLORIDE 0.9 % (FLUSH) 0.9 %
3 SYRINGE (ML) INJECTION EVERY 12 HOURS SCHEDULED
Status: DISCONTINUED | OUTPATIENT
Start: 2019-01-01 | End: 2019-01-01 | Stop reason: HOSPADM

## 2019-01-01 RX ORDER — CEFTRIAXONE SODIUM 1 G/50ML
1 INJECTION, SOLUTION INTRAVENOUS EVERY 24 HOURS
Status: DISCONTINUED | OUTPATIENT
Start: 2019-01-01 | End: 2019-01-01

## 2019-01-01 RX ORDER — FUROSEMIDE 10 MG/ML
20 INJECTION INTRAMUSCULAR; INTRAVENOUS ONCE
Status: COMPLETED | OUTPATIENT
Start: 2019-01-01 | End: 2019-01-01

## 2019-01-01 RX ORDER — BISACODYL 10 MG
10 SUPPOSITORY, RECTAL RECTAL 2 TIMES DAILY PRN
Status: DISCONTINUED | OUTPATIENT
Start: 2019-01-01 | End: 2019-01-01 | Stop reason: HOSPADM

## 2019-01-01 RX ORDER — PANTOPRAZOLE SODIUM 40 MG/1
40 TABLET, DELAYED RELEASE ORAL
Status: DISCONTINUED | OUTPATIENT
Start: 2019-01-01 | End: 2019-01-01 | Stop reason: HOSPADM

## 2019-01-01 RX ORDER — FOLIC ACID 1 MG/1
500 TABLET ORAL DAILY
Status: DISCONTINUED | OUTPATIENT
Start: 2019-01-01 | End: 2019-01-01 | Stop reason: HOSPADM

## 2019-01-01 RX ORDER — ONDANSETRON 2 MG/ML
4 INJECTION INTRAMUSCULAR; INTRAVENOUS EVERY 6 HOURS PRN
Status: DISCONTINUED | OUTPATIENT
Start: 2019-01-01 | End: 2019-01-01 | Stop reason: HOSPADM

## 2019-01-01 RX ORDER — POTASSIUM CHLORIDE 1.5 G/1.77G
40 POWDER, FOR SOLUTION ORAL AS NEEDED
Status: DISCONTINUED | OUTPATIENT
Start: 2019-01-01 | End: 2019-01-01 | Stop reason: DRUGHIGH

## 2019-01-01 RX ORDER — LITHIUM CARBONATE 150 MG/1
150 CAPSULE ORAL DAILY
Status: DISCONTINUED | OUTPATIENT
Start: 2019-01-01 | End: 2019-01-01 | Stop reason: CLARIF

## 2019-01-01 RX ORDER — SENNA AND DOCUSATE SODIUM 50; 8.6 MG/1; MG/1
2 TABLET, FILM COATED ORAL NIGHTLY
Status: DISCONTINUED | OUTPATIENT
Start: 2019-01-01 | End: 2019-01-01 | Stop reason: HOSPADM

## 2019-01-01 RX ORDER — MAGNESIUM SULFATE HEPTAHYDRATE 40 MG/ML
2 INJECTION, SOLUTION INTRAVENOUS AS NEEDED
Status: DISCONTINUED | OUTPATIENT
Start: 2019-01-01 | End: 2019-01-01 | Stop reason: DRUGHIGH

## 2019-01-01 RX ORDER — LITHIUM CARBONATE 150 MG/1
150 CAPSULE ORAL DAILY
Status: DISCONTINUED | OUTPATIENT
Start: 2019-01-01 | End: 2019-01-01

## 2019-01-01 RX ORDER — IPRATROPIUM BROMIDE AND ALBUTEROL SULFATE 2.5; .5 MG/3ML; MG/3ML
3 SOLUTION RESPIRATORY (INHALATION) EVERY 4 HOURS PRN
Status: DISCONTINUED | OUTPATIENT
Start: 2019-01-01 | End: 2019-01-01 | Stop reason: HOSPADM

## 2019-01-01 RX ORDER — HYDROCORTISONE ACETATE 25 MG/1
25 SUPPOSITORY RECTAL 2 TIMES DAILY
Status: DISCONTINUED | OUTPATIENT
Start: 2019-01-01 | End: 2019-01-01 | Stop reason: HOSPADM

## 2019-01-01 RX ORDER — OSELTAMIVIR PHOSPHATE 30 MG/1
30 CAPSULE ORAL EVERY 24 HOURS
Status: COMPLETED | OUTPATIENT
Start: 2019-01-01 | End: 2019-01-01

## 2019-01-01 RX ORDER — IPRATROPIUM BROMIDE AND ALBUTEROL SULFATE 2.5; .5 MG/3ML; MG/3ML
3 SOLUTION RESPIRATORY (INHALATION) EVERY 4 HOURS PRN
Qty: 360 ML
Start: 2019-01-01

## 2019-01-01 RX ORDER — NICOTINE POLACRILEX 4 MG
15 LOZENGE BUCCAL
Status: DISCONTINUED | OUTPATIENT
Start: 2019-01-01 | End: 2019-01-01 | Stop reason: HOSPADM

## 2019-01-01 RX ORDER — HEPARIN SODIUM 5000 [USP'U]/ML
5000 INJECTION, SOLUTION INTRAVENOUS; SUBCUTANEOUS EVERY 12 HOURS SCHEDULED
Status: DISCONTINUED | OUTPATIENT
Start: 2019-01-01 | End: 2019-01-01 | Stop reason: HOSPADM

## 2019-01-01 RX ORDER — DICYCLOMINE HYDROCHLORIDE 10 MG/1
10 CAPSULE ORAL 2 TIMES DAILY
Status: DISCONTINUED | OUTPATIENT
Start: 2019-01-01 | End: 2019-01-01 | Stop reason: HOSPADM

## 2019-01-01 RX ORDER — POTASSIUM CHLORIDE 750 MG/1
40 CAPSULE, EXTENDED RELEASE ORAL AS NEEDED
Status: DISCONTINUED | OUTPATIENT
Start: 2019-01-01 | End: 2019-01-01 | Stop reason: DRUGHIGH

## 2019-01-01 RX ORDER — SODIUM CHLORIDE 0.9 % (FLUSH) 0.9 %
10 SYRINGE (ML) INJECTION AS NEEDED
Status: DISCONTINUED | OUTPATIENT
Start: 2019-01-01 | End: 2019-01-01 | Stop reason: HOSPADM

## 2019-01-01 RX ORDER — LORAZEPAM 2 MG/ML
INJECTION INTRAMUSCULAR
Status: DISCONTINUED
Start: 2019-01-01 | End: 2019-01-01 | Stop reason: HOSPADM

## 2019-01-01 RX ORDER — LEVOTHYROXINE SODIUM 88 UG/1
88 TABLET ORAL
Status: DISCONTINUED | OUTPATIENT
Start: 2019-01-01 | End: 2019-01-01 | Stop reason: HOSPADM

## 2019-01-01 RX ORDER — QUETIAPINE FUMARATE 100 MG/1
100 TABLET, FILM COATED ORAL NIGHTLY
Status: DISCONTINUED | OUTPATIENT
Start: 2019-01-01 | End: 2019-01-01 | Stop reason: HOSPADM

## 2019-01-01 RX ORDER — MAGNESIUM SULFATE HEPTAHYDRATE 40 MG/ML
4 INJECTION, SOLUTION INTRAVENOUS AS NEEDED
Status: DISCONTINUED | OUTPATIENT
Start: 2019-01-01 | End: 2019-01-01 | Stop reason: DRUGHIGH

## 2019-01-01 RX ORDER — ACETAMINOPHEN 325 MG/1
650 TABLET ORAL EVERY 6 HOURS PRN
Start: 2019-01-01

## 2019-01-01 RX ORDER — PANTOPRAZOLE SODIUM 40 MG/1
40 TABLET, DELAYED RELEASE ORAL
Status: DISCONTINUED | OUTPATIENT
Start: 2019-01-01 | End: 2019-01-01

## 2019-01-01 RX ORDER — CEFTRIAXONE SODIUM 2 G/50ML
2 INJECTION, SOLUTION INTRAVENOUS ONCE
Status: COMPLETED | OUTPATIENT
Start: 2019-01-01 | End: 2019-01-01

## 2019-01-01 RX ORDER — FOLIC ACID 1 MG/1
500 TABLET ORAL DAILY
Start: 2019-01-01

## 2019-01-01 RX ORDER — FUROSEMIDE 40 MG/1
40 TABLET ORAL DAILY
Status: DISCONTINUED | OUTPATIENT
Start: 2019-01-01 | End: 2019-01-01 | Stop reason: HOSPADM

## 2019-01-01 RX ORDER — ONDANSETRON 4 MG/1
4 TABLET, FILM COATED ORAL EVERY 6 HOURS PRN
Status: DISCONTINUED | OUTPATIENT
Start: 2019-01-01 | End: 2019-01-01 | Stop reason: HOSPADM

## 2019-01-01 RX ORDER — IPRATROPIUM BROMIDE AND ALBUTEROL SULFATE 2.5; .5 MG/3ML; MG/3ML
3 SOLUTION RESPIRATORY (INHALATION)
Status: DISCONTINUED | OUTPATIENT
Start: 2019-01-01 | End: 2019-01-01

## 2019-01-01 RX ORDER — HEPARIN SODIUM 5000 [USP'U]/ML
5000 INJECTION, SOLUTION INTRAVENOUS; SUBCUTANEOUS EVERY 8 HOURS SCHEDULED
Status: DISCONTINUED | OUTPATIENT
Start: 2019-01-01 | End: 2019-01-01

## 2019-01-01 RX ORDER — LITHIUM 8 MEQ/5ML
150 SOLUTION ORAL DAILY
Status: DISCONTINUED | OUTPATIENT
Start: 2019-01-01 | End: 2019-01-01 | Stop reason: HOSPADM

## 2019-01-01 RX ORDER — SODIUM CHLORIDE 9 MG/ML
50 INJECTION, SOLUTION INTRAVENOUS CONTINUOUS
Status: DISCONTINUED | OUTPATIENT
Start: 2019-01-01 | End: 2019-01-01

## 2019-01-01 RX ORDER — ACETAMINOPHEN 325 MG/1
650 TABLET ORAL EVERY 6 HOURS PRN
Status: DISCONTINUED | OUTPATIENT
Start: 2019-01-01 | End: 2019-01-01 | Stop reason: HOSPADM

## 2019-01-01 RX ORDER — CARVEDILOL 12.5 MG/1
12.5 TABLET ORAL 2 TIMES DAILY WITH MEALS
Status: DISCONTINUED | OUTPATIENT
Start: 2019-01-01 | End: 2019-01-01 | Stop reason: HOSPADM

## 2019-01-01 RX ORDER — NITROGLYCERIN 0.4 MG/1
0.4 TABLET SUBLINGUAL
Status: DISCONTINUED | OUTPATIENT
Start: 2019-01-01 | End: 2019-01-01 | Stop reason: HOSPADM

## 2019-01-01 RX ORDER — NIFEDIPINE 60 MG/1
60 TABLET, EXTENDED RELEASE ORAL DAILY
Status: DISCONTINUED | OUTPATIENT
Start: 2019-01-01 | End: 2019-01-01

## 2019-01-01 RX ORDER — SUCRALFATE 1 G/1
1 TABLET ORAL
Status: DISCONTINUED | OUTPATIENT
Start: 2019-01-01 | End: 2019-01-01 | Stop reason: HOSPADM

## 2019-01-01 RX ORDER — CARVEDILOL 3.12 MG/1
3.12 TABLET ORAL EVERY 12 HOURS SCHEDULED
Status: DISCONTINUED | OUTPATIENT
Start: 2019-01-01 | End: 2019-01-01

## 2019-01-01 RX ORDER — LEVOFLOXACIN 500 MG/1
500 TABLET, FILM COATED ORAL DAILY
COMMUNITY
Start: 2019-01-01 | End: 2019-01-01

## 2019-01-01 RX ORDER — NIFEDIPINE 60 MG/1
60 TABLET, EXTENDED RELEASE ORAL DAILY
COMMUNITY

## 2019-01-01 RX ORDER — CARVEDILOL 25 MG/1
25 TABLET ORAL 2 TIMES DAILY WITH MEALS
Start: 2019-01-01

## 2019-01-01 RX ORDER — SENNA AND DOCUSATE SODIUM 50; 8.6 MG/1; MG/1
2 TABLET, FILM COATED ORAL 2 TIMES DAILY
Status: DISCONTINUED | OUTPATIENT
Start: 2019-01-01 | End: 2019-01-01 | Stop reason: HOSPADM

## 2019-01-01 RX ORDER — NIFEDIPINE 30 MG/1
60 TABLET, EXTENDED RELEASE ORAL DAILY
Status: ON HOLD
Start: 2019-01-01 | End: 2019-01-01

## 2019-01-01 RX ORDER — CARVEDILOL 12.5 MG/1
25 TABLET ORAL 2 TIMES DAILY WITH MEALS
Status: DISCONTINUED | OUTPATIENT
Start: 2019-01-01 | End: 2019-01-01

## 2019-01-01 RX ORDER — PANTOPRAZOLE SODIUM 40 MG/1
40 TABLET, DELAYED RELEASE ORAL DAILY
Status: DISCONTINUED | OUTPATIENT
Start: 2019-01-01 | End: 2019-01-01 | Stop reason: HOSPADM

## 2019-01-01 RX ORDER — CEFTRIAXONE SODIUM 1 G/50ML
1 INJECTION, SOLUTION INTRAVENOUS
Status: COMPLETED | OUTPATIENT
Start: 2019-01-01 | End: 2019-01-01

## 2019-01-01 RX ORDER — HYDROCORTISONE ACETATE 25 MG/1
25 SUPPOSITORY RECTAL 2 TIMES DAILY PRN
COMMUNITY

## 2019-01-01 RX ORDER — METHYLPREDNISOLONE SODIUM SUCCINATE 40 MG/ML
40 INJECTION, POWDER, LYOPHILIZED, FOR SOLUTION INTRAMUSCULAR; INTRAVENOUS EVERY 12 HOURS
Status: DISCONTINUED | OUTPATIENT
Start: 2019-01-01 | End: 2019-01-01

## 2019-01-01 RX ADMIN — SENNOSIDES AND DOCUSATE SODIUM 2 TABLET: 8.6; 5 TABLET ORAL at 21:13

## 2019-01-01 RX ADMIN — IPRATROPIUM BROMIDE AND ALBUTEROL SULFATE 3 ML: 2.5; .5 SOLUTION RESPIRATORY (INHALATION) at 11:24

## 2019-01-01 RX ADMIN — IPRATROPIUM BROMIDE AND ALBUTEROL SULFATE 3 ML: 2.5; .5 SOLUTION RESPIRATORY (INHALATION) at 07:35

## 2019-01-01 RX ADMIN — INSULIN LISPRO 2 UNITS: 100 INJECTION, SOLUTION INTRAVENOUS; SUBCUTANEOUS at 17:53

## 2019-01-01 RX ADMIN — BUMETANIDE 1 MG: 0.25 INJECTION INTRAMUSCULAR; INTRAVENOUS at 20:36

## 2019-01-01 RX ADMIN — IPRATROPIUM BROMIDE AND ALBUTEROL SULFATE 3 ML: 2.5; .5 SOLUTION RESPIRATORY (INHALATION) at 07:14

## 2019-01-01 RX ADMIN — PREDNISONE 5 MG: 5 TABLET ORAL at 07:56

## 2019-01-01 RX ADMIN — CARVEDILOL 12.5 MG: 12.5 TABLET, FILM COATED ORAL at 08:16

## 2019-01-01 RX ADMIN — CARVEDILOL 12.5 MG: 12.5 TABLET, FILM COATED ORAL at 17:53

## 2019-01-01 RX ADMIN — FOLIC ACID 500 MCG: 1 TABLET ORAL at 09:02

## 2019-01-01 RX ADMIN — SODIUM CHLORIDE 125 MG: 9 INJECTION, SOLUTION INTRAVENOUS at 08:53

## 2019-01-01 RX ADMIN — FOLIC ACID 500 MCG: 1 TABLET ORAL at 08:43

## 2019-01-01 RX ADMIN — DICYCLOMINE HYDROCHLORIDE 10 MG: 10 CAPSULE ORAL at 10:52

## 2019-01-01 RX ADMIN — HEPARIN SODIUM 5000 UNITS: 5000 INJECTION INTRAVENOUS; SUBCUTANEOUS at 05:29

## 2019-01-01 RX ADMIN — IPRATROPIUM BROMIDE AND ALBUTEROL SULFATE 3 ML: 2.5; .5 SOLUTION RESPIRATORY (INHALATION) at 03:39

## 2019-01-01 RX ADMIN — BUMETANIDE 1 MG: 0.25 INJECTION INTRAMUSCULAR; INTRAVENOUS at 10:22

## 2019-01-01 RX ADMIN — IPRATROPIUM BROMIDE AND ALBUTEROL SULFATE 3 ML: 2.5; .5 SOLUTION RESPIRATORY (INHALATION) at 12:17

## 2019-01-01 RX ADMIN — DOXYCYCLINE 100 MG: 100 INJECTION, POWDER, LYOPHILIZED, FOR SOLUTION INTRAVENOUS at 12:01

## 2019-01-01 RX ADMIN — CARVEDILOL 12.5 MG: 12.5 TABLET, FILM COATED ORAL at 17:12

## 2019-01-01 RX ADMIN — INSULIN LISPRO 3 UNITS: 100 INJECTION, SOLUTION INTRAVENOUS; SUBCUTANEOUS at 17:27

## 2019-01-01 RX ADMIN — LITHIUM 150 MG: 8 SOLUTION ORAL at 08:37

## 2019-01-01 RX ADMIN — HYDROCORTISONE ACETATE 25 MG: 25 SUPPOSITORY RECTAL at 21:11

## 2019-01-01 RX ADMIN — CARVEDILOL 12.5 MG: 12.5 TABLET, FILM COATED ORAL at 08:43

## 2019-01-01 RX ADMIN — DICYCLOMINE HYDROCHLORIDE 10 MG: 10 CAPSULE ORAL at 21:33

## 2019-01-01 RX ADMIN — CARVEDILOL 12.5 MG: 12.5 TABLET, FILM COATED ORAL at 17:45

## 2019-01-01 RX ADMIN — SODIUM CHLORIDE 125 MG: 9 INJECTION, SOLUTION INTRAVENOUS at 08:50

## 2019-01-01 RX ADMIN — IPRATROPIUM BROMIDE AND ALBUTEROL SULFATE 3 ML: 2.5; .5 SOLUTION RESPIRATORY (INHALATION) at 14:57

## 2019-01-01 RX ADMIN — FOLIC ACID 500 MCG: 1 TABLET ORAL at 10:54

## 2019-01-01 RX ADMIN — FUROSEMIDE 40 MG: 40 TABLET ORAL at 07:56

## 2019-01-01 RX ADMIN — Medication 325 MG: at 08:43

## 2019-01-01 RX ADMIN — Medication 250 MG: at 07:56

## 2019-01-01 RX ADMIN — QUETIAPINE FUMARATE 100 MG: 100 TABLET ORAL at 21:34

## 2019-01-01 RX ADMIN — INSULIN LISPRO 2 UNITS: 100 INJECTION, SOLUTION INTRAVENOUS; SUBCUTANEOUS at 21:14

## 2019-01-01 RX ADMIN — INSULIN LISPRO 5 UNITS: 100 INJECTION, SOLUTION INTRAVENOUS; SUBCUTANEOUS at 18:13

## 2019-01-01 RX ADMIN — SODIUM CHLORIDE, PRESERVATIVE FREE 3 ML: 5 INJECTION INTRAVENOUS at 08:45

## 2019-01-01 RX ADMIN — SENNOSIDES AND DOCUSATE SODIUM 2 TABLET: 8.6; 5 TABLET ORAL at 21:31

## 2019-01-01 RX ADMIN — BUMETANIDE 1 MG: 0.25 INJECTION INTRAMUSCULAR; INTRAVENOUS at 13:52

## 2019-01-01 RX ADMIN — INSULIN LISPRO 2 UNITS: 100 INJECTION, SOLUTION INTRAVENOUS; SUBCUTANEOUS at 17:44

## 2019-01-01 RX ADMIN — SODIUM CHLORIDE, PRESERVATIVE FREE 3 ML: 5 INJECTION INTRAVENOUS at 09:02

## 2019-01-01 RX ADMIN — HEPARIN SODIUM 5000 UNITS: 5000 INJECTION INTRAVENOUS; SUBCUTANEOUS at 08:23

## 2019-01-01 RX ADMIN — HEPARIN SODIUM 5000 UNITS: 5000 INJECTION INTRAVENOUS; SUBCUTANEOUS at 08:49

## 2019-01-01 RX ADMIN — NITROGLYCERIN 0.4 MG: 0.4 TABLET SUBLINGUAL at 16:49

## 2019-01-01 RX ADMIN — IPRATROPIUM BROMIDE AND ALBUTEROL SULFATE 3 ML: 2.5; .5 SOLUTION RESPIRATORY (INHALATION) at 23:03

## 2019-01-01 RX ADMIN — DICYCLOMINE HYDROCHLORIDE 10 MG: 10 CAPSULE ORAL at 20:09

## 2019-01-01 RX ADMIN — PANTOPRAZOLE SODIUM 40 MG: 40 TABLET, DELAYED RELEASE ORAL at 09:02

## 2019-01-01 RX ADMIN — PANTOPRAZOLE SODIUM 40 MG: 40 TABLET, DELAYED RELEASE ORAL at 17:12

## 2019-01-01 RX ADMIN — SODIUM CHLORIDE, PRESERVATIVE FREE 3 ML: 5 INJECTION INTRAVENOUS at 21:14

## 2019-01-01 RX ADMIN — HEPARIN SODIUM 5000 UNITS: 5000 INJECTION INTRAVENOUS; SUBCUTANEOUS at 21:31

## 2019-01-01 RX ADMIN — HYDROCORTISONE ACETATE 25 MG: 25 SUPPOSITORY RECTAL at 08:43

## 2019-01-01 RX ADMIN — HEPARIN SODIUM 5000 UNITS: 5000 INJECTION INTRAVENOUS; SUBCUTANEOUS at 20:22

## 2019-01-01 RX ADMIN — HEPARIN SODIUM 5000 UNITS: 5000 INJECTION INTRAVENOUS; SUBCUTANEOUS at 21:13

## 2019-01-01 RX ADMIN — MORPHINE SULFATE 2.5 MG: 10 SOLUTION ORAL at 16:57

## 2019-01-01 RX ADMIN — FOLIC ACID 500 MCG: 1 TABLET ORAL at 17:12

## 2019-01-01 RX ADMIN — IPRATROPIUM BROMIDE AND ALBUTEROL SULFATE 3 ML: 2.5; .5 SOLUTION RESPIRATORY (INHALATION) at 02:35

## 2019-01-01 RX ADMIN — IPRATROPIUM BROMIDE AND ALBUTEROL SULFATE 3 ML: 2.5; .5 SOLUTION RESPIRATORY (INHALATION) at 20:18

## 2019-01-01 RX ADMIN — PANTOPRAZOLE SODIUM 40 MG: 40 TABLET, DELAYED RELEASE ORAL at 05:51

## 2019-01-01 RX ADMIN — FUROSEMIDE 40 MG: 40 TABLET ORAL at 08:35

## 2019-01-01 RX ADMIN — SENNOSIDES AND DOCUSATE SODIUM 2 TABLET: 8.6; 5 TABLET ORAL at 20:19

## 2019-01-01 RX ADMIN — PANTOPRAZOLE SODIUM 40 MG: 40 TABLET, DELAYED RELEASE ORAL at 17:11

## 2019-01-01 RX ADMIN — SODIUM CHLORIDE 50 ML/HR: 9 INJECTION, SOLUTION INTRAVENOUS at 09:05

## 2019-01-01 RX ADMIN — LITHIUM CARBONATE 150 MG: 150 CAPSULE ORAL at 09:03

## 2019-01-01 RX ADMIN — PANTOPRAZOLE SODIUM 40 MG: 40 TABLET, DELAYED RELEASE ORAL at 17:53

## 2019-01-01 RX ADMIN — LEVOTHYROXINE SODIUM 88 MCG: 88 TABLET ORAL at 05:26

## 2019-01-01 RX ADMIN — LITHIUM 150 MG: 8 SOLUTION ORAL at 08:24

## 2019-01-01 RX ADMIN — OSELTAMIVIR PHOSPHATE 30 MG: 30 CAPSULE ORAL at 21:10

## 2019-01-01 RX ADMIN — LEVOTHYROXINE SODIUM 88 MCG: 88 TABLET ORAL at 05:29

## 2019-01-01 RX ADMIN — CARVEDILOL 12.5 MG: 12.5 TABLET, FILM COATED ORAL at 17:26

## 2019-01-01 RX ADMIN — FOLIC ACID 500 MCG: 1 TABLET ORAL at 08:50

## 2019-01-01 RX ADMIN — CARVEDILOL 12.5 MG: 12.5 TABLET, FILM COATED ORAL at 08:44

## 2019-01-01 RX ADMIN — DOXYCYCLINE 100 MG: 100 INJECTION, POWDER, LYOPHILIZED, FOR SOLUTION INTRAVENOUS at 12:26

## 2019-01-01 RX ADMIN — IPRATROPIUM BROMIDE AND ALBUTEROL SULFATE 3 ML: 2.5; .5 SOLUTION RESPIRATORY (INHALATION) at 07:57

## 2019-01-01 RX ADMIN — QUETIAPINE FUMARATE 100 MG: 100 TABLET ORAL at 20:47

## 2019-01-01 RX ADMIN — IPRATROPIUM BROMIDE AND ALBUTEROL SULFATE 3 ML: 2.5; .5 SOLUTION RESPIRATORY (INHALATION) at 19:31

## 2019-01-01 RX ADMIN — Medication 250 MG: at 08:44

## 2019-01-01 RX ADMIN — FUROSEMIDE 40 MG: 40 TABLET ORAL at 08:44

## 2019-01-01 RX ADMIN — HEPARIN SODIUM 5000 UNITS: 5000 INJECTION INTRAVENOUS; SUBCUTANEOUS at 08:44

## 2019-01-01 RX ADMIN — NITROGLYCERIN 0.4 MG: 0.4 TABLET SUBLINGUAL at 20:48

## 2019-01-01 RX ADMIN — LITHIUM 150 MG: 8 SOLUTION ORAL at 12:00

## 2019-01-01 RX ADMIN — Medication 250 MG: at 20:45

## 2019-01-01 RX ADMIN — SENNOSIDES AND DOCUSATE SODIUM 2 TABLET: 8.6; 5 TABLET ORAL at 08:24

## 2019-01-01 RX ADMIN — PREDNISONE 5 MG: 5 TABLET ORAL at 08:44

## 2019-01-01 RX ADMIN — IPRATROPIUM BROMIDE AND ALBUTEROL SULFATE 3 ML: 2.5; .5 SOLUTION RESPIRATORY (INHALATION) at 23:25

## 2019-01-01 RX ADMIN — INSULIN LISPRO 2 UNITS: 100 INJECTION, SOLUTION INTRAVENOUS; SUBCUTANEOUS at 12:04

## 2019-01-01 RX ADMIN — SODIUM CHLORIDE, PRESERVATIVE FREE 3 ML: 5 INJECTION INTRAVENOUS at 20:22

## 2019-01-01 RX ADMIN — HYDROCORTISONE ACETATE 25 MG: 25 SUPPOSITORY RECTAL at 08:17

## 2019-01-01 RX ADMIN — FOLIC ACID 500 MCG: 1 TABLET ORAL at 08:35

## 2019-01-01 RX ADMIN — LEVOTHYROXINE SODIUM 88 MCG: 88 TABLET ORAL at 05:51

## 2019-01-01 RX ADMIN — DOXYCYCLINE 100 MG: 100 INJECTION, POWDER, LYOPHILIZED, FOR SOLUTION INTRAVENOUS at 12:11

## 2019-01-01 RX ADMIN — CEFTRIAXONE SODIUM 2 G: 2 INJECTION, SOLUTION INTRAVENOUS at 19:29

## 2019-01-01 RX ADMIN — IPRATROPIUM BROMIDE AND ALBUTEROL SULFATE 3 ML: 2.5; .5 SOLUTION RESPIRATORY (INHALATION) at 12:30

## 2019-01-01 RX ADMIN — FUROSEMIDE 20 MG: 10 INJECTION, SOLUTION INTRAMUSCULAR; INTRAVENOUS at 18:13

## 2019-01-01 RX ADMIN — SODIUM CHLORIDE, PRESERVATIVE FREE 3 ML: 5 INJECTION INTRAVENOUS at 08:26

## 2019-01-01 RX ADMIN — Medication 250 MG: at 08:35

## 2019-01-01 RX ADMIN — PANTOPRAZOLE SODIUM 40 MG: 40 TABLET, DELAYED RELEASE ORAL at 09:01

## 2019-01-01 RX ADMIN — IPRATROPIUM BROMIDE AND ALBUTEROL SULFATE 3 ML: 2.5; .5 SOLUTION RESPIRATORY (INHALATION) at 19:11

## 2019-01-01 RX ADMIN — BUMETANIDE 1 MG: 0.25 INJECTION INTRAMUSCULAR; INTRAVENOUS at 20:47

## 2019-01-01 RX ADMIN — NITROGLYCERIN 0.4 MG: 0.4 TABLET SUBLINGUAL at 16:54

## 2019-01-01 RX ADMIN — CARVEDILOL 12.5 MG: 12.5 TABLET, FILM COATED ORAL at 07:56

## 2019-01-01 RX ADMIN — NIFEDIPINE 90 MG: 60 TABLET, FILM COATED, EXTENDED RELEASE ORAL at 09:03

## 2019-01-01 RX ADMIN — DOXYCYCLINE 100 MG: 100 INJECTION, POWDER, LYOPHILIZED, FOR SOLUTION INTRAVENOUS at 21:13

## 2019-01-01 RX ADMIN — LITHIUM CARBONATE 150 MG: 150 CAPSULE ORAL at 14:13

## 2019-01-01 RX ADMIN — HYDROCORTISONE ACETATE 25 MG: 25 SUPPOSITORY RECTAL at 20:46

## 2019-01-01 RX ADMIN — IPRATROPIUM BROMIDE AND ALBUTEROL SULFATE 3 ML: 2.5; .5 SOLUTION RESPIRATORY (INHALATION) at 14:56

## 2019-01-01 RX ADMIN — LEVOTHYROXINE SODIUM 88 MCG: 88 TABLET ORAL at 06:45

## 2019-01-01 RX ADMIN — DOXYCYCLINE 100 MG: 100 INJECTION, POWDER, LYOPHILIZED, FOR SOLUTION INTRAVENOUS at 07:58

## 2019-01-01 RX ADMIN — IPRATROPIUM BROMIDE AND ALBUTEROL SULFATE 3 ML: 2.5; .5 SOLUTION RESPIRATORY (INHALATION) at 15:55

## 2019-01-01 RX ADMIN — PANTOPRAZOLE SODIUM 40 MG: 40 TABLET, DELAYED RELEASE ORAL at 08:35

## 2019-01-01 RX ADMIN — SODIUM CHLORIDE 125 MG: 9 INJECTION, SOLUTION INTRAVENOUS at 11:48

## 2019-01-01 RX ADMIN — DOXYCYCLINE 100 MG: 100 INJECTION, POWDER, LYOPHILIZED, FOR SOLUTION INTRAVENOUS at 01:17

## 2019-01-01 RX ADMIN — INSULIN LISPRO 3 UNITS: 100 INJECTION, SOLUTION INTRAVENOUS; SUBCUTANEOUS at 21:08

## 2019-01-01 RX ADMIN — OSELTAMIVIR PHOSPHATE 30 MG: 30 CAPSULE ORAL at 21:17

## 2019-01-01 RX ADMIN — SODIUM CHLORIDE 75 ML/HR: 9 INJECTION, SOLUTION INTRAVENOUS at 21:43

## 2019-01-01 RX ADMIN — QUETIAPINE FUMARATE 100 MG: 100 TABLET ORAL at 20:15

## 2019-01-01 RX ADMIN — DOXYCYCLINE 100 MG: 100 INJECTION, POWDER, LYOPHILIZED, FOR SOLUTION INTRAVENOUS at 23:53

## 2019-01-01 RX ADMIN — OSELTAMIVIR PHOSPHATE 30 MG: 30 CAPSULE ORAL at 20:46

## 2019-01-01 RX ADMIN — PANTOPRAZOLE SODIUM 40 MG: 40 TABLET, DELAYED RELEASE ORAL at 17:26

## 2019-01-01 RX ADMIN — DICYCLOMINE HYDROCHLORIDE 10 MG: 10 CAPSULE ORAL at 20:47

## 2019-01-01 RX ADMIN — HYDROCORTISONE ACETATE 25 MG: 25 SUPPOSITORY RECTAL at 07:57

## 2019-01-01 RX ADMIN — HEPARIN SODIUM 5000 UNITS: 5000 INJECTION INTRAVENOUS; SUBCUTANEOUS at 20:48

## 2019-01-01 RX ADMIN — SODIUM CHLORIDE 500 ML: 9 INJECTION, SOLUTION INTRAVENOUS at 17:23

## 2019-01-01 RX ADMIN — PANTOPRAZOLE SODIUM 40 MG: 40 TABLET, DELAYED RELEASE ORAL at 08:44

## 2019-01-01 RX ADMIN — LITHIUM 150 MG: 8 SOLUTION ORAL at 08:40

## 2019-01-01 RX ADMIN — PANTOPRAZOLE SODIUM 40 MG: 40 TABLET, DELAYED RELEASE ORAL at 05:16

## 2019-01-01 RX ADMIN — HYDROCORTISONE ACETATE 25 MG: 25 SUPPOSITORY RECTAL at 21:31

## 2019-01-01 RX ADMIN — INSULIN LISPRO 5 UNITS: 100 INJECTION, SOLUTION INTRAVENOUS; SUBCUTANEOUS at 21:11

## 2019-01-01 RX ADMIN — HEPARIN SODIUM 5000 UNITS: 5000 INJECTION INTRAVENOUS; SUBCUTANEOUS at 23:33

## 2019-01-01 RX ADMIN — INSULIN LISPRO 3 UNITS: 100 INJECTION, SOLUTION INTRAVENOUS; SUBCUTANEOUS at 21:17

## 2019-01-01 RX ADMIN — SODIUM CHLORIDE, PRESERVATIVE FREE 3 ML: 5 INJECTION INTRAVENOUS at 08:36

## 2019-01-01 RX ADMIN — INSULIN LISPRO 2 UNITS: 100 INJECTION, SOLUTION INTRAVENOUS; SUBCUTANEOUS at 18:29

## 2019-01-01 RX ADMIN — HYDROCORTISONE ACETATE 25 MG: 25 SUPPOSITORY RECTAL at 08:35

## 2019-01-01 RX ADMIN — IPRATROPIUM BROMIDE AND ALBUTEROL SULFATE 3 ML: 2.5; .5 SOLUTION RESPIRATORY (INHALATION) at 03:58

## 2019-01-01 RX ADMIN — CARVEDILOL 12.5 MG: 12.5 TABLET, FILM COATED ORAL at 08:50

## 2019-01-01 RX ADMIN — SODIUM CHLORIDE, PRESERVATIVE FREE 3 ML: 5 INJECTION INTRAVENOUS at 22:29

## 2019-01-01 RX ADMIN — QUETIAPINE FUMARATE 100 MG: 100 TABLET ORAL at 21:31

## 2019-01-01 RX ADMIN — FUROSEMIDE 40 MG: 40 TABLET ORAL at 08:24

## 2019-01-01 RX ADMIN — SODIUM CHLORIDE, PRESERVATIVE FREE 3 ML: 5 INJECTION INTRAVENOUS at 20:47

## 2019-01-01 RX ADMIN — Medication 250 MG: at 20:19

## 2019-01-01 RX ADMIN — HYDROCORTISONE ACETATE 25 MG: 25 SUPPOSITORY RECTAL at 20:19

## 2019-01-01 RX ADMIN — QUETIAPINE FUMARATE 100 MG: 100 TABLET ORAL at 21:13

## 2019-01-01 RX ADMIN — FUROSEMIDE 40 MG: 40 TABLET ORAL at 17:11

## 2019-01-01 RX ADMIN — PREDNISONE 5 MG: 5 TABLET ORAL at 08:23

## 2019-01-01 RX ADMIN — IPRATROPIUM BROMIDE AND ALBUTEROL SULFATE 3 ML: 2.5; .5 SOLUTION RESPIRATORY (INHALATION) at 19:51

## 2019-01-01 RX ADMIN — ACETAMINOPHEN 650 MG: 325 TABLET ORAL at 16:26

## 2019-01-01 RX ADMIN — LEVOTHYROXINE SODIUM 88 MCG: 88 TABLET ORAL at 05:16

## 2019-01-01 RX ADMIN — CARVEDILOL 12.5 MG: 12.5 TABLET, FILM COATED ORAL at 23:34

## 2019-01-01 RX ADMIN — OSELTAMIVIR PHOSPHATE 30 MG: 30 CAPSULE ORAL at 23:34

## 2019-01-01 RX ADMIN — HYDROCODONE BITARTRATE AND ACETAMINOPHEN 0.5 TABLET: 5; 325 TABLET ORAL at 15:06

## 2019-01-01 RX ADMIN — LEVOTHYROXINE SODIUM 88 MCG: 88 TABLET ORAL at 06:40

## 2019-01-01 RX ADMIN — IPRATROPIUM BROMIDE AND ALBUTEROL SULFATE 3 ML: 2.5; .5 SOLUTION RESPIRATORY (INHALATION) at 06:50

## 2019-01-01 RX ADMIN — HEPARIN SODIUM 5000 UNITS: 5000 INJECTION INTRAVENOUS; SUBCUTANEOUS at 09:01

## 2019-01-01 RX ADMIN — QUETIAPINE FUMARATE 100 MG: 100 TABLET ORAL at 23:34

## 2019-01-01 RX ADMIN — IPRATROPIUM BROMIDE AND ALBUTEROL SULFATE 3 ML: 2.5; .5 SOLUTION RESPIRATORY (INHALATION) at 00:18

## 2019-01-01 RX ADMIN — Medication 250 MG: at 21:13

## 2019-01-01 RX ADMIN — INSULIN LISPRO 2 UNITS: 100 INJECTION, SOLUTION INTRAVENOUS; SUBCUTANEOUS at 16:22

## 2019-01-01 RX ADMIN — Medication 325 MG: at 10:53

## 2019-01-01 RX ADMIN — HEPARIN SODIUM 5000 UNITS: 5000 INJECTION INTRAVENOUS; SUBCUTANEOUS at 05:16

## 2019-01-01 RX ADMIN — SODIUM CHLORIDE 75 ML/HR: 9 INJECTION, SOLUTION INTRAVENOUS at 16:28

## 2019-01-01 RX ADMIN — CEFTRIAXONE SODIUM 1 G: 1 INJECTION, SOLUTION INTRAVENOUS at 09:59

## 2019-01-01 RX ADMIN — Medication 250 MG: at 21:31

## 2019-01-01 RX ADMIN — IPRATROPIUM BROMIDE AND ALBUTEROL SULFATE 3 ML: 2.5; .5 SOLUTION RESPIRATORY (INHALATION) at 23:28

## 2019-01-01 RX ADMIN — CARVEDILOL 12.5 MG: 12.5 TABLET, FILM COATED ORAL at 08:23

## 2019-01-01 RX ADMIN — LITHIUM 150 MG: 8 SOLUTION ORAL at 07:55

## 2019-01-01 RX ADMIN — IPRATROPIUM BROMIDE AND ALBUTEROL SULFATE 3 ML: 2.5; .5 SOLUTION RESPIRATORY (INHALATION) at 11:40

## 2019-01-01 RX ADMIN — HEPARIN SODIUM 5000 UNITS: 5000 INJECTION INTRAVENOUS; SUBCUTANEOUS at 08:36

## 2019-01-01 RX ADMIN — PANTOPRAZOLE SODIUM 40 MG: 40 TABLET, DELAYED RELEASE ORAL at 17:44

## 2019-01-01 RX ADMIN — SODIUM CHLORIDE, PRESERVATIVE FREE 3 ML: 5 INJECTION INTRAVENOUS at 20:37

## 2019-01-01 RX ADMIN — HEPARIN SODIUM 5000 UNITS: 5000 INJECTION INTRAVENOUS; SUBCUTANEOUS at 17:16

## 2019-01-01 RX ADMIN — LITHIUM CARBONATE 150 MG: 150 CAPSULE ORAL at 09:01

## 2019-01-01 RX ADMIN — LEVOTHYROXINE SODIUM 88 MCG: 88 TABLET ORAL at 05:36

## 2019-01-01 RX ADMIN — NIFEDIPINE 90 MG: 60 TABLET, FILM COATED, EXTENDED RELEASE ORAL at 10:52

## 2019-01-01 RX ADMIN — AMLODIPINE BESYLATE 2.5 MG: 2.5 TABLET ORAL at 07:56

## 2019-01-01 RX ADMIN — QUETIAPINE FUMARATE 100 MG: 100 TABLET ORAL at 20:19

## 2019-01-01 RX ADMIN — SODIUM CHLORIDE, PRESERVATIVE FREE 3 ML: 5 INJECTION INTRAVENOUS at 07:57

## 2019-01-01 RX ADMIN — HEPARIN SODIUM 5000 UNITS: 5000 INJECTION INTRAVENOUS; SUBCUTANEOUS at 20:46

## 2019-01-01 RX ADMIN — FOLIC ACID 500 MCG: 1 TABLET ORAL at 09:01

## 2019-01-01 RX ADMIN — HEPARIN SODIUM 5000 UNITS: 5000 INJECTION INTRAVENOUS; SUBCUTANEOUS at 20:15

## 2019-01-01 RX ADMIN — Medication 250 MG: at 08:24

## 2019-01-01 RX ADMIN — DOXYCYCLINE 100 MG: 100 INJECTION, POWDER, LYOPHILIZED, FOR SOLUTION INTRAVENOUS at 20:45

## 2019-01-01 RX ADMIN — SODIUM CHLORIDE 125 MG: 9 INJECTION, SOLUTION INTRAVENOUS at 17:12

## 2019-01-01 RX ADMIN — INSULIN LISPRO 2 UNITS: 100 INJECTION, SOLUTION INTRAVENOUS; SUBCUTANEOUS at 13:00

## 2019-01-01 RX ADMIN — INSULIN LISPRO 2 UNITS: 100 INJECTION, SOLUTION INTRAVENOUS; SUBCUTANEOUS at 22:28

## 2019-01-01 RX ADMIN — QUETIAPINE FUMARATE 100 MG: 100 TABLET ORAL at 21:10

## 2019-01-01 RX ADMIN — IPRATROPIUM BROMIDE AND ALBUTEROL SULFATE 3 ML: 2.5; .5 SOLUTION RESPIRATORY (INHALATION) at 23:24

## 2019-01-01 RX ADMIN — HEPARIN SODIUM 5000 UNITS: 5000 INJECTION INTRAVENOUS; SUBCUTANEOUS at 20:36

## 2019-01-01 RX ADMIN — DOXYCYCLINE 100 MG: 100 INJECTION, POWDER, LYOPHILIZED, FOR SOLUTION INTRAVENOUS at 12:02

## 2019-01-01 RX ADMIN — CEFTRIAXONE SODIUM 1 G: 1 INJECTION, SOLUTION INTRAVENOUS at 20:45

## 2019-01-01 RX ADMIN — INSULIN LISPRO 3 UNITS: 100 INJECTION, SOLUTION INTRAVENOUS; SUBCUTANEOUS at 21:32

## 2019-01-01 RX ADMIN — FOLIC ACID 500 MCG: 1 TABLET ORAL at 08:23

## 2019-01-01 RX ADMIN — AZITHROMYCIN MONOHYDRATE 500 MG: 500 INJECTION, POWDER, LYOPHILIZED, FOR SOLUTION INTRAVENOUS at 20:00

## 2019-01-01 RX ADMIN — DOXYCYCLINE 100 MG: 100 INJECTION, POWDER, LYOPHILIZED, FOR SOLUTION INTRAVENOUS at 20:18

## 2019-01-01 RX ADMIN — PANTOPRAZOLE SODIUM 40 MG: 40 TABLET, DELAYED RELEASE ORAL at 18:29

## 2019-01-01 RX ADMIN — ONDANSETRON 4 MG: 2 INJECTION INTRAMUSCULAR; INTRAVENOUS at 08:56

## 2019-01-01 RX ADMIN — HEPARIN SODIUM 5000 UNITS: 5000 INJECTION INTRAVENOUS; SUBCUTANEOUS at 22:29

## 2019-01-01 RX ADMIN — CARVEDILOL 12.5 MG: 12.5 TABLET, FILM COATED ORAL at 17:16

## 2019-01-01 RX ADMIN — DOXYCYCLINE 100 MG: 100 INJECTION, POWDER, LYOPHILIZED, FOR SOLUTION INTRAVENOUS at 23:51

## 2019-01-01 RX ADMIN — LEVOTHYROXINE SODIUM 88 MCG: 88 TABLET ORAL at 05:43

## 2019-01-01 RX ADMIN — QUETIAPINE FUMARATE 100 MG: 100 TABLET ORAL at 22:28

## 2019-01-01 RX ADMIN — PANTOPRAZOLE SODIUM 40 MG: 40 TABLET, DELAYED RELEASE ORAL at 07:56

## 2019-01-01 RX ADMIN — OSELTAMIVIR PHOSPHATE 30 MG: 30 CAPSULE ORAL at 22:28

## 2019-01-01 RX ADMIN — IPRATROPIUM BROMIDE AND ALBUTEROL SULFATE 3 ML: 2.5; .5 SOLUTION RESPIRATORY (INHALATION) at 00:35

## 2019-01-01 RX ADMIN — HYDROCORTISONE ACETATE 25 MG: 25 SUPPOSITORY RECTAL at 08:24

## 2019-01-01 RX ADMIN — Medication 325 MG: at 08:16

## 2019-01-01 RX ADMIN — INSULIN LISPRO 2 UNITS: 100 INJECTION, SOLUTION INTRAVENOUS; SUBCUTANEOUS at 08:25

## 2019-01-01 RX ADMIN — METHYLPREDNISOLONE SODIUM SUCCINATE 40 MG: 40 INJECTION, POWDER, FOR SOLUTION INTRAMUSCULAR; INTRAVENOUS at 08:16

## 2019-01-01 RX ADMIN — HYDROCORTISONE ACETATE 25 MG: 25 SUPPOSITORY RECTAL at 21:12

## 2019-01-01 RX ADMIN — DOXYCYCLINE 100 MG: 100 INJECTION, POWDER, LYOPHILIZED, FOR SOLUTION INTRAVENOUS at 00:06

## 2019-01-01 RX ADMIN — CEFTRIAXONE SODIUM 1 G: 1 INJECTION, SOLUTION INTRAVENOUS at 17:16

## 2019-01-01 RX ADMIN — PANTOPRAZOLE SODIUM 40 MG: 40 TABLET, DELAYED RELEASE ORAL at 08:23

## 2019-01-01 RX ADMIN — DOXYCYCLINE 100 MG: 100 INJECTION, POWDER, LYOPHILIZED, FOR SOLUTION INTRAVENOUS at 08:36

## 2019-01-01 RX ADMIN — CARVEDILOL 12.5 MG: 12.5 TABLET, FILM COATED ORAL at 18:29

## 2019-01-01 RX ADMIN — HYDROCORTISONE ACETATE 25 MG: 25 SUPPOSITORY RECTAL at 08:44

## 2019-01-01 RX ADMIN — INSULIN LISPRO 2 UNITS: 100 INJECTION, SOLUTION INTRAVENOUS; SUBCUTANEOUS at 11:58

## 2019-01-01 RX ADMIN — NIFEDIPINE 90 MG: 60 TABLET, FILM COATED, EXTENDED RELEASE ORAL at 09:01

## 2019-01-01 RX ADMIN — AMLODIPINE BESYLATE 2.5 MG: 2.5 TABLET ORAL at 14:19

## 2019-01-01 RX ADMIN — PANTOPRAZOLE SODIUM 40 MG: 40 TABLET, DELAYED RELEASE ORAL at 06:45

## 2019-01-01 RX ADMIN — IPRATROPIUM BROMIDE AND ALBUTEROL SULFATE 3 ML: 2.5; .5 SOLUTION RESPIRATORY (INHALATION) at 06:42

## 2019-01-01 RX ADMIN — IPRATROPIUM BROMIDE AND ALBUTEROL SULFATE 3 ML: 2.5; .5 SOLUTION RESPIRATORY (INHALATION) at 18:59

## 2019-01-01 RX ADMIN — SUCRALFATE 1 G: 1 TABLET ORAL at 17:04

## 2019-01-01 RX ADMIN — HEPARIN SODIUM 5000 UNITS: 5000 INJECTION INTRAVENOUS; SUBCUTANEOUS at 08:43

## 2019-01-01 RX ADMIN — HEPARIN SODIUM 5000 UNITS: 5000 INJECTION INTRAVENOUS; SUBCUTANEOUS at 21:11

## 2019-01-01 RX ADMIN — PANTOPRAZOLE SODIUM 40 MG: 40 TABLET, DELAYED RELEASE ORAL at 10:54

## 2019-01-01 RX ADMIN — LITHIUM 150 MG: 8 SOLUTION ORAL at 10:22

## 2019-01-01 RX ADMIN — CEFTRIAXONE SODIUM 1 G: 1 INJECTION, SOLUTION INTRAVENOUS at 10:08

## 2019-01-01 RX ADMIN — LEVOTHYROXINE SODIUM 88 MCG: 88 TABLET ORAL at 06:08

## 2019-01-01 RX ADMIN — METHYLPREDNISOLONE SODIUM SUCCINATE 40 MG: 40 INJECTION, POWDER, FOR SOLUTION INTRAMUSCULAR; INTRAVENOUS at 23:33

## 2019-01-01 RX ADMIN — IPRATROPIUM BROMIDE AND ALBUTEROL SULFATE 3 ML: 2.5; .5 SOLUTION RESPIRATORY (INHALATION) at 16:49

## 2019-01-01 RX ADMIN — PREDNISONE 5 MG: 5 TABLET ORAL at 20:18

## 2019-01-01 RX ADMIN — LEVOTHYROXINE SODIUM 88 MCG: 88 TABLET ORAL at 06:09

## 2019-01-01 RX ADMIN — FUROSEMIDE 40 MG: 40 TABLET ORAL at 08:43

## 2019-01-01 RX ADMIN — HYDROCORTISONE ACETATE 25 MG: 25 SUPPOSITORY RECTAL at 08:50

## 2019-01-01 RX ADMIN — LITHIUM 150 MG: 8 SOLUTION ORAL at 08:44

## 2019-01-01 RX ADMIN — CEFTRIAXONE SODIUM 1 G: 1 INJECTION, SOLUTION INTRAVENOUS at 19:12

## 2019-01-01 RX ADMIN — Medication 325 MG: at 20:09

## 2019-01-01 RX ADMIN — QUETIAPINE FUMARATE 100 MG: 100 TABLET ORAL at 20:09

## 2019-01-01 RX ADMIN — FOLIC ACID 500 MCG: 1 TABLET ORAL at 07:57

## 2019-01-01 RX ADMIN — HEPARIN SODIUM 5000 UNITS: 5000 INJECTION INTRAVENOUS; SUBCUTANEOUS at 07:55

## 2019-01-01 RX ADMIN — HYDROCORTISONE ACETATE 25 MG: 25 SUPPOSITORY RECTAL at 22:28

## 2019-01-01 RX ADMIN — DICYCLOMINE HYDROCHLORIDE 10 MG: 10 CAPSULE ORAL at 09:02

## 2019-01-01 RX ADMIN — PANTOPRAZOLE SODIUM 40 MG: 40 TABLET, DELAYED RELEASE ORAL at 05:29

## 2019-01-01 RX ADMIN — HEPARIN SODIUM 5000 UNITS: 5000 INJECTION INTRAVENOUS; SUBCUTANEOUS at 10:54

## 2019-01-01 RX ADMIN — SENNOSIDES AND DOCUSATE SODIUM 2 TABLET: 8.6; 5 TABLET ORAL at 08:44

## 2019-01-01 RX ADMIN — IPRATROPIUM BROMIDE AND ALBUTEROL SULFATE 3 ML: 2.5; .5 SOLUTION RESPIRATORY (INHALATION) at 03:22

## 2019-01-01 RX ADMIN — CARVEDILOL 12.5 MG: 12.5 TABLET, FILM COATED ORAL at 08:35

## 2019-01-01 RX ADMIN — CEFTRIAXONE SODIUM 1 G: 1 INJECTION, SOLUTION INTRAVENOUS at 19:00

## 2019-01-01 RX ADMIN — AMLODIPINE BESYLATE 2.5 MG: 2.5 TABLET ORAL at 08:42

## 2019-01-01 RX ADMIN — AMLODIPINE BESYLATE 2.5 MG: 2.5 TABLET ORAL at 08:23

## 2019-01-01 RX ADMIN — DICYCLOMINE HYDROCHLORIDE 10 MG: 10 CAPSULE ORAL at 09:03

## 2019-01-01 RX ADMIN — LORAZEPAM 0.5 MG: 2 INJECTION INTRAMUSCULAR; INTRAVENOUS at 22:09

## 2019-01-01 RX ADMIN — SENNOSIDES AND DOCUSATE SODIUM 2 TABLET: 8.6; 5 TABLET ORAL at 07:56

## 2019-01-01 RX ADMIN — IPRATROPIUM BROMIDE AND ALBUTEROL SULFATE 3 ML: 2.5; .5 SOLUTION RESPIRATORY (INHALATION) at 19:02

## 2019-01-01 RX ADMIN — HEPARIN SODIUM 5000 UNITS: 5000 INJECTION INTRAVENOUS; SUBCUTANEOUS at 09:03

## 2019-01-18 PROBLEM — J18.9 PNEUMONIA DUE TO INFECTIOUS ORGANISM: Status: ACTIVE | Noted: 2019-01-01

## 2019-01-18 PROBLEM — J10.1 INFLUENZA A: Status: ACTIVE | Noted: 2019-01-01

## 2019-01-18 PROBLEM — J96.21 ACUTE ON CHRONIC RESPIRATORY FAILURE WITH HYPOXIA (HCC): Status: ACTIVE | Noted: 2019-01-01

## 2019-01-18 PROBLEM — I50.9 CHF (CONGESTIVE HEART FAILURE) (HCC): Status: ACTIVE | Noted: 2019-01-01

## 2019-01-19 PROBLEM — N18.30 CKD (CHRONIC KIDNEY DISEASE) STAGE 3, GFR 30-59 ML/MIN (HCC): Status: ACTIVE | Noted: 2019-01-01

## 2019-01-20 PROBLEM — E61.1 IRON DEFICIENCY: Status: ACTIVE | Noted: 2017-03-22

## 2019-01-20 PROBLEM — R19.5 OCCULT GI BLEEDING: Status: ACTIVE | Noted: 2019-01-01

## 2019-01-20 PROBLEM — I27.20 PULMONARY HYPERTENSION, MODERATE TO SEVERE (HCC): Status: ACTIVE | Noted: 2019-01-01

## 2019-01-20 PROBLEM — E53.8 FOLIC ACID DEFICIENCY: Status: ACTIVE | Noted: 2019-01-01

## 2019-01-20 PROBLEM — E53.8 FOLIC ACID DEFICIENCY: Status: RESOLVED | Noted: 2019-01-01 | Resolved: 2019-01-01

## 2019-01-27 NOTE — OUTREACH NOTE
Prep Survey      Responses   Facility patient discharged from?  [place]   Is patient eligible?  No   What are the reasons patient is not eligible?  [facility]   Does the patient have one of the following disease processes/diagnoses(primary or secondary)?  Other [had pneumonia]   Prep survey completed?  Yes          Radha Parks, RN

## 2019-02-10 PROBLEM — E87.1 HYPONATREMIA: Status: ACTIVE | Noted: 2019-01-01

## 2019-02-10 PROBLEM — N17.9 ACUTE RENAL FAILURE SUPERIMPOSED ON CHRONIC KIDNEY DISEASE (HCC): Status: ACTIVE | Noted: 2019-01-01

## 2019-02-10 PROBLEM — R47.1 DYSARTHRIA: Status: ACTIVE | Noted: 2019-01-01

## 2019-02-10 PROBLEM — J90 PLEURAL EFFUSION ON RIGHT: Status: ACTIVE | Noted: 2019-01-01

## 2019-02-10 PROBLEM — N18.9 ACUTE RENAL FAILURE SUPERIMPOSED ON CHRONIC KIDNEY DISEASE (HCC): Status: ACTIVE | Noted: 2019-01-01

## 2019-02-10 PROBLEM — R55 SYNCOPE: Status: ACTIVE | Noted: 2019-01-01

## 2019-02-10 PROBLEM — R53.1 GENERALIZED WEAKNESS: Status: ACTIVE | Noted: 2019-01-01

## 2019-02-10 PROBLEM — D64.9 ANEMIA: Status: ACTIVE | Noted: 2019-01-01

## 2019-02-10 NOTE — ED PROVIDER NOTES
Subjective   Erma West is a 89 y.o. female who presents to the emergency department with complaints of possible seizure like activity that occurred today. Daughter mentions that the patient was on the toilet today at [facility], [place] when she felt weak and fell over on the wall. The patient had rapid eye movement and was unresponsive. Daughter states that the patient was sitting up and feeding herself before the weakness episode. She felt weak for the last two nights and had urinary incontinence last night. The patient had diarrhea 3 days ago but had imodium and the diarrhea subsided. The patient can normally carry on a conversation and has word salad occasionally per daughter. She has a skin tear that occurred this morning when she was moving to her wheelchair. The patient has a PMHx of dementia, CHF, and possibly chronic kidney disease with her last creatine being 2.22 two days ago. She had a 2 valve replacements in 2007. The patient denies any abdominal pain, fever, and any other acute symptoms at this time.         History provided by:  Nursing home  History limited by:  Dementia  Seizures   Seizure type:  Unable to specify  Initial focality:  None  Episode characteristics: eye deviation and unresponsiveness    Severity:  Moderate  Timing:  Once  Progression:  Unchanged      Review of Systems   Unable to perform ROS: Mental status change   Constitutional: Negative for appetite change and fever.   Respiratory: Negative for cough.    Gastrointestinal: Positive for diarrhea. Negative for abdominal pain.   Skin: Positive for wound.   Neurological: Positive for seizures and weakness.        Positive for urinary incontinence.        Past Medical History:   Diagnosis Date   • Anemia    • Anxiety    • Arthritis    • Arthritis    • Depression    • Diabetes mellitus (CMS/HCC)    • Stroke (CMS/HCC)     x4 over a span of about 30 years   • Thyroid disorder    • Ulcer        Allergies   Allergen Reactions   • Penicillins Hives    • Percodan [Oxycodone-Aspirin]      Headaces       Past Surgical History:   Procedure Laterality Date   • APPENDECTOMY     • ARTERIAL BYPASS SURGERY  2007   • BACK SURGERY     • CARDIAC CATHETERIZATION  2007   • CATARACT EXTRACTION Bilateral    •  SECTION      x4    • CHOLECYSTECTOMY     • COLONOSCOPY  2016   • CORONARY ANGIOPLASTY WITH STENT PLACEMENT     • HEMORRHOIDECTOMY      x2   • HYSTERECTOMY     • RESECTION RIBS EXTRAPLEURAL         Family History   Problem Relation Age of Onset   • Diabetes Mother    • Heart disease Mother    • Diabetes Father    • Diabetes Sister    • Heart attack Brother    • Heart disease Sister    • Heart disease Sister    • Heart failure Sister        Social History     Socioeconomic History   • Marital status:      Spouse name: Not on file   • Number of children: Not on file   • Years of education: Not on file   • Highest education level: Not on file   Tobacco Use   • Smoking status: Never Smoker   • Smokeless tobacco: Never Used   Substance and Sexual Activity   • Alcohol use: No   • Drug use: No   • Sexual activity: Defer         Objective   Physical Exam   Constitutional: She is oriented to person, place, and time. She appears well-developed and well-nourished. No distress.   HENT:   Head: Normocephalic and atraumatic.   Nose: Nose normal.   Mouth/Throat: Oropharynx is clear and moist.   The patient has no knots or bumps on her head.   Eyes: Conjunctivae and EOM are normal. Pupils are equal, round, and reactive to light. No scleral icterus.   Neck: Normal range of motion. Neck supple.   Cardiovascular: Normal heart sounds and intact distal pulses.   No murmur heard.  The patient has an occasional irregular beat. Bradycardic in the 50s.  The patient has an old median sternotomy scar.   Pulmonary/Chest: Effort normal and breath sounds normal. No respiratory distress. She has no wheezes. She has no rales.   Abdominal: Soft. Bowel sounds are normal. There is no  tenderness. There is no guarding.   Musculoskeletal: Normal range of motion.   Neurological: She is alert and oriented to person, place, and time.   The patient has word salad speech. At times she is incomprehensible.  She has generalized weakness but moves all extremities appropriately. The patient has no facial droop.    Skin: Skin is warm and dry. She is not diaphoretic.   Psychiatric: She has a normal mood and affect. Her behavior is normal.   Nursing note and vitals reviewed.      Procedures         ED Course     The pt's daughter is at bedside and states pt has been weaker than than usual in past few days.  Pt's mentation is off per daughter.  She normally can converse and make sense.  Today, her speech is largely word salad.  Her Creatinine is 2.3 but this appears chronic.  She is chronically anemic with H&H of 8.7/28.6.    UA is normal.  CT head shows age related atrophy but no evidence of stroke.  Glucose is 154.  Chest xray shows cardiomegally with mild increased vascularity and small right pleural effusion.   Will plan to do MRI brain and admit.  Recent Results (from the past 24 hour(s))   POC Troponin, Rapid    Collection Time: 02/10/19  1:40 PM   Result Value Ref Range    Troponin I 0.01 0.00 - 0.07 ng/mL   Comprehensive Metabolic Panel    Collection Time: 02/10/19  1:43 PM   Result Value Ref Range    Glucose 142 (H) 70 - 100 mg/dL    BUN 69 (H) 9 - 23 mg/dL    Creatinine 2.31 (H) 0.60 - 1.30 mg/dL    Sodium 131 (L) 132 - 146 mmol/L    Potassium 4.3 3.5 - 5.5 mmol/L    Chloride 98 (L) 99 - 109 mmol/L    CO2 31.0 20.0 - 31.0 mmol/L    Calcium 8.7 8.7 - 10.4 mg/dL    Total Protein 6.3 5.7 - 8.2 g/dL    Albumin 3.93 3.20 - 4.80 g/dL    ALT (SGPT) 8 7 - 40 U/L    AST (SGOT) 8 0 - 33 U/L    Alkaline Phosphatase 63 25 - 100 U/L    Total Bilirubin 0.5 0.3 - 1.2 mg/dL    eGFR Non African Amer 20 (L) >60 mL/min/1.73    Globulin 2.4 gm/dL    A/G Ratio 1.7 1.5 - 2.5 g/dL    BUN/Creatinine Ratio 29.9 (H) 7.0 - 25.0     Anion Gap 2.0 (L) 3.0 - 11.0 mmol/L   Magnesium    Collection Time: 02/10/19  1:43 PM   Result Value Ref Range    Magnesium 2.6 1.3 - 2.7 mg/dL   Light Blue Top    Collection Time: 02/10/19  1:43 PM   Result Value Ref Range    Extra Tube hold for add-on    Green Top (Gel)    Collection Time: 02/10/19  1:43 PM   Result Value Ref Range    Extra Tube Hold for add-ons.    Lavender Top    Collection Time: 02/10/19  1:43 PM   Result Value Ref Range    Extra Tube hold for add-on    Gold Top - SST    Collection Time: 02/10/19  1:43 PM   Result Value Ref Range    Extra Tube Hold for add-ons.    CBC Auto Differential    Collection Time: 02/10/19  1:43 PM   Result Value Ref Range    WBC 6.61 3.50 - 10.80 10*3/mm3    RBC 2.90 (L) 3.89 - 5.14 10*6/mm3    Hemoglobin 8.7 (L) 11.5 - 15.5 g/dL    Hematocrit 28.6 (L) 34.5 - 44.0 %    MCV 98.6 80.0 - 99.0 fL    MCH 30.0 27.0 - 31.0 pg    MCHC 30.4 (L) 32.0 - 36.0 g/dL    RDW 15.6 (H) 11.3 - 14.5 %    RDW-SD 57.0 (H) 37.0 - 54.0 fl    MPV 11.6 6.0 - 12.0 fL    Platelets 178 150 - 450 10*3/mm3    Neutrophil % 81.7 (H) 41.0 - 71.0 %    Lymphocyte % 11.5 (L) 24.0 - 44.0 %    Monocyte % 5.0 0.0 - 12.0 %    Eosinophil % 1.5 0.0 - 3.0 %    Basophil % 0.3 0.0 - 1.0 %    Immature Grans % 0.2 0.0 - 0.6 %    Neutrophils, Absolute 5.40 1.50 - 8.30 10*3/mm3    Lymphocytes, Absolute 0.76 0.60 - 4.80 10*3/mm3    Monocytes, Absolute 0.33 0.00 - 1.00 10*3/mm3    Eosinophils, Absolute 0.10 0.00 - 0.30 10*3/mm3    Basophils, Absolute 0.02 0.00 - 0.20 10*3/mm3    Immature Grans, Absolute 0.01 0.00 - 0.03 10*3/mm3   POC Glucose Once    Collection Time: 02/10/19  1:50 PM   Result Value Ref Range    Glucose 154 (H) 70 - 130 mg/dL   Urinalysis With Culture If Indicated - Urine, Catheter    Collection Time: 02/10/19  2:21 PM   Result Value Ref Range    Color, UA Yellow Yellow, Straw    Appearance, UA Clear Clear    pH, UA <=5.0 5.0 - 8.0    Specific Gravity, UA 1.014 1.001 - 1.030    Glucose, UA Negative  Negative    Ketones, UA Negative Negative    Bilirubin, UA Negative Negative    Blood, UA Negative Negative    Protein, UA Negative Negative    Leuk Esterase, UA Negative Negative    Nitrite, UA Negative Negative    Urobilinogen, UA 0.2 E.U./dL 0.2 - 1.0 E.U./dL     Note: In addition to lab results from this visit, the labs listed above may include labs taken at another facility or during a different encounter within the last 24 hours. Please correlate lab times with ED admission and discharge times for further clarification of the services performed during this visit.    XR Chest 1 View   Preliminary Result   Heart is enlarged with increased markings seen of  the lung   bases bilaterally with small right pleural effusion. Increased pulmonary   vascularity within the lung fields bilaterally.       DICTATED:   2/10/2019   EDITED/ls :   2/10/2019           CT Head Without Contrast   Preliminary Result   Paranasal sinusitis with mild atrophy and chronic changes   seen of the brain and no acute intracranial abnormality identified.       DICTATED:   2/10/2019   EDITED/ls :   2/10/2019               MRI Brain Without Contrast    (Results Pending)     Vitals:    02/10/19 1330 02/10/19 1352 02/10/19 1355 02/10/19 1424   BP: 138/59 129/57 121/55    BP Location:       Patient Position:  Lying Sitting    Pulse: 55 (!) 49 66    Resp:       Temp:       TempSrc:       SpO2: 90%   93%   Weight:       Height:         Medications   sodium chloride 0.9 % flush 10 mL (not administered)     ECG/EMG Results (last 24 hours)     Procedure Component Value Units Date/Time    ECG 12 Lead [[id number]] Collected:  02/10/19 1320     Updated:  02/10/19 1320        ECG 12 Lead               Recent Results (from the past 24 hour(s))   POC Troponin, Rapid    Collection Time: 02/10/19  1:40 PM   Result Value Ref Range    Troponin I 0.01 0.00 - 0.07 ng/mL   Comprehensive Metabolic Panel    Collection Time: 02/10/19  1:43 PM   Result Value Ref Range     Glucose 142 (H) 70 - 100 mg/dL    BUN 69 (H) 9 - 23 mg/dL    Creatinine 2.31 (H) 0.60 - 1.30 mg/dL    Sodium 131 (L) 132 - 146 mmol/L    Potassium 4.3 3.5 - 5.5 mmol/L    Chloride 98 (L) 99 - 109 mmol/L    CO2 31.0 20.0 - 31.0 mmol/L    Calcium 8.7 8.7 - 10.4 mg/dL    Total Protein 6.3 5.7 - 8.2 g/dL    Albumin 3.93 3.20 - 4.80 g/dL    ALT (SGPT) 8 7 - 40 U/L    AST (SGOT) 8 0 - 33 U/L    Alkaline Phosphatase 63 25 - 100 U/L    Total Bilirubin 0.5 0.3 - 1.2 mg/dL    eGFR Non African Amer 20 (L) >60 mL/min/1.73    Globulin 2.4 gm/dL    A/G Ratio 1.7 1.5 - 2.5 g/dL    BUN/Creatinine Ratio 29.9 (H) 7.0 - 25.0    Anion Gap 2.0 (L) 3.0 - 11.0 mmol/L   Magnesium    Collection Time: 02/10/19  1:43 PM   Result Value Ref Range    Magnesium 2.6 1.3 - 2.7 mg/dL   Light Blue Top    Collection Time: 02/10/19  1:43 PM   Result Value Ref Range    Extra Tube hold for add-on    Green Top (Gel)    Collection Time: 02/10/19  1:43 PM   Result Value Ref Range    Extra Tube Hold for add-ons.    Lavender Top    Collection Time: 02/10/19  1:43 PM   Result Value Ref Range    Extra Tube hold for add-on    Gold Top - SST    Collection Time: 02/10/19  1:43 PM   Result Value Ref Range    Extra Tube Hold for add-ons.    CBC Auto Differential    Collection Time: 02/10/19  1:43 PM   Result Value Ref Range    WBC 6.61 3.50 - 10.80 10*3/mm3    RBC 2.90 (L) 3.89 - 5.14 10*6/mm3    Hemoglobin 8.7 (L) 11.5 - 15.5 g/dL    Hematocrit 28.6 (L) 34.5 - 44.0 %    MCV 98.6 80.0 - 99.0 fL    MCH 30.0 27.0 - 31.0 pg    MCHC 30.4 (L) 32.0 - 36.0 g/dL    RDW 15.6 (H) 11.3 - 14.5 %    RDW-SD 57.0 (H) 37.0 - 54.0 fl    MPV 11.6 6.0 - 12.0 fL    Platelets 178 150 - 450 10*3/mm3    Neutrophil % 81.7 (H) 41.0 - 71.0 %    Lymphocyte % 11.5 (L) 24.0 - 44.0 %    Monocyte % 5.0 0.0 - 12.0 %    Eosinophil % 1.5 0.0 - 3.0 %    Basophil % 0.3 0.0 - 1.0 %    Immature Grans % 0.2 0.0 - 0.6 %    Neutrophils, Absolute 5.40 1.50 - 8.30 10*3/mm3    Lymphocytes, Absolute 0.76 0.60  - 4.80 10*3/mm3    Monocytes, Absolute 0.33 0.00 - 1.00 10*3/mm3    Eosinophils, Absolute 0.10 0.00 - 0.30 10*3/mm3    Basophils, Absolute 0.02 0.00 - 0.20 10*3/mm3    Immature Grans, Absolute 0.01 0.00 - 0.03 10*3/mm3   POC Glucose Once    Collection Time: 02/10/19  1:50 PM   Result Value Ref Range    Glucose 154 (H) 70 - 130 mg/dL   Urinalysis With Culture If Indicated - Urine, Catheter    Collection Time: 02/10/19  2:21 PM   Result Value Ref Range    Color, UA Yellow Yellow, Straw    Appearance, UA Clear Clear    pH, UA <=5.0 5.0 - 8.0    Specific Gravity, UA 1.014 1.001 - 1.030    Glucose, UA Negative Negative    Ketones, UA Negative Negative    Bilirubin, UA Negative Negative    Blood, UA Negative Negative    Protein, UA Negative Negative    Leuk Esterase, UA Negative Negative    Nitrite, UA Negative Negative    Urobilinogen, UA 0.2 E.U./dL 0.2 - 1.0 E.U./dL     Note: In addition to lab results from this visit, the labs listed above may include labs taken at another facility or during a different encounter within the last 24 hours. Please correlate lab times with ED admission and discharge times for further clarification of the services performed during this visit.    XR Chest 1 View   Preliminary Result   Heart is enlarged with increased markings seen of  the lung   bases bilaterally with small right pleural effusion. Increased pulmonary   vascularity within the lung fields bilaterally.       DICTATED:   2/10/2019   EDITED/ls :   2/10/2019           CT Head Without Contrast   Preliminary Result   Paranasal sinusitis with mild atrophy and chronic changes   seen of the brain and no acute intracranial abnormality identified.       DICTATED:   2/10/2019   EDITED/ls :   2/10/2019               MRI Brain Without Contrast    (Results Pending)     Vitals:    02/10/19 1330 02/10/19 1352 02/10/19 1355 02/10/19 1424   BP: 138/59 129/57 121/55    BP Location:       Patient Position:  Lying Sitting    Pulse: 55 (!) 49 66     Resp:       Temp:       TempSrc:       SpO2: 90%   93%   Weight:       Height:         Medications   sodium chloride 0.9 % flush 10 mL (not administered)     ECG/EMG Results (last 24 hours)     Procedure Component Value Units Date/Time    ECG 12 Lead [[id number]] Collected:  02/10/19 1320     Updated:  02/10/19 1320        ECG 12 Lead                           MDM    Final diagnoses:   Altered mental status, unspecified altered mental status type   Dysarthria   Syncope and collapse       Documentation assistance provided by juan f Barron.  Information recorded by the scribe was done at my direction and has been verified and validated by me.     Zeinab Barron  02/10/19 1411       Indio Serrato, PA  02/10/19 1607

## 2019-02-11 PROBLEM — G93.41 ACUTE METABOLIC ENCEPHALOPATHY: Status: ACTIVE | Noted: 2019-01-01

## 2019-02-11 PROBLEM — R41.82 ALTERED MENTAL STATUS: Status: ACTIVE | Noted: 2019-01-01

## 2019-02-11 PROBLEM — E11.649 TYPE 2 DIABETES MELLITUS WITH HYPOGLYCEMIA (HCC): Status: ACTIVE | Noted: 2018-01-01

## 2019-02-11 NOTE — THERAPY EVALUATION
Acute Care - Occupational Therapy Initial Evaluation  [facility]     Patient Name: Erma West  : 1929  MRN: [id number]  Today's Date: 2019  Onset of Illness/Injury or Date of Surgery: 02/10/19  Date of Referral to OT: 19  Referring Physician: MD Harinder     Admit Date: 2/10/2019       ICD-10-CM ICD-9-CM   1. Altered mental status, unspecified altered mental status type R41.82 780.97   2. Dysarthria R47.1 784.51   3. Syncope and collapse R55 780.2   4. Impaired mobility and ADLs Z74.09 799.89     Patient Active Problem List   Diagnosis   • Iron deficiency   • Chest pain   • Multifocal pneumonia   • Leukocytosis   • Anxiety   • Type 2 diabetes mellitus with hypoglycemia (CMS/HCC)   • CAD (coronary artery disease)   • Valvular heart disease   • History of CVA (cerebrovascular accident)   • Hypothyroidism   • Pneumonia of both lungs due to infectious organism   • Multifocal pneumonia   • Acute on chronic congestive heart failure (CMS/HCC)   • Influenza A   • Acute on chronic respiratory failure with hypoxia (CMS/HCC)   • Pneumonia due to infectious organism   • CKD (chronic kidney disease) stage 3, GFR 30-59 ml/min (CMS/HCC)   • Occult GI bleeding   • Pulmonary hypertension, moderate to severe (CMS/HCC)   • Acute renal failure superimposed on chronic kidney disease (CMS/HCC)   • Syncope   • Pleural effusion on right   • Hyponatremia   • Anemia   • Generalized weakness   • Dysarthria   • Acute metabolic encephalopathy     Past Medical History:   Diagnosis Date   • Anemia    • Anxiety    • Arthritis    • Arthritis    • Depression    • Diabetes mellitus (CMS/HCC)    • Stroke (CMS/HCC)     x4 over a span of about 30 years   • Thyroid disorder    • Ulcer      Past Surgical History:   Procedure Laterality Date   • APPENDECTOMY     • ARTERIAL BYPASS SURGERY     • BACK SURGERY     • CARDIAC CATHETERIZATION     • CATARACT EXTRACTION Bilateral    •  SECTION      x4    • CHOLECYSTECTOMY      • COLONOSCOPY  2016   • CORONARY ANGIOPLASTY WITH STENT PLACEMENT     • HEMORRHOIDECTOMY      x2   • HYSTERECTOMY     • RESECTION RIBS EXTRAPLEURAL            OT ASSESSMENT FLOWSHEET (last 72 hours)      Occupational Therapy Evaluation     Row Name 02/11/19 1300                   OT Evaluation Time/Intention    Subjective Information  no complaints  -HK        Document Type  evaluation  -HK        Mode of Treatment  individual therapy;occupational therapy  -HK        Patient Effort  good  -HK           General Information    Patient Profile Reviewed?  yes  -HK        Onset of Illness/Injury or Date of Surgery  02/10/19  -HK        Referring Physician  MD Harinder   -HK        Patient Observations  alert;cooperative;agree to therapy  -HK        Patient/Family Observations  No family at bedside.   -HK        General Observations of Patient  Pt received on BSC with IV heplocked.   -HK        Prior Level of Function  mod assist:;all household mobility;ADL's per chart; Pt poor historian  -HK        Equipment Currently Used at Home  walker, rolling;oxygen  -HK        Pertinent History of Current Functional Problem  Pt is a 89 YOF who presents from [facility] after eating, feeling weak on toilet and had syncopal episode. Pt unresponsive for unknown amount of time and sent to ED. Pt complains of cough, SOA, and nausea. Xray indicates small R pleural effusion , CT and MRI of head (-). Daughter reports AMS. Recent admit in January due to flu; has been at [facility] since.   -HK        Existing Precautions/Restrictions  fall;oxygen therapy device and L/min  -HK        Risks Reviewed  patient:;LOB;nausea/vomiting;dizziness;increased discomfort;change in vital signs;increased drainage;lines disloged  -HK        Benefits Reviewed  patient:;improve function;increase independence;increase strength;increase balance;decrease pain;decrease risk of DVT;improve skin integrity;increase knowledge  -HK        Barriers to Rehab  medically  complex;previous functional deficit;cognitive status  -HK           Relationship/Environment    Primary Source of Support/Comfort  child(brayden)  -HK        Lives With  spouse  -HK        Name(s) of Who Lives With Patient  Transfer from [facility]   -HK           Resource/Environmental Concerns    Current Living Arrangements  home/apartment/condo  -HK           Home Main Entrance    Number of Stairs, Main Entrance  four  -HK        Stair Railings, Main Entrance  railing on right side (ascending)  -HK        Stairs Comment, Main Entrance  per chart; Pt poor historian and no family present   -HK           Cognitive Assessment/Interventions    Additional Documentation  Cognitive Assessment/Intervention (Group)  -HK           Cognitive Assessment/Intervention- PT/OT    Affect/Mental Status (Cognitive)  confused  -HK        Orientation Status (Cognition)  oriented to;person;disoriented to;place;situation;time  -HK        Follows Commands (Cognition)  follows one step commands;75-90% accuracy  -HK        Cognitive Function (Cognitive)  safety deficit;executive function deficit;memory deficit  -HK        Executive Function Deficit (Cognition)  moderate deficit;information processing;initiation  -HK        Memory Deficit (Cognitive)  short term memory;moderate deficit  -HK        Safety Deficit (Cognitive)  moderate deficit;safety precautions awareness;safety precautions follow-through/compliance;insight into deficits/self awareness;awareness of need for assistance;at risk behavior observed;ability to follow commands  -HK        Personal Safety Interventions  fall prevention program maintained;gait belt;nonskid shoes/slippers when out of bed  -HK           Safety Issues, Functional Mobility    Safety Issues Affecting Function (Mobility)  safety precautions follow-through/compliance;safety precaution awareness;sequencing abilities;insight into deficits/self awareness;awareness of need for assistance  -HK        Impairments  Affecting Function (Mobility)  balance;strength;cognition;endurance/activity tolerance  -HK           Bed Mobility Assessment/Treatment    Comment (Bed Mobility)  Pt received on BSC and left UIC.   -HK           Functional Mobility    Functional Mobility- Ind. Level  minimum assist (75% patient effort);verbal cues required  -HK        Functional Mobility- Device  rolling walker  -HK        Functional Mobility-Distance (Feet)  2  -HK        Functional Mobility- Safety Issues  step length decreased;weight-shifting ability decreased  -HK        Functional Mobility- Comment  steps from BSC to chair with James and use of RW.   -HK           Transfer Assessment/Treatment    Transfer Assessment/Treatment  toilet transfer;stand-sit transfer  -HK           Stand-Sit Transfer    Stand-Sit [place] (Transfers)  minimum assist (75% patient effort);verbal cues  -HK        Assistive Device (Stand-Sit Transfers)  walker, front-wheeled  -HK           Toilet Transfer    Type (Toilet Transfer)  sit-stand  -HK        [place] Level (Toilet Transfer)  minimum assist (75% patient effort);verbal cues  -HK        Assistive Device (Toilet Transfer)  walker, front-wheeled  -HK           ADL Assessment/Intervention    BADL Assessment/Intervention  lower body dressing;toileting;grooming  -HK           Lower Body Dressing Assessment/Training    Comment (Lower Body Dressing)  Pt attepted to dof socks but unable to complete.   -HK           Grooming Assessment/Training    [place] Level (Grooming)  maximum assist (25% patient effort);oral care regimen;supervision;hair care, combing/brushing  -HK        Grooming Position  unsupported sitting  -HK        Comment (Grooming)  Pt combed hair with supervision and maxA to complete oral care.  -HK           Toileting Assessment/Training    [place] Level (Toileting)  toileting skills;maximum assist (25% patient effort)  -HK        Assistive Devices (Toileting)  commode, bedside without  drop arms  -HK        Toileting Position  unsupported sitting  -HK        Comment (Toileting)  Pt requires maxA for chika care.   -HK           BADL Safety/Performance    Impairments, BADL Safety/Performance  balance;strength;endurance/activity tolerance  -HK           General ROM    RT Upper Ext  Rt Shoulder Flexion  -HK        LT Upper Ext  Lt Shoulder Flexion  -HK           Right Upper Ext    Rt Shoulder Flexion AROM  grossly 120 degrees AROM   -HK           Left Upper Ext    Lt Shoulder Flexion AROM  grossly 120 degrees AROM  -HK           MMT (Manual Muscle Testing)    Rt Upper Ext  Rt Shoulder Flexion  -HK        Lt Upper Ext  Lt Shoulder Flexion  -HK           MMT Right Upper Ext    Rt Shoulder Flexion MMT, Gross Movement  (3+/5) fair plus  -HK           MMT Left Upper Ext    Lt Shoulder Flexion MMT, Gross Movement  (3+/5) fair plus  -HK           Vision Assessment/Intervention    Vision Assessment Comment  per chart pt has hx of macular degeneration. Pt with difficulty seeing tooth brush.   -HK           Positioning and Restraints    Pre-Treatment Position  bedside commode  -HK        Post Treatment Position  chair  -HK        In Chair  notified nsg;reclined;call light within reach;encouraged to call for assist;exit alarm on;waffle cushion  -HK           Pain Assessment    Additional Documentation  Pain Scale: Numbers Pre/Post-Treatment (Group)  -HK           Pain Scale: Numbers Pre/Post-Treatment    Pain Scale: Numbers, Pretreatment  0/10 - no pain  -HK        Pain Scale: Numbers, Post-Treatment  0/10 - no pain  -HK           Wound 02/10/19 1900 Right anterior;distal;lower leg skin tear    Wound - Properties Group Date first assessed: 02/10/19  -CB Time first assessed: 1900  -CB Present On Admission : yes  -CB Side: Right  -CB Orientation: anterior;distal;lower  -CB Location: leg  -CB Type: skin tear  -CB       Coping    Observed Emotional State  accepting;calm  -HK           Plan of Care Review    Plan of  Care Reviewed With  patient  -HK           Clinical Impression (OT)    Date of Referral to OT  02/11/19  -HK        OT Diagnosis  Decreased independence with ADLS and Mobility   -HK        Patient/Family Goals Statement (OT Eval)  Pt would like to improve and return home.   -HK        Criteria for Skilled Therapeutic Interventions Met (OT Eval)  yes;treatment indicated  -HK        Rehab Potential (OT Eval)  good, to achieve stated therapy goals  -HK        Therapy Frequency (OT Eval)  daily  -HK        Care Plan Review (OT)  evaluation/treatment results reviewed;care plan/treatment goals reviewed;risks/benefits reviewed;patient/other agree to care plan  -HK        Anticipated Discharge Disposition (OT)  skilled nursing facility Rehab  -HK           Vital Signs    Pre Systolic BP Rehab  -- RN cleared for tx  -HK        Pre Patient Position  Sitting  -HK        Intra Patient Position  Standing  -HK        Post Patient Position  Sitting  -HK           OT Goals    Bed Mobility Goal Selection (OT)  bed mobility, OT goal 1  -HK        Transfer Goal Selection (OT)  transfer, OT goal 1  -HK        Toileting Goal Selection (OT)  toileting, OT goal 1  -HK        ROM Goal Selection (OT)  --  -HK        Strength Goal Selection (OT)  strength, OT goal 1  -HK        Additional Documentation  Strength Goal Selection (OT) (Row)  -HK           Bed Mobility Goal 1 (OT)    Activity/Assistive Device (Bed Mobility Goal 1, OT)  sit to supine/supine to sit;scooting  -HK        [place] Level/Cues Needed (Bed Mobility Goal 1, OT)  minimum assist (75% or more patient effort);verbal cues required  -HK        Time Frame (Bed Mobility Goal 1, OT)  5 days  -HK        Progress/Outcomes (Bed Mobility Goal 1, OT)  goal ongoing  -HK           Transfer Goal 1 (OT)    Activity/Assistive Device (Transfer Goal 1, OT)  sit-to-stand/stand-to-sit;toilet  -HK        [place] Level/Cues Needed (Transfer Goal 1, OT)  contact guard assist;verbal cues  required  -HK        Time Frame (Transfer Goal 1, OT)  5 days  -HK        Progress/Outcome (Transfer Goal 1, OT)  goal ongoing  -HK           Toileting Goal 1 (OT)    Activity/Device (Toileting Goal 1, OT)  toileting skills, all  -HK        [place] Level/Cues Needed (Toileting Goal 1, OT)  moderate assist (50-74% patient effort);verbal cues required  -HK        Time Frame (Toileting Goal 1, OT)  5 days  -HK        Progress/Outcome (Toileting Goal 1, OT)  goal ongoing  -HK           Strength Goal 1 (OT)    Strength Goal 1 (OT)  Pt will complete BUE AROM x15 reps each session to increase strength and activity tolerance for completion of ADLS.   -HK        Time Frame (Strength Goal 1, OT)  5 days  -HK        Progress/Outcome (Strength Goal 1, OT)  goal ongoing  -HK           Living Environment    Home Accessibility  stairs to enter home  -HK          User Key  (r) = Recorded By, (t) = Taken By, (c) = Cosigned By    Initials Name Effective Dates    CB Samantha Franklin RN 06/16/16 -     Cristina Maldonado OT 03/07/18 -          Occupational Therapy Education     Title: PT OT SLP Therapies (In Progress)     Topic: Occupational Therapy (In Progress)     Point: ADL training (Done)     Description: Instruct learner(s) on proper safety adaptation and remediation techniques during self care or transfers.   Instruct in proper use of assistive devices.    Learning Progress Summary           Patient Acceptance, E, VU by  at 2/11/2019  2:49 PM    Comment:  Pt educated on ADL retraining with toileting and grooming, safety precautions, and appropriate body mechanics.                   Point: Precautions (Done)     Description: Instruct learner(s) on prescribed precautions during self-care and functional transfers.    Learning Progress Summary           Patient Acceptance, E, VU by  at 2/11/2019  2:49 PM    Comment:  Pt educated on ADL retraining with toileting and grooming, safety precautions, and appropriate body mechanics.                    Point: Body mechanics (Done)     Description: Instruct learner(s) on proper positioning and spine alignment during self-care, functional mobility activities and/or exercises.    Learning Progress Summary           Patient Alia, MANE VU by  at 2/11/2019  2:49 PM    Comment:  Pt educated on ADL retraining with toileting and grooming, safety precautions, and appropriate body mechanics.                               User Key     Initials Effective Dates Name Provider Type Discipline     03/07/18 -  Cristina Braun, OT Occupational Therapist OT                  OT Recommendation and Plan  Outcome Summary/Treatment Plan (OT)  Anticipated Discharge Disposition (OT): skilled nursing facility(Rehab)  Therapy Frequency (OT Eval): daily  Plan of Care Review  Plan of Care Reviewed With: patient  Plan of Care Reviewed With: patient  Outcome Summary: OT eval complete. Pt demonstrates decreased strength, balance, and activity tolerance. Pt is limited by confusion and decreased vision. Pt completes sit to stand from BSC with James and took steps to chair with James and RW. Pt required maxA for chika care and oral care. Pt combed hair with supervision. Recommend d/c back to SNF rehab when medically ready.     Outcome Measures     Row Name 02/11/19 1300 02/11/19 0840          How much help from another person do you currently need...    Turning from your back to your side while in flat bed without using bedrails?  --  2  -EJ     Moving from lying on back to sitting on the side of a flat bed without bedrails?  --  2  -EJ     Moving to and from a bed to a chair (including a wheelchair)?  --  2  -EJ     Standing up from a chair using your arms (e.g., wheelchair, bedside chair)?  --  2  -EJ     Climbing 3-5 steps with a railing?  --  1  -EJ     To walk in hospital room?  --  1  -EJ     AM-PAC 6 Clicks Score  --  10  -EJ        How much help from another is currently needed...    Putting on and taking off regular lower  body clothing?  2  -HK  --     Bathing (including washing, rinsing, and drying)  2  -HK  --     Toileting (which includes using toilet bed pan or urinal)  2  -HK  --     Putting on and taking off regular upper body clothing  2  -HK  --     Taking care of personal grooming (such as brushing teeth)  2  -HK  --     Eating meals  3  -HK  --     Score  13  -HK  --        Functional Assessment    Outcome Measure Options  AM-PAC 6 Clicks Daily Activity (OT)  -  AM-PAC 6 Clicks Basic Mobility (PT)  -       User Key  (r) = Recorded By, (t) = Taken By, (c) = Cosigned By    Initials Name Provider Type    EJ Lorena Fernandez, PT Physical Therapist    Cristina Maldonado OT Occupational Therapist          Time Calculation:   Time Calculation- OT     Row Name 02/11/19 1300             Time Calculation- OT    OT Start Time  1300  -      OT Received On  02/11/19  -      OT Goal Re-Cert Due Date  02/21/19  -        User Key  (r) = Recorded By, (t) = Taken By, (c) = Cosigned By    Initials Name Provider Type    Cristina Maldonado, OT Occupational Therapist        Therapy Suggested Charges     Code   Minutes Charges    None           Therapy Charges for Today     Code Description Service Date Service Provider Modifiers Qty    [id number]  OT EVAL MOD COMPLEXITY 4 2/11/2019 Cristina Braun OT GO 1               Cristina Braun OT  2/11/2019

## 2019-02-11 NOTE — PLAN OF CARE
Problem: Patient Care Overview  Goal: Plan of Care Review  Outcome: Ongoing (interventions implemented as appropriate)   02/11/19 8490   OTHER   Outcome Summary OT eval complete. Pt demonstrates decreased strength, balance, and activity tolerance. Pt is limited by confusion and decreased vision. Pt completes sit to stand from BSC with James and took steps to chair with James and RW. Pt required maxA for chika care and oral care. Pt combed hair with supervision. Recommend d/c back to SNF rehab when medically ready.    Coping/Psychosocial   Plan of Care Reviewed With patient   Plan of Care Review   Progress improving

## 2019-02-11 NOTE — H&P
[facility] Medicine Services  HISTORY AND PHYSICAL    Patient Name: Erma West  : 1929  MRN: [id number]  Primary Care Physician: Rodrigo Martínez MD  Date of admission: 2/10/2019      Subjective   Subjective     Chief Complaint:  Weakness    HPI:  Erma West is a 89 y.o. female with a history CKD, DM, CVA, anemia, hypothyroidism, pulmonary HTN, presents to the ED with complaints of generalized weakness.  Patient has dysarthria and is currently at baseline per her son.  Family reports that she had the flu in January and has had worsening generalized weakness since.  She does have chronic diarrhea and is treated with imodium at home.  Today after eating breakfast she went to the bathroom, felt weak while on the toilet and had a witnessed syncopal episode per her son.  Son states that she was unresponsive for an unknown time which prompted them to send her to the ED.  Patient reports having a nonproductive cough, occasional shortness of air, and mild nausea.  She denies fever, chills, chest pain, abdominal pain, vomiting, dysuria, edema, or any other complaints at this time.  Chest xray shows small right pleural effusion, and increased pulmonary vascularity within the lung fields bilaterally.  CT of the head shows no acute intracranial abnormality.  MRI of the brain shows no evidence of acute intracranial abnormality.  Patient is being admitted to the Hospitalist for further evaluation and management.    Review of Systems   Constitutional: Positive for fatigue. Negative for appetite change, chills, diaphoresis and fever.   HENT: Negative.    Eyes: Negative.    Respiratory: Positive for cough and shortness of breath. Negative for choking, chest tightness and wheezing.    Cardiovascular: Negative.    Gastrointestinal: Positive for nausea. Negative for abdominal distention, abdominal pain, blood in stool, constipation, diarrhea and vomiting.   Endocrine: Negative.     Genitourinary: Negative.    Musculoskeletal: Negative.    Skin: Negative.    Allergic/Immunologic: Negative.    Neurological: Positive for syncope, speech difficulty (chronic, currently at baseline) and weakness (generalized). Negative for dizziness, tremors, seizures, facial asymmetry, light-headedness, numbness and headaches.   Hematological: Negative.    Psychiatric/Behavioral: Negative.         Otherwise complete ROS reviewed and is negative except as mentioned in the HPI.    Personal History     Past Medical History:   Diagnosis Date   • Anemia    • Anxiety    • Arthritis    • Arthritis    • Depression    • Diabetes mellitus (CMS/HCC)    • Stroke (CMS/HCC)     x4 over a span of about 30 years   • Thyroid disorder    • Ulcer        Past Surgical History:   Procedure Laterality Date   • APPENDECTOMY     • ARTERIAL BYPASS SURGERY  2007   • BACK SURGERY     • CARDIAC CATHETERIZATION  2007   • CATARACT EXTRACTION Bilateral    •  SECTION      x4    • CHOLECYSTECTOMY     • COLONOSCOPY  2016   • CORONARY ANGIOPLASTY WITH STENT PLACEMENT     • HEMORRHOIDECTOMY      x2   • HYSTERECTOMY     • RESECTION RIBS EXTRAPLEURAL         Family History: family history includes Diabetes in her father, mother, and sister; Heart attack in her brother; Heart disease in her mother, sister, and sister; Heart failure in her sister. Otherwise pertinent FHx was reviewed and unremarkable.     Social History:  reports that  has never smoked. she has never used smokeless tobacco. She reports that she does not drink alcohol or use drugs.  Social History     Social History Narrative   • Not on file       Medications:    Available home medication information reviewed.  Medications Prior to Admission   Medication Sig Dispense Refill Last Dose   • acetaminophen (TYLENOL) 325 MG tablet Take 2 tablets by mouth Every 6 (Six) Hours As Needed for Mild Pain .      • carvedilol (COREG) 25 MG tablet Take 1 tablet by mouth 2 (Two) Times a Day With  Meals.   2/10/2019 at 0900   • Cyanocobalamin (B-12) 1000 MCG/ML kit Inject 1 mL as directed Every 30 (Thirty) Days.   1/26/2019   • dicyclomine (BENTYL) 10 MG capsule Take 10 mg by mouth 2 (Two) Times a Day.   2/10/2019 at 0900   • Ferrous Sulfate (IRON) 325 (65 Fe) MG tablet Take 325 mg by mouth See Admin Instructions. Take 1 tab BID every other day   2/9/2019 at Unknown time   • folic acid (FOLVITE) 1 MG tablet Take 0.5 tablets by mouth Daily.   2/10/2019 at Unknown time   • furosemide (LASIX) 40 MG tablet Take 40 mg by mouth Daily.   2/10/2019 at Unknown time   • glimepiride (AMARYL) 1 MG tablet Take 1 mg by mouth Every Morning Before Breakfast.   2/10/2019 at Unknown time   • guaiFENesin (ROBITUSSIN) 100 MG/5ML syrup Take 200 mg by mouth 3 (Three) Times a Day As Needed for Cough.      • hydrocortisone (ANUSOL-HC) 25 MG suppository Insert 25 mg into the rectum 2 (Two) Times a Day As Needed for Hemorrhoids.   More than a month at Unknown time   • ipratropium-albuterol (DUO-NEB) 0.5-2.5 mg/3 ml nebulizer Take 3 mL by nebulization Every 4 (Four) Hours As Needed for Wheezing or Shortness of Air. 360 mL  2/10/2019 at 1200   • levoFLOXacin (LEVAQUIN) 500 MG tablet Take 500 mg by mouth Daily.   2/10/2019 at 0600   • levothyroxine (SYNTHROID, LEVOTHROID) 88 MCG tablet Take 88 mcg by mouth Daily.   2/10/2019 at Unknown time   • lithium carbonate 150 MG capsule Take 150 mg by mouth Daily.   2/10/2019 at Unknown time   • Multiple Vitamins-Minerals (ICAPS AREDS 2) capsule Take 1 capsule by mouth 2 (Two) Times a Day.   2/10/2019 at 0900   • neomycin-bacitracin-polymyxin (NEOSPORIN) 5-400-5000 ointment Apply 1 application topically to the appropriate area as directed Daily. Apply to right 5th toenail bed.   2/10/2019 at Unknown time   • NIFEdipine XL (PROCARDIA XL) 60 MG 24 hr tablet Take 60 mg by mouth Daily.   2/10/2019 at Unknown time   • nitroglycerin (NITROSTAT) 0.4 MG SL tablet Place 0.4 mg under the tongue Every 5  (Five) Minutes As Needed for chest pain. Take no more than 3 doses in 15 minutes.   More than a month at Unknown time   • pantoprazole (PROTONIX) 40 MG EC tablet Take 40 mg by mouth Daily.   2/10/2019 at Unknown time   • QUEtiapine (SEROquel) 100 MG tablet Take 100 mg by mouth Every Night.   2/9/2019 at Unknown time       Allergies   Allergen Reactions   • Penicillins Hives   • Percodan [Oxycodone-Aspirin]      Headaces       Objective   Objective     Vital Signs:   Temp:  [98.1 °F (36.7 °C)] 98.1 °F (36.7 °C)  Heart Rate:  [49-66] 60  Resp:  [16] 16  BP: (121-138)/(55-76) 130/76        Physical Exam   Constitutional: She appears well-developed and well-nourished. No distress.   HENT:   Head: Normocephalic.   Eyes: Pupils are equal, round, and reactive to light.   Neck: Normal range of motion. Neck supple.   Cardiovascular: Intact distal pulses. Exam reveals no gallop and no friction rub.   Murmur heard.  Bradycardia with irregular beats   Pulmonary/Chest: Effort normal and breath sounds normal. No stridor. No respiratory distress. She has no wheezes. She has no rales.   Abdominal: Soft. Bowel sounds are normal. She exhibits no distension and no mass. There is no tenderness. There is no rebound and no guarding.   Musculoskeletal: Normal range of motion. She exhibits no edema, tenderness or deformity.   Neurological: She is alert.   Alert and oriented to person and place with some word salad during conversation.  Strength is equal bilaterally with generalized weakness.  No sensory deficit.   Skin: Skin is warm and dry. No rash noted. She is not diaphoretic. No erythema. No pallor.   Skin tear right shin   Psychiatric: She has a normal mood and affect. Her behavior is normal.        Results Reviewed:  I have personally reviewed current lab, radiology, and data and agree.    Results from last 7 days   Lab Units 02/10/19  1343   WBC 10*3/mm3 6.61   HEMOGLOBIN g/dL 8.7*   HEMATOCRIT % 28.6*   PLATELETS 10*3/mm3 178      Results from last 7 days   Lab Units 02/10/19  1343 02/10/19  1340   SODIUM mmol/L 131*  --    POTASSIUM mmol/L 4.3  --    CHLORIDE mmol/L 98*  --    CO2 mmol/L 31.0  --    BUN mg/dL 69*  --    CREATININE mg/dL 2.31*  --    GLUCOSE mg/dL 142*  --    CALCIUM mg/dL 8.7  --    ALT (SGPT) U/L 8  --    AST (SGOT) U/L 8  --    TROPONIN I ng/mL  --  0.01     Estimated Creatinine Clearance: 13.8 mL/min (A) (by C-G formula based on SCr of 2.31 mg/dL (H)).  Brief Urine Lab Results  (Last result in the past 365 days)      Color   Clarity   Blood   Leuk Est   Nitrite   Protein   CREAT   Urine HCG        02/10/19 1421 Yellow Clear Negative Negative Negative Negative             No results found for: BNP  Imaging Results (last 24 hours)     Procedure Component Value Units Date/Time    MRI Brain Without Contrast [[id number]] Collected:  02/10/19 1759     Updated:  02/10/19 1759    Narrative:       EXAMINATION: MRI BRAIN WO CONTRAST - 2/10/2019     INDICATION: R41.82-Altered mental status, unspecified; R47.1-Dysarthria  and anarthria; R55-Syncope and collapse. Mental status change.         TECHNIQUE: Routine multiplanar imaging is obtained of the brain without  the administration of gadolinium contrast.     COMPARISON: NONE     FINDINGS: There is no evidence of restricted diffusion to suggest  evidence of an acute ischemic insult. Flow voids are preserved in the  major intracranial vessels. Visualized paranasal sinuses are grossly  clear. There is fluid seen in the mastoid air cells bilaterally. There  is atrophy identified of the brain. Signal changes are seen scattered  throughout the periventricular and  subcortical white matter suggesting  evidence of chronic small vessel ischemic change. There is no hemorrhage  or hydrocephalus. There is no mass, mass effect, or midline shift. No  hemorrhage or hydrocephalus. No abnormal extra-axial fluid collections  identified. Pituitary and sella reveal no gross  abnormality.  Craniovertebral junction is preserved       Impression:       There is mucosal thickening involving the sphenoid sinuses  with fluid in the mastoid air cells bilaterally. Atrophy identified  throughout the brain with chronic small vessel ischemic changes seen  scattered throughout the periventricular and subcortical white matter.  No evidence of acute intracranial abnormality.      DICTATED:   2/10/2019  EDITED/ls :   2/10/2019        XR Chest 1 View [[id number]] Collected:  02/10/19 1557     Updated:  02/10/19 1558    Narrative:          EXAMINATION: XR CHEST 1 VW - 2/10/2019     INDICATION: Weakness, dizziness, altered mental status.     COMPARISON: 1/22/2019     FINDINGS: Portable chest reveals the heart to be enlarged. Large hiatal  hernia identified. Small right pleural effusion with mild increased  markings at the right lung base. Some improvement seen in aeration of  the right upper lung field. Increased markings as well at the left lung  base.           Impression:       Heart is enlarged with increased markings seen of  the lung  bases bilaterally with small right pleural effusion. Increased pulmonary  vascularity within the lung fields bilaterally.     DICTATED:   2/10/2019  EDITED/ls :   2/10/2019        CT Head Without Contrast [[id number]] Collected:  02/10/19 1531     Updated:  02/10/19 1531    Narrative:       EXAMINATION: CT HEAD WO CONTRAST - 2/10/2019     INDICATION: Mental status change.     TECHNIQUE: Multiple axial CT imaging is obtained of the head from skull  base to skull vertex without the administration of intravenous contrast.     The radiation dose reduction device was turned on for each scan per the  ALARA (As Low as Reasonably Achievable) protocol.     COMPARISON: 6/8/2018     FINDINGS: Mild atrophy identified the brain. Minimal low-density area  seen in the periventricular and subcortical white matter suggesting  chronic small vessel ischemic change. No hemorrhage or  "hydrocephalus. No  mass, mass effect, or midline shift. No abnormal extra-axial fluid  collections identified. The bony structures reveal no evidence of  osseous abnormality. The visualized paranasal sinuses are with mucosal  thickening in the sphenoid sinuses and ethmoid air cells. The mastoid  air cells are patent.       Impression:       Paranasal sinusitis with mild atrophy and chronic changes  seen of the brain and no acute intracranial abnormality identified.     DICTATED:   2/10/2019  EDITED/ls :   2/10/2019               Results for orders placed during the hospital encounter of 01/18/19   Adult Transthoracic Echo Complete W/ Cont if Necessary Per Protocol    Narrative · There is mild RV enlargement and severe tricuspid regurgitation with an   estimated RV systolic pressure of 69mmHg c/w severe pulmonary artery   hypertension.  · There is mild aortic valve sclerosis.  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  · The estimated left ventricular ejection fraction is 46%-50%.  · Agitated saline reveals no evidence of R>L Intracardiac shunting.  · There are no other important findings on this study.          Assessment/Plan   Assessment / Plan     Active Hospital Problems    Diagnosis Date Noted   • **Syncope [R55] 02/10/2019   • Acute renal failure superimposed on chronic kidney disease (CMS/HCC) [N17.9, N18.9] 02/10/2019     Priority: High   • Generalized weakness [R53.1] 02/10/2019     Priority: Medium   • Dysarthria [R47.1] 02/10/2019     Priority: Medium   • Hyponatremia [E87.1] 02/10/2019     Priority: Low   • Pleural effusion on right [J90] 02/10/2019   • Anemia [D64.9] 02/10/2019   • Pulmonary hypertension, moderate to severe (CMS/HCC) [I27.20] 01/20/2019   • CAD (coronary artery disease) [I25.10] 03/31/2018   • Hypothyroidism [E03.9] 03/31/2018   • Type 2 diabetes mellitus (CMS/HCC) [E11.9] 03/31/2018   • Anxiety [F41.9] 03/31/2018       ASSESSMENT " AND PLAN:    1.  Syncope   -fall precautions   -echo and carotid in the am   -gentle IV fluids   -orthostatic vital signs   -neurovascular checks   -cbc, bmp, tsh, flp a1c in the am    2.  TIN on CKD   -urine studies   -NS @ 75 ml/hr   -renal ultrasound   -strict I&Os   -bmp in the am    3.  Generalized weakness   -consult pt/ot in the am   -fall precautions   -consult case management in the am   -tsh, b12 in the am    4.  Dysarthria   -per family patient is at baseline   -CT of the head/MRI of the brain negative for acute abnormality   -if speech worsens consider neuro consult    5.  Hyponatremia   -urine sodium and urine osmolality   -IV fluids   -bmp in the am    6.  Anemia   -stable   -anemia profile   -cbc in the am    7.  T2DM   -a1c in the am   -fsbg with ssi    8.  CAD    9.  Hypothyroidism   -tsh in the am    10.  Anxiety and depression    -continue home meds    11.  Small right pleural effusion   -monitor    12.  Pulmonary HTN      DVT prophylaxis:  heparin    CODE STATUS:    Code Status and Medical Interventions:   Ordered at: 02/10/19 2016     Level Of Support Discussed With:    Patient     Code Status:    CPR     Medical Interventions (Level of Support Prior to Arrest):    Full       Admission Status:  I believe this patient meets OBSERVATION status, however if further evaluation or treatment plans warrant, status may change.  Based upon current information, I predict patient's care encounter to be less than or equal to 2 midnights.      Electronically signed by RADHA Matamoros, 02/10/19, 7:29 PM.          I have seen and evaluated the patient.  I have performed a independent history and physical examination.  I agree with the above assessment and plan.    Cheng Garcia,   10:20 PM  2/10/19

## 2019-02-11 NOTE — PLAN OF CARE
Problem: Patient Care Overview  Goal: Plan of Care Review  Outcome: Ongoing (interventions implemented as appropriate)   02/11/19 3536   OTHER   Outcome Summary Pt tolerates bed level PT eval only due to somnolence and nausea. She particpated very little during eval. Dtr reports recent increased need for oxygen and more assist for t/f, unable to tolerate ambulation over last few days at [facility]. Pt will likely need continued SNF level therapy at d/c.    Coping/Psychosocial   Plan of Care Reviewed With patient;daughter

## 2019-02-11 NOTE — PROGRESS NOTES
Discharge Planning Assessment  [facility]     Patient Name: Erma West  MRN: [id number]  Today's Date: 2/11/2019    Admit Date: 2/10/2019    Discharge Needs Assessment     Row Name 02/11/19 1410       Living Environment    Lives With  spouse    Name(s) of Who Lives With Patient  Transfer from [facility].  Had previously lived at home with spouse    Current Living Arrangements  home/apartment/condo    Primary Care Provided by  self    Provides Primary Care For  no one    Family Caregiver if Needed  child(brayden), adult;spouse    Family Caregiver Names  Alyce Washignton (daughter) [phone]    Quality of Family Relationships  helpful;supportive    Able to Return to Prior Arrangements  other (see comments)    Living Arrangement Comments  Daughter would like to take patient home with home health and assistance.        Resource/Environmental Concerns    Resource/Environmental Concerns  none       Transition Planning    Patient/Family Anticipates Transition to  home with family;home with help/services    Patient/Family Anticipated Services at Transition  home health care    Transportation Anticipated  family or friend will provide       Discharge Needs Assessment    Readmission Within the Last 30 Days  other (see comments) Admission on 1/18 for flu and pneumonia    Concerns to be Addressed  discharge planning    Equipment Currently Used at Home  walker, rolling;wheelchair;commode;shower chair;oxygen Patient has 02 concentrator and portable tanks for discharge at home.  Patient is on 2L NC at baseline.  02 is provided by [employer].      Anticipated Changes Related to Illness  inability to care for self    Equipment Needed After Discharge  hospital bed Daughter inquiring about hospital bed rental    Outpatient/Agency/Support Group Needs  homecare agency    Discharge Facility/Level of Care Needs  home with home health    Offered/Gave Vendor List  no    Patient's Choice of Community Agency(s)  [facility]   "[facility]        Discharge Plan     Row Name 02/11/19 1414       Plan    Plan  Home with home health and caregivers.    Patient/Family in Agreement with Plan  yes    Plan Comments  Spoke with patient's daughter Alyce per phone call.  Daughter states that patient has been at [facility] for short term rehab since late Jan. after admisson from Jan. 18th after having the flu.  PTA patient was independent with ADL's and was able to care for herself and  whom she lives with.  Daughter states that patient has been walking 300 feet at rehab with rolling walker,  but has been very weak the last few days before this admission.  PT and OT evaluations have been ordered for patient to determine level of care needed at discharge.  Patient has rolling walker, wheelchair, bedside commode, shower chair and oxygen at home with a concentrator and portable tanks for discharge.  Patient wears 2L 02 cont. at baseline, and it is provided by [facility].  PTA patient was receiving home health with [facility]  for PT and OT.  Spoke with Terese at [facility].  They will follow patient in hospital and can continue home health services for patient at discharge.  Confirmed with daughter that PCP is Rodrigo Martínez M.D. and that insurance provider is [employer] [facility].  Daughter states that patient can afford medications.  Daughter would like to take patient home with home health at discharge, and plans on hiring personal caregivers for assist if needs.  Daughter states that \"her mother has been really depressed at rehab, and she wants to go home with her .  Also we are close to the end of her days left with paid rehab, and I don't know if they can do much more for her. \"  Spoke with Amanda at [facility].  She will determine how many skilled days patient has for short term rehab and contact case management.  Daughter has also inquired about renting hospital bed after discharge " if needs for patient.  Information for rental provided.  Will continue to discuss home with home health as appropriate for patient.  Family will transport at discharge.  Case management will continue to follow for discharge planning needs.      Final Discharge Disposition Code  06 - home with home health care        Destination      No service coordination in this encounter.      Durable Medical Equipment      Service Provider Request Status Selected Services Address Phone Number Fax Number    [facility] Pending - No Request Sent N/A [address] [facility] [address] [phone]      Dialysis/Infusion      No service coordination in this encounter.      Home Medical Care      Service Provider Request Status Selected Services Address Phone Number Fax Number    [facility] Pending - No Request Sent N/A [address], [place] [address] [phone] --      Community Resources      No service coordination in this encounter.          Demographic Summary     Row Name 02/11/19 140       General Information    Admission Type  inpatient    Arrived From  subacute/long term acute care [facility]    Referral Source  admission list    Reason for Consult  discharge planning    Preferred Language  English     Used During This Interaction  no    General Information Comments  PCP:  Rodrigo Martínez M.D. confirmed with patient's daughter        Functional Status     Row Name 02/11/19 1408       Functional Status    Usual Activity Tolerance  good    Current Activity Tolerance  fair       Functional Status, IADL    Medications  assistive person    Meal Preparation  assistive person    Housekeeping  assistive person    Laundry  assistive person    Shopping  assistive person        Psychosocial    No documentation.       Abuse/Neglect    No documentation.       Legal    No documentation.       Substance Abuse    No documentation.       Patient Forms    No  documentation.           Laura Luo RN

## 2019-02-11 NOTE — PROGRESS NOTES
"    [facility] Medicine Services  PROGRESS NOTE    Patient Name: Erma West  : 1929  MRN: [id number]    Date of Admission: 2/10/2019  Length of Stay: 0  Primary Care Physician: Rodrigo Martínez MD    Subjective   Subjective     CC: f/u syncope    HPI: Patient still very sleepy this am. Her blood glucose was in 30's this am which increased to normal after PO intake. Her daughter is concerned about patients worsening \"dwindles\" over past week or so.    Review of Systems  Unable to obtain ROS due to AMS.    Otherwise ROS is negative except as mentioned in the HPI.    Objective   Objective     Vital Signs:   Temp:  [97.3 °F (36.3 °C)-98.1 °F (36.7 °C)] 97.3 °F (36.3 °C)  Heart Rate:  [49-66] 57  Resp:  [16] 16  BP: (121-164)/(55-76) 164/59        Physical Exam:  Constitutional: Sleeping comfortably, difficult to arouse,   HENT: NCAT, mucous membranes moist  Respiratory: Clear to auscultation bilaterally, respiratory effort normal   Cardiovascular: RRR, no murmurs, rubs, or gallops, palpable pedal pulses bilaterally  Gastrointestinal: Positive bowel sounds, soft, nontender, nondistended  Musculoskeletal: No bilateral ankle edema  Psychiatric: RICKIE  Neurologic: RICKIE, but periodically moves extremities  Skin: No rashes    Results Reviewed:  I have personally reviewed current lab, radiology, and data and agree.    Results from last 7 days   Lab Units 19  0505 02/10/19  1343   WBC 10*3/mm3 7.14 6.61   HEMOGLOBIN g/dL 8.4* 8.7*   HEMATOCRIT % 27.6* 28.6*   PLATELETS 10*3/mm3 177 178     Results from last 7 days   Lab Units 19  0505 02/10/19  1343 02/10/19  1340 19  1555   SODIUM mmol/L 136 131*  --  136   POTASSIUM mmol/L 3.6 4.3  --  4.6   CHLORIDE mmol/L 101 98*  --  100   CO2 mmol/L 29.0 31.0  --  29.0   BUN mg/dL 62* 69*  --  67*   CREATININE mg/dL 2.05* 2.31*  --  2.01*   GLUCOSE mg/dL 36* 142*  --  78   CALCIUM mg/dL 8.8 8.7  --  8.5*   ALT (SGPT) U/L  --  8  --  "  --    AST (SGOT) U/L  --  8  --   --    TROPONIN I ng/mL  --   --  0.01  --      Estimated Creatinine Clearance: 15.5 mL/min (A) (by C-G formula based on SCr of 2.05 mg/dL (H)).    No results found for: BNP    Microbiology Results Abnormal     None          Imaging Results (last 24 hours)     Procedure Component Value Units Date/Time    US Renal Bilateral [[id number]] Collected:  02/11/19 1000     Updated:  02/11/19 1000    Narrative:       EXAMINATION: US RENAL, BILATERAL-02/11/2019:     INDICATION: TIN on CKD; R41.82-Altered mental status, unspecified;  R47.1-Dysarthria and anarthria; R55-Syncope and collapse.      TECHNIQUE: Ultrasound of kidneys and urinary bladder.     COMPARISON: NONE.     FINDINGS:  The right kidney measures 9.5 cm in length without evidence  of hydronephrosis, contour deforming mass or obvious calculi.     The left kidney measures 8.5 cm in length without evidence of  hydronephrosis, contour deforming mass or obvious calculi.     The urinary bladder is severely distended otherwise, grossly  unremarkable.       Impression:       1.  Severe distention of the urinary bladder without focal thickening or  irregularity otherwise noted.  2.  No hydronephrosis or acute sonographic findings of the bilateral  kidneys.     D:  02/11/2019  E:  02/11/2019              MRI Brain Without Contrast [[id number]] Collected:  02/10/19 1759     Updated:  02/11/19 0902    Narrative:       EXAMINATION: MRI BRAIN WO CONTRAST - 2/10/2019     INDICATION: R41.82-Altered mental status, unspecified; R47.1-Dysarthria  and anarthria; R55-Syncope and collapse. Mental status change.         TECHNIQUE: Routine multiplanar imaging is obtained of the brain without  the administration of gadolinium contrast.     COMPARISON: NONE     FINDINGS: There is no evidence of restricted diffusion to suggest  evidence of an acute ischemic insult. Flow voids are preserved in the  major intracranial vessels. Visualized paranasal sinuses are  grossly  clear. There is fluid seen in the mastoid air cells bilaterally. There  is atrophy identified of the brain. Signal changes are seen scattered  throughout the periventricular and  subcortical white matter suggesting  evidence of chronic small vessel ischemic change. There is no hemorrhage  or hydrocephalus. There is no mass, mass effect, or midline shift. No  hemorrhage or hydrocephalus. No abnormal extra-axial fluid collections  identified. Pituitary and sella reveal no gross abnormality.  Craniovertebral junction is preserved       Impression:       There is mucosal thickening involving the sphenoid sinuses  with fluid in the mastoid air cells bilaterally. Atrophy identified  throughout the brain with chronic small vessel ischemic changes seen  scattered throughout the periventricular and subcortical white matter.  No evidence of acute intracranial abnormality.      DICTATED:   2/10/2019  EDITED/ls :   2/10/2019      This report was finalized on 2/11/2019 9:00 AM by Dr. Maribel Hale MD.       CT Head Without Contrast [[id number]] Collected:  02/10/19 1531     Updated:  02/11/19 0902    Narrative:       EXAMINATION: CT HEAD WO CONTRAST - 2/10/2019     INDICATION: Mental status change.     TECHNIQUE: Multiple axial CT imaging is obtained of the head from skull  base to skull vertex without the administration of intravenous contrast.     The radiation dose reduction device was turned on for each scan per the  ALARA (As Low as Reasonably Achievable) protocol.     COMPARISON: 6/8/2018     FINDINGS: Mild atrophy identified the brain. Minimal low-density area  seen in the periventricular and subcortical white matter suggesting  chronic small vessel ischemic change. No hemorrhage or hydrocephalus. No  mass, mass effect, or midline shift. No abnormal extra-axial fluid  collections identified. The bony structures reveal no evidence of  osseous abnormality. The visualized paranasal sinuses are with  "mucosal  thickening in the sphenoid sinuses and ethmoid air cells. The mastoid  air cells are patent.       Impression:       Paranasal sinusitis with mild atrophy and chronic changes  seen of the brain and no acute intracranial abnormality identified.     DICTATED:   2/10/2019  EDITED/ls :   2/10/2019         This report was finalized on 2/11/2019 9:00 AM by Dr. Maribel Hale MD.       XR Chest 1 View [[id number]] Collected:  02/10/19 1557     Updated:  02/11/19 0901    Narrative:          EXAMINATION: XR CHEST 1 VW - 2/10/2019     INDICATION: Weakness, dizziness, altered mental status.     COMPARISON: 1/22/2019     FINDINGS: Portable chest reveals the heart to be enlarged. Large hiatal  hernia identified. Small right pleural effusion with mild increased  markings at the right lung base. Some improvement seen in aeration of  the right upper lung field. Increased markings as well at the left lung  base.           Impression:       Heart is enlarged with increased markings seen of  the lung  bases bilaterally with small right pleural effusion. Increased pulmonary  vascularity within the lung fields bilaterally.     DICTATED:   2/10/2019  EDITED/ls :   2/10/2019      This report was finalized on 2/11/2019 8:59 AM by Dr. Maribel Hale MD.             Results for orders placed during the hospital encounter of 01/18/19   Adult Transthoracic Echo Complete W/ Cont if Necessary Per Protocol    Narrative · There is mild RV enlargement and severe tricuspid regurgitation with an   estimated RV systolic pressure of 69mmHg c/w severe pulmonary artery   hypertension.  · There is mild aortic valve sclerosis.  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  · The estimated left ventricular ejection fraction is 46%-50%.  · Agitated saline reveals no evidence of R>L Intracardiac shunting.  · There are no other important findings on this study.          I have reviewed the " medications:    Current Facility-Administered Medications:   •  acetaminophen (TYLENOL) tablet 650 mg, 650 mg, Oral, Q6H PRN, Diana Murdock APRN  •  dextrose (D50W) 25 g/ 50mL Intravenous Solution 25 g, 25 g, Intravenous, Q15 Min PRN, Diana Murdock, APRN  •  dextrose (GLUTOSE) oral gel 15 g, 15 g, Oral, Q15 Min PRN, Diana Murdock APRN  •  dicyclomine (BENTYL) capsule 10 mg, 10 mg, Oral, BID, Diana Murdock APRN, 10 mg at 02/10/19 2133  •  ferrous sulfate tablet 325 mg, 325 mg, Oral, Q48H **AND** ferrous sulfate tablet 325 mg, 325 mg, Oral, Q48H, Cheng Garcia, DO  •  folic acid (FOLVITE) tablet 500 mcg, 500 mcg, Oral, Daily, Diana Murdock APRN  •  glucagon (human recombinant) (GLUCAGEN DIAGNOSTIC) injection 1 mg, 1 mg, Subcutaneous, PRN, Diana Murdock APRN  •  guaiFENesin (ROBITUSSIN) 100 MG/5ML syrup 200 mg, 200 mg, Oral, TID PRN, Diana Murdock APRN  •  heparin (porcine) 5000 UNIT/ML injection 5,000 Units, 5,000 Units, Subcutaneous, Q12H, Diana Murdock APRN  •  ipratropium-albuterol (DUO-NEB) nebulizer solution 3 mL, 3 mL, Nebulization, Q4H PRN, Diana Murdock APRN  •  levothyroxine (SYNTHROID, LEVOTHROID) tablet 88 mcg, 88 mcg, Oral, Q AM, Diana Murdock APRN, 88 mcg at 02/11/19 0526  •  lithium carbonate capsule 150 mg - PATIENT SUPPLIED, 150 mg, Oral, Daily, Jenn Saldana M II, DO  •  NIFEdipine XL (PROCARDIA XL) 24 hr tablet 90 mg, 90 mg, Oral, Daily, Jenn Saldana M II, DO  •  nitroglycerin (NITROSTAT) SL tablet 0.4 mg, 0.4 mg, Sublingual, Q5 Min PRN, Diana Murdock APRN  •  ondansetron (ZOFRAN) tablet 4 mg, 4 mg, Oral, Q6H PRN **OR** ondansetron (ZOFRAN) injection 4 mg, 4 mg, Intravenous, Q6H PRN, Diana Murdock APRN  •  pantoprazole (PROTONIX) EC tablet 40 mg, 40 mg, Oral, Daily, Diana Murdock APRN  •  QUEtiapine (SEROquel) tablet 100 mg, 100 mg, Oral, Nightly, Diana Murdock APRN, 100 mg at 02/10/19  2134  •  sodium chloride 0.9 % flush 10 mL, 10 mL, Intravenous, PRN, Ede Rios MD  •  sodium chloride 0.9 % flush 3 mL, 3 mL, Intravenous, Q12H, Diana Murdock APRN  •  sodium chloride 0.9 % flush 3-10 mL, 3-10 mL, Intravenous, PRN, Diana Murdock APRN  •  sodium chloride 0.9 % infusion, 75 mL/hr, Intravenous, Continuous, Diana Murdock APRN, Last Rate: 75 mL/hr at 02/11/19 0610, 75 mL/hr at 02/11/19 0610      Assessment/Plan   Assessment / Plan     Active Hospital Problems    Diagnosis Date Noted   • **Syncope [R55] 02/10/2019   • Acute metabolic encephalopathy [G93.41] 02/11/2019   • Acute renal failure superimposed on chronic kidney disease (CMS/HCC) [N17.9, N18.9] 02/10/2019   • Pleural effusion on right [J90] 02/10/2019   • Hyponatremia [E87.1] 02/10/2019   • Anemia [D64.9] 02/10/2019   • Generalized weakness [R53.1] 02/10/2019   • Dysarthria [R47.1] 02/10/2019   • Pulmonary hypertension, moderate to severe (CMS/HCC) [I27.20] 01/20/2019   • CAD (coronary artery disease) [I25.10] 03/31/2018   • Hypothyroidism [E03.9] 03/31/2018   • Type 2 diabetes mellitus with hypoglycemia (CMS/HCC) [E11.649] 03/31/2018       Brief Hospital Course to date:  Erma West is a 89 y.o. female admitted from SNF after syncopal episode. Upon arrival she was noted to have TIN/CKD.    A/P:    Metabolic Encephalopathy  Hypoglycemia w/ syncope  --Likely related to hypoglycemia in setting of sulfonurea use and TIN. Hold offending meds and hydrate so as to resolve TIN.  --Will plan to stop sulfonurea indefinitely.  --If no improvement with above consider EEG. CT head/MRI brain unremarkable.    TIN/CKD III, likely intra-vascular volume depletion  --TIN/CKD III likely due to increasing diuresis at [facility]. It is better since admission with IVF. When she left our facility her creatinine was 1.05 and it has been steadily increasing since 2/1 coiniciding with increasing PO lasix dosing. Will hold that and  hydrate gently.    Hypoxia  A/C systolic CHF w/ pleural effusion  VHD  Pulm HTN  --Also per daughter her O2 requirement has increased at [place], which is supported by CXR. Difficult situation given renal dysfunction as above in setting of known A/C CHF, VHD, pulmonary HTN. Will hold lasix as above.  --Doesn't appear that her right sided effusion large enough to tap at this time. Follow serial CXR given that we are hydrating her. May consider thora if worsens.  --Patient follows with Dr. Durand and was last seen approx 6 months ago. At that time had nl EF, now reduced which is particularly troublesome in light of severe VHD and difficulty w/ diuresis. Likely no good options aside from medical mgmt.    Weakness, debility  --PT/OT. CM.    DVT Prophylaxis: SQH    Disposition: I expect the patient to be discharged either home vs SNF once above resolved.    CODE STATUS:   Code Status and Medical Interventions:   Ordered at: 02/10/19 2016     Level Of Support Discussed With:    Patient     Code Status:    CPR     Medical Interventions (Level of Support Prior to Arrest):    Full         Electronically signed by Jenn Saldana II, DO, 02/11/19, 10:24 AM.

## 2019-02-11 NOTE — CONSULTS
"Patient Name:  Erma West  :  1929  DOS:  19    Active Hospital Problems    Diagnosis   • **Syncope   • Acute metabolic encephalopathy   • Acute renal failure superimposed on chronic kidney disease (CMS/HCC)   • Pleural effusion on right   • Hyponatremia   • Anemia   • Generalized weakness   • Dysarthria   • Pulmonary hypertension, moderate to severe (CMS/HCC)   • Acute on chronic congestive heart failure (CMS/HCC)     Echo 2019:  Interpretation Summary     · There is mild RV enlargement and severe tricuspid regurgitation with an estimated RV systolic pressure of 69mmHg c/w severe pulmonary artery hypertension.  · There is mild aortic valve sclerosis.  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  · The estimated left ventricular ejection fraction is 46%-50%.  · Agitated saline reveals no evidence of R>L Intracardiac shunting.  · There are no other important findings on this study          • CAD (coronary artery disease)   • Hypothyroidism   • Type 2 diabetes mellitus with hypoglycemia (CMS/HCC)   • Valvular heart disease       89 yo resident of SNF.  Presented after syncopal episode felt likely to hypoglycemia.  Hx of CKD III with worsening creatinine.  Lasix currently on hold.  Hx of CHF, VHD, pulmonary HTN.  Consult has been received.  Medical records have been reviewed.   Education provided.  Education time 10 min.  Patient to be scheduled follow up 1 week  post discharge.    Echo Results:   echo completed 02/10/19, results pending    Echo 19  · There is mild RV enlargement and severe tricuspid regurgitation with an estimated RV systolic pressure of 69mmHg c/w severe pulmonary artery hypertension.  · There is mild aortic valve sclerosis.  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  · The estimated left ventricular ejection fraction is 46%-50%.  · Agitated saline reveals no " evidence of R>L Intracardiac shunting.  · There are no other important findings on this study      Heart Failure Quality Measures    ACE I, ARB, ARNI (if EF <40%)     If no contraindications:  EF >40% in past.  New echo results pending.  CKD III with worsening creatine    Evidence-based Beta Blocker (EF<40%)    (Bisoprolol, Carvedilol, Metoprolol Succinate)  Yes  Coreg    Aldosterone Antagonist (EF <40%)  If no contraindications:  TIN / CKD / Renal Stenosis    Heart Failure Education    Attempted to provide education.  Pt appeared [place] to person.  Word salad during conversation.  No family present.     Discuss role of [facility], s/s HF worsening, medications management.

## 2019-02-11 NOTE — THERAPY EVALUATION
Acute Care - Physical Therapy Initial Evaluation  [facility]     Patient Name: Erma West  : 1929  MRN: [id number]  Today's Date: 2019   Onset of Illness/Injury or Date of Surgery: 19  Date of Referral to PT: 19  Referring Physician: RADHA Murdock      Admit Date: 2/10/2019    Visit Dx:     ICD-10-CM ICD-9-CM   1. Altered mental status, unspecified altered mental status type R41.82 780.97   2. Dysarthria R47.1 784.51   3. Syncope and collapse R55 780.2     Patient Active Problem List   Diagnosis   • Iron deficiency   • Chest pain   • Multifocal pneumonia   • Leukocytosis   • Anxiety   • Type 2 diabetes mellitus with hypoglycemia (CMS/HCC)   • CAD (coronary artery disease)   • Valvular heart disease   • History of CVA (cerebrovascular accident)   • Hypothyroidism   • Pneumonia of both lungs due to infectious organism   • Multifocal pneumonia   • Acute on chronic congestive heart failure (CMS/HCC)   • Influenza A   • Acute on chronic respiratory failure with hypoxia (CMS/HCC)   • Pneumonia due to infectious organism   • CKD (chronic kidney disease) stage 3, GFR 30-59 ml/min (CMS/HCC)   • Occult GI bleeding   • Pulmonary hypertension, moderate to severe (CMS/HCC)   • Acute renal failure superimposed on chronic kidney disease (CMS/HCC)   • Syncope   • Pleural effusion on right   • Hyponatremia   • Anemia   • Generalized weakness   • Dysarthria   • Acute metabolic encephalopathy     Past Medical History:   Diagnosis Date   • Anemia    • Anxiety    • Arthritis    • Arthritis    • Depression    • Diabetes mellitus (CMS/HCC)    • Stroke (CMS/HCC)     x4 over a span of about 30 years   • Thyroid disorder    • Ulcer      Past Surgical History:   Procedure Laterality Date   • APPENDECTOMY     • ARTERIAL BYPASS SURGERY     • BACK SURGERY     • CARDIAC CATHETERIZATION     • CATARACT EXTRACTION Bilateral    •  SECTION      x4    • CHOLECYSTECTOMY     • COLONOSCOPY  2016   •  CORONARY ANGIOPLASTY WITH STENT PLACEMENT     • HEMORRHOIDECTOMY      x2   • HYSTERECTOMY     • RESECTION RIBS EXTRAPLEURAL          PT ASSESSMENT (last 12 hours)      Physical Therapy Evaluation     Row Name 02/11/19 0840          PT Evaluation Time/Intention    Subjective Information  no complaints  -EJ     Document Type  evaluation  -EJ     Mode of Treatment  individual therapy  -EJ     Patient Effort  poor  -EJ     Symptoms Noted During/After Treatment  none  -EJ     Comment  Pt dtr reports nausea, sleepiness- had taken her medications to help her sleep 3 hours later than normal. Pt unable to stay alert enough to answer question verbally.  -EJ     Row Name 02/11/19 0840          General Information    Patient Profile Reviewed?  yes  -EJ     Onset of Illness/Injury or Date of Surgery  02/11/19  -EJ     Referring Physician  RADHA Murdock  -EJ     Patient Observations  alert;cooperative;agree to therapy  -EJ     Patient/Family Observations  dtr in room, gives pt hx and home setup  -EJ     General Observations of Patient  pt in semifowlers in bed, eyes closed, dtr reports she has been very sleepy or calling out that she is nauseous.  -EJ     Prior Level of Function  mod assist:;all household mobility;ADL's pt t/f from [facility], walking then x~2 days t/f to w/c only  -EJ     Equipment Currently Used at Home  walker, rolling;oxygen sponge bathes  -EJ     Pertinent History of Current Functional Problem  Pt presents from [facility] after eating, feeling weak on toilet and had syncopal episode. Pt was unresponsive for unknown amount of time and sent to ED. Pt c/o cough, SOA and nausea. X-ray indicates small R pleural effusion, CT and MRI of head negative. TIN on CKD. PT dtr also reports recent AMS, weakness. Previous admit in January was due to flu, has been at [facility] since  -EJ     Existing Precautions/Restrictions  fall;oxygen therapy device and L/min;other (see comments) Usually schedules appointments for PM  -EJ      Limitations/Impairments  visual macular degeneration  -EJ     Risks Reviewed  patient and family:;nausea/vomiting;dizziness;increased discomfort;change in vital signs  -EJ     Benefits Reviewed  patient:;improve function;increase strength;increase independence;increase balance;decrease pain;decrease risk of DVT;improve skin integrity;increase knowledge  -EJ     Barriers to Rehab  medically complex;previous functional deficit  -EJ     Row Name 02/11/19 0840          Relationship/Environment    Primary Source of Support/Comfort  child(brayden)  -EJ     Lives With  spouse  -EJ     Row Name 02/11/19 0840          Resource/Environmental Concerns    Current Living Arrangements  home/apartment/condo  -EJ     Resource/Environmental Concerns  none  -EJ     Row Name 02/11/19 0840          Home Main Entrance    Number of Stairs, Main Entrance  four  -EJ     Stair Railings, Main Entrance  railing on right side (ascending)  -EJ     Row Name 02/11/19 0840          Cognitive Assessment/Intervention- PT/OT    Orientation Status (Cognition)  refused to attempt;unable/difficult to assess pt opens eyes, does not answer questions  -EJ     Follows Commands (Cognition)  25-49% accuracy;follows one step commands;physical/tactile prompts required;other (see comments) following of commands only occured with tactile cue  -EJ     Safety Deficit (Cognitive)  safety precautions awareness;safety precautions follow-through/compliance;insight into deficits/self awareness  -     Row Name 02/11/19 0840          Safety Issues, Functional Mobility    Safety Issues Affecting Function (Mobility)  safety precaution awareness;safety precautions follow-through/compliance;insight into deficits/self awareness  -EJ     Impairments Affecting Function (Mobility)  strength;cognition;endurance/activity tolerance  -     Row Name 02/11/19 0840          Bed Mobility Assessment/Treatment    Comment (Bed Mobility)  deferred, Pt unable to maintain alertness between  extremity testing  -     Row Name 02/11/19 0840          General ROM    GENERAL ROM COMMENTS  BUE WFL with slight impairment at wrist flexion/extension. BLE Df limited to lacking about 5 degrees from neutral, remaining intact  -[facility] Name 02/11/19 0840          MMT (Manual Muscle Testing)    General MMT Comments  Pt does not participate with MMT, allows for AAROM/PROM only 2-/5 demonstrated this date.  -     Row Name 02/11/19 0840          Sensory Assessment/Intervention    Sensory General Assessment  no sensation deficits identified  -[facility] Name 02/11/19 0840          Vision Assessment/Intervention    Vision Assessment Comment  hx of macular degeneration;  assists at home  -     Row Name 02/11/19 0840          Pain Assessment    Additional Documentation  Pain Scale: FACES Pre/Post-Treatment (Group)  -[facility] Name 02/11/19 0840          Pain Scale: FACES Pre/Post-Treatment    Pain: FACES Scale, Pretreatment  4-->hurts little more  -EJ     Pain: FACES Scale, Post-Treatment  4-->hurts little more  -EJ     Pre/Post Treatment Pain Comment  pt dtr reports nausea. no grimaces durin ARDHA  -[facility] Name             Wound 02/10/19 1900 Right anterior;distal;lower leg skin tear    Wound - Properties Group Date first assessed: 02/10/19  -CB Time first assessed: 1900  -CB Present On Admission : yes  -CB Side: Right  -CB Orientation: anterior;distal;lower  -CB Location: leg  -CB Type: skin tear  -CB    Row Name 02/11/19 0840          Coping    Observed Emotional State  flat  -[facility] Name 02/11/19 0840          Plan of Care Review    Plan of Care Reviewed With  patient;daughter  -[facility] Name 02/11/19 0840          Physical Therapy Clinical Impression    Date of Referral to PT  02/11/19  -EJ     PT Diagnosis (PT Clinical Impression)  decreased tolerence to activity, decreased alertness, impaired strength and ROM, increased need for supplemental O2 even at rest  -     Criteria for Skilled  Interventions Met (PT Clinical Impression)  yes;treatment indicated  -EJ     Rehab Potential (PT Clinical Summary)  fair, will monitor progress closely  -EJ     Patient/Family Concerns, Anticipated Discharge Disposition (PT)  family would like to get her home per dtr report  -EJ     Row Name 02/11/19 0840          Vital Signs    Pre SpO2 (%)  96  -EJ     O2 Delivery Pre Treatment  supplemental O2  -EJ     Post SpO2 (%)  96  -EJ     O2 Delivery Post Treatment  supplemental O2  -EJ     Row Name 02/11/19 0840          Physical Therapy Goals    Bed Mobility Goal Selection (PT)  bed mobility, PT goal 1  -EJ     Transfer Goal Selection (PT)  transfer, PT goal 1  -EJ     Gait Training Goal Selection (PT)  gait training, PT goal 1  -EJ     Row Name 02/11/19 0840          Bed Mobility Goal 1 (PT)    Activity/Assistive Device (Bed Mobility Goal 1, PT)  sit to supine/supine to sit  -EJ     Lea Level/Cues Needed (Bed Mobility Goal 1, PT)  minimum assist (75% or more patient effort)  -EJ     Time Frame (Bed Mobility Goal 1, PT)  long term goal (LTG);10 days  -EJ     Row Name 02/11/19 0840          Transfer Goal 1 (PT)    Activity/Assistive Device (Transfer Goal 1, PT)  sit-to-stand/stand-to-sit  -EJ     Lea Level/Cues Needed (Transfer Goal 1, PT)  minimum assist (75% or more patient effort)  -EJ     Time Frame (Transfer Goal 1, PT)  long term goal (LTG);10 days  -EJ     Row Name 02/11/19 0840          Gait Training Goal 1 (PT)    Activity/Assistive Device (Gait Training Goal 1, PT)  gait (walking locomotion);assistive device use;walker, rolling  -EJ     Lea Level (Gait Training Goal 1, PT)  moderate assist (50-74% patient effort)  -EJ     Distance (Gait Goal 1, PT)  50  -EJ     Time Frame (Gait Training Goal 1, PT)  long term goal (LTG);10 days  -EJ     Row Name 02/11/19 0840          Positioning and Restraints    Pre-Treatment Position  in bed  -EJ     Post Treatment Position  bed  -EJ     In Bed   with nsg;with other staff;supine;with family/caregiver  -RADAMES     Row Name 02/11/19 0840          Living Environment    Home Accessibility  stairs to enter home  -RADAMES       User Key  (r) = Recorded By, (t) = Taken By, (c) = Cosigned By    Initials Name Provider Type    Lorena Whipple PT Physical Therapist    Samantha Dale, RN Registered Nurse        Physical Therapy Education     Title: PT OT SLP Therapies (In Progress)     Topic: Physical Therapy (In Progress)     Point: Mobility training (In Progress)     Learning Progress Summary           Patient Acceptance, E, NR,NL by RADAMES at 2/11/2019 10:29 AM   Family Acceptance, E, NR by RADAMES at 2/11/2019 10:29 AM                   Point: Home exercise program (In Progress)     Learning Progress Summary           Patient Acceptance, E, NR,NL by RADAMES at 2/11/2019 10:29 AM   Family Acceptance, E, NR by RADAMES at 2/11/2019 10:29 AM                               User Key     Initials Effective Dates Name Provider Type [facility] 11/20/18 -  Lorena Fernandez PT Physical Therapist PT              PT Recommendation and Plan  Anticipated Discharge Disposition (PT): skilled nursing facility  Planned Therapy Interventions (PT Eval): balance training, bed mobility training, gait training, home exercise program, stretching, strengthening, stair training, ROM (range of motion), patient/family education, transfer training  Therapy Frequency (PT Clinical Impression): daily  Outcome Summary/Treatment Plan (PT)  Anticipated Discharge Disposition (PT): skilled nursing facility  Patient/Family Concerns, Anticipated Discharge Disposition (PT): family would like to get her home per dtr report  Plan of Care Reviewed With: patient, daughter  Outcome Summary: Pt tolerates bed level PT eval only due to somnolence and nausea. She particpated very little during eval. Dtr reports recent increased need for oxygen and more assist for t/f, unable to tolerate ambulation over last few days at [facility].  Pt will likely need continued SNF level therapy at d/c.   Outcome Measures     Row Name 02/11/19 0840             How much help from another person do you currently need...    Turning from your back to your side while in flat bed without using bedrails?  2  -EJ      Moving from lying on back to sitting on the side of a flat bed without bedrails?  2  -EJ      Moving to and from a bed to a chair (including a wheelchair)?  2  -EJ      Standing up from a chair using your arms (e.g., wheelchair, bedside chair)?  2  -EJ      Climbing 3-5 steps with a railing?  1  -EJ      To walk in hospital room?  1  -EJ      AM-PAC 6 Clicks Score  10  -EJ         Functional Assessment    Outcome Measure Options  AM-PAC 6 Clicks Basic Mobility (PT)  -        User Key  (r) = Recorded By, (t) = Taken By, (c) = Cosigned By    Initials Name Provider Type    Lorena Whipple PT Physical Therapist         Time Calculation:   PT Charges     Row Name 02/11/19 0840             Time Calculation    Start Time  0840  -EJ      PT Received On  02/11/19  -      PT Goal Re-Cert Due Date  02/21/19  -         Time Calculation- PT    Total Timed Code Minutes- PT  0 minute(s)  -        User Key  (r) = Recorded By, (t) = Taken By, (c) = Cosigned By    Initials Name Provider Type    Lorena Whipple PT Physical Therapist        Therapy Suggested Charges     Code   Minutes Charges    None           Therapy Charges for Today     Code Description Service Date Service Provider Modifiers Qty    64305076723 HC PT EVAL LOW COMPLEXITY 3 2/11/2019 Lorena Fernandez PT GP 1          PT G-Codes  Outcome Measure Options: AM-PAC 6 Clicks Basic Mobility (PT)  AM-PAC 6 Clicks Score: 10      Lorena Cason PT  2/11/2019

## 2019-02-11 NOTE — PLAN OF CARE
Problem: Patient Care Overview  Goal: Plan of Care Review  Outcome: Ongoing (interventions implemented as appropriate)   02/11/19 1020   OTHER   Outcome Summary Patient presents with a RLE skin tear. At this time steri stripes are in place. Minimal bleeding at this time. Placed Xeroform and wrapped with gauze. To be done daily. Please see orders.    Coping/Psychosocial   Plan of Care Reviewed With patient   Plan of Care Review   Progress no change

## 2019-02-12 NOTE — PLAN OF CARE
Problem: Patient Care Overview  Goal: Interprofessional Rounds/Family Conf  Outcome: Ongoing (interventions implemented as appropriate)  1300 Palliative Team Conference: LOUIE Banda RN, PN; WILBUR Garcia DO; WENCESLAO Arguelles RN, PN; ELIE Syed, RN, CHNIK; RADHA Hills; ANGELA Mata MDiv; STEFANI Loo, [facility], [facility]   02/12/19 1410   Interdisciplinary Rounds/Family Conf   Summary Palliative consult for GOC/ACP and other symptom management. Seen by RADHA Hills. Pt is very anxious to discharge to home in time for her wedding anniversary this Friday. Hospice services mentioned; dtr stated she was informed that pt is not quite ready for hospice, desires to take pt home with Home Health services. Will provide information and referral for home Palliative services to assist with ongoing GOC planning at home. Palliative  to see today for EMS DNR.

## 2019-02-12 NOTE — CONSULTS
"  Erma West  [id number]  2/18/1929   LOS: 1 day   Patient Care Team:  PHYSICIAN: Rodrigo Martínez MD   CARDIOLOGIST: Jim Durand MD, [facility]  CARDIOVASCULAR SUREGON: Dinesh Lucas MD  ANGIOGRAPHER: Dinesh Warren, [facility]  HEMATOLOGIST: Kate Dodson MD  GASTROENTEROLOGIST: Leo Carlson DO    Ms. West is an 89-year-old  white female from [place], [place], resident of [facility]    Chief Complaint:  CHF    Problem List:  1. Coronary artery disease              A. 2007 CABG/MV repair and TV repair, Shayy, data deficient.              B. Left Heart Catheterization 2010, Cypher stent to left main per Kelvin.  2. Valvular Heart Disease              A. S/p mitral and tricuspid repair 2007              B. 2/16 Echocardiogram: EF >65%, mean gradient MV is 7mmHg.   Mild-Moderate TR. S/p mitral and tricuspid annular ring insertions. RVSP 40mmHg.                 C. Echocardiogram 1/20/19: Mild RV enlargement and severe TR with RVSP 69 mmHg consistent with severe pulmonary arterial hypertension, mild aortic valve sclerosis, mitral leaflets and annulus are calcified and demonstrates \"at least \"moderate mixed MR/MS.  LVEF 0.46-0.50, agitated saline reveals no evidence of R>L intracardiac shunting                D. Preliminary echocardiogram 2/11/19: Normal LV cavity size and wall thickness noted.  Trace AR, thickening of the aortic valve, mild to moderate MR, atrial valve ring present, tricuspid valve repair with a ring.  Moderate to severe TR, RVSP millimeters mercury, mild NE, no pericardial effusion, bilateral pleural effusions.  3. Bipolar disorder  4. Hypertension  5. Type 2 diabetes mellitus; HgbA1C 5.7%  6. Dyslipidemia  7. Hypothyroidism on replacement therapy  8. GERD  9. Anxiety/depression  10. H/O GI bleed February 2016: bleeding duodenal ulcer per GI scope with cauterization  11. Paroxysmal Atrial fibrillation                      A. Incidental finding on office November 2016                B. " CHADsVasc 8  12. Chronic kidney disease stage III  13. Pulmonary hypertension  14. History of CVA/TIA x 4  15. Anemia  16. Surgical history           A. Appendectomy           B. CABG/MV and TV repair          C. Back surgery          D. C          E. Cholecystectomy          F. Hemorrhoidectomy          G. SWETHA          H. C section x4          I. Ribs extrapleural resection          J. Cataract    Allergies   Allergen Reactions   • Penicillins Hives   • Percodan [Oxycodone-Aspirin]      Headaces     Medications Prior to Admission   Medication Sig Dispense Refill Last Dose   • acetaminophen (TYLENOL) 325 MG tablet Take 2 tablets by mouth Every 6 (Six) Hours As Needed for Mild Pain .      • carvedilol (COREG) 25 MG tablet Take 1 tablet by mouth 2 (Two) Times a Day With Meals. (Patient taking differently: Take 12.5 mg by mouth 2 (Two) Times a Day With Meals.)   2/10/2019 at 0900   • Cyanocobalamin (B-12) 1000 MCG/ML kit Inject 1 mL as directed Every 30 (Thirty) Days.   1/26/2019   • dicyclomine (BENTYL) 10 MG capsule Take 10 mg by mouth 2 (Two) Times a Day.   2/10/2019 at 0900   • Ferrous Sulfate (IRON) 325 (65 Fe) MG tablet Take 325 mg by mouth See Admin Instructions. Take 1 tab BID every other day   2/9/2019 at Unknown time   • folic acid (FOLVITE) 1 MG tablet Take 0.5 tablets by mouth Daily.   2/10/2019 at Unknown time   • furosemide (LASIX) 40 MG tablet Take 40 mg by mouth Daily.   2/10/2019 at Unknown time   • glimepiride (AMARYL) 1 MG tablet Take 1 mg by mouth Every Morning Before Breakfast.   2/10/2019 at Unknown time   • guaiFENesin (ROBITUSSIN) 100 MG/5ML syrup Take 200 mg by mouth 3 (Three) Times a Day As Needed for Cough.      • hydrocortisone (ANUSOL-HC) 25 MG suppository Insert 25 mg into the rectum 2 (Two) Times a Day As Needed for Hemorrhoids.   More than a month at Unknown time   • ipratropium-albuterol (DUO-NEB) 0.5-2.5 mg/3 ml nebulizer Take 3 mL by nebulization Every 4 (Four) Hours As Needed for  Wheezing or Shortness of Air. 360 mL  2/10/2019 at 1200   • levoFLOXacin (LEVAQUIN) 500 MG tablet Take 500 mg by mouth Daily.   2/10/2019 at 0600   • levothyroxine (SYNTHROID, LEVOTHROID) 88 MCG tablet Take 88 mcg by mouth Daily.   2/10/2019 at Unknown time   • lithium carbonate 150 MG capsule Take 150 mg by mouth Daily.   2/10/2019 at Unknown time   • Multiple Vitamins-Minerals (ICAPS AREDS 2) capsule Take 1 capsule by mouth 2 (Two) Times a Day.   2/10/2019 at 0900   • neomycin-bacitracin-polymyxin (NEOSPORIN) 5-400-5000 ointment Apply 1 application topically to the appropriate area as directed Daily. Apply to right 5th toenail bed.   2/10/2019 at Unknown time   • NIFEdipine XL (PROCARDIA XL) 60 MG 24 hr tablet Take 60 mg by mouth Daily.   2/10/2019 at Unknown time   • nitroglycerin (NITROSTAT) 0.4 MG SL tablet Place 0.4 mg under the tongue Every 5 (Five) Minutes As Needed for chest pain. Take no more than 3 doses in 15 minutes.   More than a month at Unknown time   • pantoprazole (PROTONIX) 40 MG EC tablet Take 40 mg by mouth Daily.   2/10/2019 at Unknown time   • QUEtiapine (SEROquel) 100 MG tablet Take 100 mg by mouth Every Night.   2/9/2019 at Unknown time     Scheduled Meds:  carvedilol 3.125 mg Oral Q12H   dicyclomine 10 mg Oral BID   ferrous sulfate 325 mg Oral Q48H   And      ferrous sulfate 325 mg Oral Q48H   folic acid 500 mcg Oral Daily   heparin (porcine) 5,000 Units Subcutaneous Q12H   levothyroxine 88 mcg Oral Q AM   lithium carbonate 150 mg Oral Daily   NIFEdipine XL 90 mg Oral Daily   pantoprazole 40 mg Oral Daily   QUEtiapine 100 mg Oral Nightly   sodium chloride 3 mL Intravenous Q12H     Continuous Infusions:  sodium chloride 50 mL/hr Last Rate: 50 mL/hr (02/12/19 0905)          History of Present Illness:   This is an 89-year-old white [facility] weakness, dysarthria, and worsening shortness of breath.  Per report, while she was at home, she was eating breakfast  and went to the restroom and felt weak while on the toilet and had a witnessed syncopal episode by her son.  The son stated that she was unresponsive for an unknown amount of time which prompted him to send her to [facility] ED.  Patient had associated nonproductive cough, mild nausea, but denied any fever, chills, chest pain, abdominal pain, edema, or vomiting.  Her chest x-ray demonstrated a small right pleural effusion and increased pulmonary vasculature bilaterally.  CT of the head and MRI of the brain showed no acute intracranial abnormality.  She was told that she had blood in her stool even though she has not noticed this.  She denies hemoptysis or hematemesis.  She has some shortness of breath that worsened this morning.  She denies any edema but is having an intermittent cough.  She denies any chest pressure or medications.  She would like to go home because her 72nd wedding anniversary is Friday and she would like to be with her .  She is not seen by a nephrologist.  When she was seen by Dr. Durand in October 2018, she states was told to take her Lasix sparingly if she noticed weight gain. She is resting on oxygen therapy in her chair this morning. She feels that her breathing is a little worse than yesterday. Per RN her coreg had been held due to bradycardia.    Cardiac risk factors: advanced age (older than 55 for men, 65 for women), diabetes mellitus, dyslipidemia, hypertension, male gender and sedentary lifestyle.    Social History     Socioeconomic History   • Marital status:      Spouse name: Not on file   • Number of children: Not on file   • Years of education: Not on file   • Highest education level: Not on file   Social Needs   • Financial resource strain: Not on file   • Food insecurity - worry: Not on file   • Food insecurity - inability: Not on file   • Transportation needs - medical: Not on file   • Transportation needs - non-medical: Not on file   Occupational History   • Not on file  "  Tobacco Use   • Smoking status: Never Smoker   • Smokeless tobacco: Never Used   Substance and Sexual Activity   • Alcohol use: No   • Drug use: No   • Sexual activity: Defer   Other Topics Concern   • Not on file   Social History Narrative   • Not on file     Family History   Problem Relation Age of Onset   • Diabetes Mother    • Heart disease Mother    • Diabetes Father    • Diabetes Sister    • Heart attack Brother    • Heart disease Sister    • Heart disease Sister    • Heart failure Sister        Review of Systems  10 point review of systems was completed, positives outlined in the HPI, and otherwise all other systems are negative.      Objective:       Physical Exam  /51   Pulse 60   Temp 98.1 °F (36.7 °C) (Oral)   Resp 16   Ht 160 cm (63\")   Wt 52.9 kg (116 lb 9.6 oz)   SpO2 98%   BMI 20.65 kg/m²       02/10/19  1319   Weight: 52.9 kg (116 lb 9.6 oz)     Body mass index is 20.65 kg/m².    Intake/Output Summary (Last 24 hours) at 2019 0913  Last data filed at 2019 0904  Gross per 24 hour   Intake 2510 ml   Output 1600 ml   Net 910 ml       General Appearance:  Alert, cooperative, no distress, appears stated age   Head:  Normocephalic, without obvious abnormality, atraumatic   Neck: Supple, symmetrical, trachea midline, no adenopathy, thyroid: not enlarged, symmetric, no tenderness/mass/nodules, no carotid bruit or JVD   Lungs:   Bibasilar rales and expiratory wheezing to auscultation bilaterally, respirations unlabored, 3 L O2 NC   Heart:  Regular rate and rhythm with occasional PVC's, S1, S2 normal, grade 2/6 murmur, no rub or gallop   Abdomen:   Soft, non-tender, no masses, no organomegaly, bowel sounds audible x4   Extremities: No edema, normal range of motion   Pulses: 2+ and symmetric   Skin: Skin color, texture, turgor normal, no rashes or lesions   Neurologic: Normal       Cardiographics:    · EK/10/19:  Sinus bradycardia with frequent premature ventricular complexes  Left " axis deviation  Possible Anterior infarct , age undetermined  ST & T wave abnormality, consider lateral ischemia  Abnormal ECG  When compared with ECG of 18-JAN-2019 18:16,  Sinus rhythm has replaced Wide QRS rhythm  Confirmed by LIZETH MITCHELL MD ([id number]) on 2/11/2019 9:58:01 AM; REVIEWED.    · Echocardiogram 2/11/19 pending; preliminary demonstrated thickening of the aortic valve, trace AR, normal LV cavity size, wall thickness, and contractility, mild to moderate MR, mitral valve ring present, moderate to severe TR, RVSP 66 mmHg.  Tricuspid valve repair with a ring.  Mild NV, no pericardial effusion, left pleural effusion, right pleural effusion    Imaging:    · Chest x-ray 2/10/19:  Heart is enlarged with increased markings seen of  the lung  bases bilaterally with small right pleural effusion. Increased pulmonary  vascularity within the lung fields bilaterally; REVIEWED.    · Renal artery duplex 2/11/19:  Severe distention of the urinary bladder without focal thickening or irregularity otherwise noted.  No hydronephrosis or acute sonographic findings of the bilateral kidneys.    · MRI brain 2/10/19:  IMPRESSION:  There is mucosal thickening involving the sphenoid sinuses  with fluid in the mastoid air cells bilaterally. Atrophy identified  throughout the brain with chronic small vessel ischemic changes seen  scattered throughout the periventricular and subcortical white matter.  No evidence of acute intracranial abnormality.    · CT head 2/10/19:  Paranasal sinusitis with mild atrophy and chronic changes  seen of the brain and no acute intracranial abnormality identified.    Lab Review:   Results from last 7 days   Lab Units 02/12/19  0502 02/11/19  0505 02/10/19  1343   SODIUM mmol/L 136 136 131*   POTASSIUM mmol/L 3.7 3.6 4.3   CHLORIDE mmol/L 105 101 98*   CO2 mmol/L 26.0 29.0 31.0   BUN mg/dL 53* 62* 69*   CREATININE mg/dL 1.93* 2.05* 2.31*   GLUCOSE mg/dL 67* 36* 142*   CALCIUM mg/dL 8.4* 8.8 8.7     Results  from last 7 days   Lab Units 02/12/19  0502 02/11/19  0505 02/10/19  1343   WBC 10*3/mm3 6.38 7.14 6.61   HEMOGLOBIN g/dL 7.6* 8.4* 8.7*   HEMATOCRIT % 24.8* 27.6* 28.6*   PLATELETS 10*3/mm3 142* 177 178     Results from last 7 days   Lab Units 02/11/19  0505   CHOLESTEROL mg/dL 118   TRIGLYCERIDES mg/dL 94   HDL CHOL mg/dL 41   LDL CHOL mg/dL 67     Results from last 7 days   Lab Units 02/11/19  0505   HEMOGLOBIN A1C % 5.70*     · BNP 1029  · IVFS =NS 50ml/hr      Assessment:   Patient with severe anemia, CHF exacerbation, and marginal renal function in the setting of severe pulmonary hypertension on echocardiogram and previous mitral valve and tricuspid valve repairs. Suspect CHF will not improve until renal dysfunction and anemia are improved. She primarily has heart failure with preserved EF which has been exacerbated by renal impairment and progressive anemia.  No current evidence for acute coronary syndrome and doubt underlying subacute bacterial endocarditis.  I'm uncertain whether doing weekly echocardiograms will improve her long-term clinical course and recovery.  We will attempt gentle diuresis while anemia undergoes evaluation and treatment; suspect occult GI bleeding may have precipitated her admission.     Plan:   1. Hold anticoagulation  2. Anemia needs workup; marked decline in Hgb today  3. Defer MPS/LHC  4. Strict I/O, daily weights  5. Review final echocardiogram results after interpretation by Dr. Stein who was ordered to read the study  6. IV bumex 1 mg bid  7. Daily renal function panel  8. Would recommend discontinuing her NS drip  9. Discontinue Coreg in view of bradycardia and observe heart rhythm and rate  10. Dr. Durand will follow up in am    Discussed with patient and daughter in room.    Scribed for Agustín Lane MD by Leilani Loredo, RADHA. 2/12/2019  10:04 AM     I, Agustín Lane MD, [facility], personally performed the services described in this documentation as scribed by the  above named individual in my presence, and it is both accurate and complete.

## 2019-02-12 NOTE — PLAN OF CARE
"Problem: Fall Risk (Adult)  Goal: Identify Related Risk Factors and Signs and Symptoms  Outcome: Outcome(s) achieved Date Met: 02/12/19    Goal: Absence of Fall  Outcome: Ongoing (interventions implemented as appropriate)      Problem: Patient Care Overview  Goal: Plan of Care Review  Outcome: Ongoing (interventions implemented as appropriate)   02/12/19 2817   OTHER   Outcome Summary Pt states \"I'm ready to go home.\" IVF stopped, one dose of IV bumex given, remains on 3L NC, blood sugars better today, WOC re-assessed RLE wound, continue to monitor, home with hospice when ready   Coping/Psychosocial   Plan of Care Reviewed With patient   Plan of Care Review   Progress no change       Problem: Skin Injury Risk (Adult)  Goal: Identify Related Risk Factors and Signs and Symptoms  Outcome: Outcome(s) achieved Date Met: 02/12/19    Goal: Skin Health and Integrity  Outcome: Ongoing (interventions implemented as appropriate)      Problem: Palliative Care (Adult)  Goal: Identify Related Risk Factors and Signs and Symptoms  Outcome: Outcome(s) achieved Date Met: 02/12/19    Goal: Maximized Comfort  Outcome: Ongoing (interventions implemented as appropriate)    Goal: Enhanced Quality of Life  Outcome: Ongoing (interventions implemented as appropriate)      Problem: Hospitalized Acutely Ill Older (Adult)  Goal: Signs and Symptoms of Listed Potential Problems Will be Absent, Minimized or Managed (Hospitalized Acutely Ill Older)  Outcome: Ongoing (interventions implemented as appropriate)        "

## 2019-02-12 NOTE — CONSULTS
Rodrigo Martínez MD  Consulting physician: SALUD Saldana  Reason for referral: goc discussion, ACP, symptom management  Chief Complaint   Patient presents with   • Seizures     HPI:  89 y.o. female with a history CKD, DM, CVA, anemia, hypothyroidism, pulmonary HTN, severe VHD s/p valve replacement, CHF, dementia presents to the ED from [facility] with complaints of generalized weakness,  on 2/10/19.  Patient has dysarthria and is currently at baseline per her son. Family reports that she had the flu in January and has had worsening generalized weakness.  She does have chronic diarrhea and is treated with imodium at home.  Today after eating breakfast she went to the bathroom, felt weak while on the toilet and had a witnessed syncopal episode per her son.  Son states that she was unresponsive for an unknown time which prompted them to send her to the ED.  Patient reports having a nonproductive cough, occasional shortness of air, and mild nausea.  She denies fever, chills, chest pain, abdominal pain, vomiting, dysuria, edema, or any other complaints at this time.  Chest xray shows small right pleural effusion, and increased pulmonary vascularity within the lung fields bilaterally.  CT of the head shows no acute intracranial abnormality.  MRI of the brain shows no evidence of acute intracranial abnormality.    .  Symptoms:   Patient denies any pain, nausea, dyspnea or anxiety. Sleeping well at night.  Daughter reports diarrhea has subsided.   Large formed BM this afternoon.  Advance care planning discussed: yes  Code Status: Reviewed with patient and daughter, Alyce. Previous decision for no cpr, no intubation  Current Code Status     Date Active Code Status Order ID Comments User Context       2/10/2019 20:16 CPR [id number]  Diana Murdock APRN Inpatient       Questions for Current Code Status     Question Answer Comment    Code Status CPR     Medical Interventions (Level of Support Prior to Arrest) Full      Level Of Support Discussed With Patient         Advance Directive: none on medical record  Surrogate decision maker:  Spouse and children  Past Medical History:   Diagnosis Date   • Anemia    • Anxiety    • Arthritis    • Arthritis    • Depression    • Diabetes mellitus (CMS/HCC)    • Stroke (CMS/HCC)     x4 over a span of about 30 years   • Thyroid disorder    • Ulcer      Past Surgical History:   Procedure Laterality Date   • APPENDECTOMY     • ARTERIAL BYPASS SURGERY  2007   • BACK SURGERY     • CARDIAC CATHETERIZATION  2007   • CATARACT EXTRACTION Bilateral    •  SECTION      x4    • CHOLECYSTECTOMY     • COLONOSCOPY  2016   • CORONARY ANGIOPLASTY WITH STENT PLACEMENT     • HEMORRHOIDECTOMY      x2   • HYSTERECTOMY     • RESECTION RIBS EXTRAPLEURAL         Reviewed current scheduled and prn medications for route, type, dose and frequency.    Current Facility-Administered Medications   Medication Dose Route Frequency Provider Last Rate Last Dose   • acetaminophen (TYLENOL) tablet 650 mg  650 mg Oral Q6H PRN Diana Murdock APRRADHA       • bumetanide (BUMEX) injection 1 mg  1 mg Intravenous Q12H Leilani Loredo, APRN       • dextrose (D50W) 25 g/ 50mL Intravenous Solution 25 g  25 g Intravenous Q15 Min PRN Diana Murdock APRN       • dextrose (GLUTOSE) oral gel 15 g  15 g Oral Q15 Min PRN Diana Murdock APRN       • dicyclomine (BENTYL) capsule 10 mg  10 mg Oral BID Diana Murdock APRN   10 mg at 19   • ferric gluconate (FERRLECIT) 125 mg in sodium chloride 0.9 % 110 mL IVPB  125 mg Intravenous Daily Jenn Saldana II, DO       • folic acid (FOLVITE) tablet 500 mcg  500 mcg Oral Daily Diana Murdock APRN   500 mcg at 19   • glucagon (human recombinant) (GLUCAGEN DIAGNOSTIC) injection 1 mg  1 mg Subcutaneous PRN Diana Murdock, APRN       • guaiFENesin (ROBITUSSIN) 100 MG/5ML syrup 200 mg  200 mg Oral TID PRN Diana Murdock, APRN       •  heparin (porcine) 5000 UNIT/ML injection 5,000 Units  5,000 Units Subcutaneous Q12H Diana Murdock APRN   5,000 Units at 02/12/19 0903   • ipratropium-albuterol (DUO-NEB) nebulizer solution 3 mL  3 mL Nebulization Q4H PRN Diana Murdock APRN       • levothyroxine (SYNTHROID, LEVOTHROID) tablet 88 mcg  88 mcg Oral Q AM Diana Murdock APRN   88 mcg at 02/12/19 0543   • lithium carbonate capsule 150 mg - PATIENT SUPPLIED  150 mg Oral Daily Shana, Jenn M II, DO   150 mg at 02/12/19 0903   • NIFEdipine XL (PROCARDIA XL) 24 hr tablet 90 mg  90 mg Oral Daily Shana, Jenn M II, DO   90 mg at 02/12/19 0903   • nitroglycerin (NITROSTAT) SL tablet 0.4 mg  0.4 mg Sublingual Q5 Min PRN Diana Murdock APRN       • ondansetron (ZOFRAN) tablet 4 mg  4 mg Oral Q6H PRN Diana Murdock APRN        Or   • ondansetron (ZOFRAN) injection 4 mg  4 mg Intravenous Q6H PRN Diana Murdock APRN       • pantoprazole (PROTONIX) EC tablet 40 mg  40 mg Oral Daily Diana Murdock APRN   40 mg at 02/12/19 0902   • QUEtiapine (SEROquel) tablet 100 mg  100 mg Oral Nightly Diana Murdock APRN   100 mg at 02/11/19 2009   • sodium chloride 0.9 % flush 10 mL  10 mL Intravenous PRN Ede Rios MD       • sodium chloride 0.9 % flush 3 mL  3 mL Intravenous Q12H Diana Murdock APRN       • sodium chloride 0.9 % flush 3-10 mL  3-10 mL Intravenous PRN Diana Murdock APRN            •  acetaminophen  •  dextrose  •  dextrose  •  glucagon (human recombinant)  •  guaiFENesin  •  ipratropium-albuterol  •  nitroglycerin  •  ondansetron **OR** ondansetron  •  sodium chloride  •  sodium chloride  Allergies   Allergen Reactions   • Penicillins Hives   • Percodan [Oxycodone-Aspirin]      Headaces     Family History   Problem Relation Age of Onset   • Diabetes Mother    • Heart disease Mother    • Diabetes Father    • Diabetes Sister    • Heart attack Brother    • Heart disease Sister    •  "Heart disease Sister    • Heart failure Sister      Social History     Socioeconomic History   • Marital status:      Spouse name: Not on file   • Number of children: Not on file   • Years of education: Not on file   • Highest education level: Not on file   Social Needs   • Financial resource strain: Not on file   • Food insecurity - worry: Not on file   • Food insecurity - inability: Not on file   • Transportation needs - medical: Not on file   • Transportation needs - non-medical: Not on file   Occupational History   • Not on file   Tobacco Use   • Smoking status: Never Smoker   • Smokeless tobacco: Never Used   Substance and Sexual Activity   • Alcohol use: No   • Drug use: No   • Sexual activity: Defer   Other Topics Concern   • Not on file   Social History Narrative   • Not on file     Review of Systems - +/- per HPI and symptom review.    PPS: 50%  /51   Pulse 60   Temp 98.1 °F (36.7 °C) (Oral)   Resp 18   Ht 160 cm (63\")   Wt 52.9 kg (116 lb 9.6 oz)   SpO2 97%   BMI 20.65 kg/m²   52.9 kg (116 lb 9.6 oz) Body mass index is 20.65 kg/m².  Intake & Output (last day)       02/11 0701 - 02/12 0700 02/12 0701 - 02/13 0700    P.O. 960 120    I.V. (mL/kg) 1173 (22.2) 947 (17.9)    Total Intake(mL/kg) 2133 (40.3) 1067 (20.2)    Urine (mL/kg/hr) 1100 (0.9) 500 (2.4)    Total Output 1100 500    Net +1033 +567          Urine Unmeasured Occurrence 2 x           Physical Exam:  General Appearance: No acute distress, frail  Head: Normocephalic without obvious abnormality, atraumatic  Eyes: Conjunctivae and sclerae normal, no icterus, JASON  Throat: No oral lesions, no thrush, oral mucosa moist  Neck: No adenopathy, supple trachea, midline  Back: No kyphosis present, no skin lesions, erythema or scars, no tenderness midline  Lungs: Clear to auscultation, respirations regular, even and non-labored  Heart: Regular rhythm and normal rate, normal S1  Abdomen: Normal bowel sounds, soft, non-distended, " non-tender  Extremities: Moves all extremities, no redness, no cyanosis, no edema, dressing applied to RLE, dry and intact  Pulses: Distal pulses palpable and equal bilaterally  Skin: Warm and dry  Neurological: Awake, no myoclonus, equal hand  and good strength, able to lift lower extremities off bed, stand with walker    Reviewed labs and diagnostic results.  Lab Results   Component Value Date    HGBA1C 5.70 (H) 02/11/2019     Results from last 7 days   Lab Units 02/12/19  0502   WBC 10*3/mm3 6.38   HEMOGLOBIN g/dL 7.6*   HEMATOCRIT % 24.8*   PLATELETS 10*3/mm3 142*     Results from last 7 days   Lab Units 02/12/19  0502  02/10/19  1343   SODIUM mmol/L 136   < > 131*   POTASSIUM mmol/L 3.7   < > 4.3   CHLORIDE mmol/L 105   < > 98*   CO2 mmol/L 26.0   < > 31.0   BUN mg/dL 53*   < > 69*   CREATININE mg/dL 1.93*   < > 2.31*   CALCIUM mg/dL 8.4*   < > 8.7   BILIRUBIN mg/dL  --   --  0.5   ALK PHOS U/L  --   --  63   ALT (SGPT) U/L  --   --  8   AST (SGOT) U/L  --   --  8   GLUCOSE mg/dL 67*   < > 142*    < > = values in this interval not displayed.     Results from last 7 days   Lab Units 02/12/19  0502   SODIUM mmol/L 136   POTASSIUM mmol/L 3.7   CHLORIDE mmol/L 105   CO2 mmol/L 26.0   BUN mg/dL 53*   CREATININE mg/dL 1.93*   GLUCOSE mg/dL 67*   CALCIUM mg/dL 8.4*     Imaging Results (last 72 hours)     Procedure Component Value Units Date/Time    US Renal Bilateral [[id number]] Collected:  02/11/19 1000     Updated:  02/11/19 1307    Narrative:       EXAMINATION: US RENAL, BILATERAL-02/11/2019:     INDICATION: TIN on CKD; R41.82-Altered mental status, unspecified;  R47.1-Dysarthria and anarthria; R55-Syncope and collapse.      TECHNIQUE: Ultrasound of kidneys and urinary bladder.     COMPARISON: NONE.     FINDINGS:  The right kidney measures 9.5 cm in length without evidence  of hydronephrosis, contour deforming mass or obvious calculi.     The left kidney measures 8.5 cm in length without evidence  of  hydronephrosis, contour deforming mass or obvious calculi.     The urinary bladder is severely distended otherwise, grossly  unremarkable.       Impression:       1.  Severe distention of the urinary bladder without focal thickening or  irregularity otherwise noted.  2.  No hydronephrosis or acute sonographic findings of the bilateral  kidneys.     D:  02/11/2019  E:  02/11/2019            This report was finalized on 2/11/2019 1:05 PM by Dr. James Obando.       MRI Brain Without Contrast [[id number]] Collected:  02/10/19 1759     Updated:  02/11/19 0902    Narrative:       EXAMINATION: MRI BRAIN WO CONTRAST - 2/10/2019     INDICATION: R41.82-Altered mental status, unspecified; R47.1-Dysarthria  and anarthria; R55-Syncope and collapse. Mental status change.         TECHNIQUE: Routine multiplanar imaging is obtained of the brain without  the administration of gadolinium contrast.     COMPARISON: NONE     FINDINGS: There is no evidence of restricted diffusion to suggest  evidence of an acute ischemic insult. Flow voids are preserved in the  major intracranial vessels. Visualized paranasal sinuses are grossly  clear. There is fluid seen in the mastoid air cells bilaterally. There  is atrophy identified of the brain. Signal changes are seen scattered  throughout the periventricular and  subcortical white matter suggesting  evidence of chronic small vessel ischemic change. There is no hemorrhage  or hydrocephalus. There is no mass, mass effect, or midline shift. No  hemorrhage or hydrocephalus. No abnormal extra-axial fluid collections  identified. Pituitary and sella reveal no gross abnormality.  Craniovertebral junction is preserved       Impression:       There is mucosal thickening involving the sphenoid sinuses  with fluid in the mastoid air cells bilaterally. Atrophy identified  throughout the brain with chronic small vessel ischemic changes seen  scattered throughout the periventricular and subcortical white  matter.  No evidence of acute intracranial abnormality.      DICTATED:   2/10/2019  EDITED/ls :   2/10/2019      This report was finalized on 2/11/2019 9:00 AM by Dr. Maribel Hale MD.       CT Head Without Contrast [[id number]] Collected:  02/10/19 1531     Updated:  02/11/19 0902    Narrative:       EXAMINATION: CT HEAD WO CONTRAST - 2/10/2019     INDICATION: Mental status change.     TECHNIQUE: Multiple axial CT imaging is obtained of the head from skull  base to skull vertex without the administration of intravenous contrast.     The radiation dose reduction device was turned on for each scan per the  ALARA (As Low as Reasonably Achievable) protocol.     COMPARISON: 6/8/2018     FINDINGS: Mild atrophy identified the brain. Minimal low-density area  seen in the periventricular and subcortical white matter suggesting  chronic small vessel ischemic change. No hemorrhage or hydrocephalus. No  mass, mass effect, or midline shift. No abnormal extra-axial fluid  collections identified. The bony structures reveal no evidence of  osseous abnormality. The visualized paranasal sinuses are with mucosal  thickening in the sphenoid sinuses and ethmoid air cells. The mastoid  air cells are patent.       Impression:       Paranasal sinusitis with mild atrophy and chronic changes  seen of the brain and no acute intracranial abnormality identified.     DICTATED:   2/10/2019  EDITED/ls :   2/10/2019         This report was finalized on 2/11/2019 9:00 AM by Dr. Maribel Hale MD.       XR Chest 1 View [[id number]] Collected:  02/10/19 1557     Updated:  02/11/19 0901    Narrative:          EXAMINATION: XR CHEST 1 VW - 2/10/2019     INDICATION: Weakness, dizziness, altered mental status.     COMPARISON: 1/22/2019     FINDINGS: Portable chest reveals the heart to be enlarged. Large hiatal  hernia identified. Small right pleural effusion with mild increased  markings at the right lung base. Some improvement seen in aeration  of  the right upper lung field. Increased markings as well at the left lung  base.           Impression:       Heart is enlarged with increased markings seen of  the lung  bases bilaterally with small right pleural effusion. Increased pulmonary  vascularity within the lung fields bilaterally.     DICTATED:   2/10/2019  EDITED/ls :   2/10/2019      This report was finalized on 2/11/2019 8:59 AM by Dr. Maribel Hale MD.         ECHO:  Echocardiogram Findings - not final  2/10/19    Left Ventricle Normal left ventricular cavity size and wall thickness noted. All left ventricular wall segments contract normally.   Right Ventricle Normal right ventricular cavity size noted.   Left Atrium Normal left atrial size noted.   Right Atrium Normal right atrial size noted.   Aortic Valve The valve appears trileaflet. The aortic valve is abnormal in structure. The valve exhibits sclerosis. There is thickening of the aortic valve. Trace aortic valve regurgitation is present.   Mitral Valve Mild-to-moderate mitral valve regurgitation is present. There is a mitral valve ring present.   Tricuspid Valve There is evidence of previous tricuspid valve repair. Moderate to severe tricuspid valve regurgitation is present. Estimated right ventricular systolic pressure from tricuspid regurgitation is markedly elevated (>55 mmHg). Calculated right ventricular systolic pressure from tricuspid regurgitation is 66 mmHg. TV repair with a ring..   Pulmonic Valve The pulmonic valve is structurally normal. There is mild pulmonic valve regurgitation present.   Greater Vessels No dilation of the aortic root is present. No dilation of the sinuses of Valsalva is present.   Pericardium There is no evidence of pericardial effusion. There is a left pleural effusion. There is a right pleural effusion.     ECHO:  1/20/19  Interpretation Summary      · There is mild RV enlargement and severe tricuspid regurgitation with an estimated RV systolic pressure  "of 69mmHg c/w severe pulmonary artery hypertension.  · There is mild aortic valve sclerosis.  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  · The estimated left ventricular ejection fraction is 46%-50%.  · Agitated saline reveals no evidence of R>L Intracardiac shunting.  · There are no other important findings on this study         Impression: 89 y.o. female with valvular heart disease, severe pulmonary hypertension, DM type2, acute on chronic systolic heart failure with pleural effusion  Plan:   Dyspnea - continue to monitor    Generalized weakness - patient is able to transfer with minimal assistance with use of walker.     Goals of care - met with patient and daughter, Alyce, at bedside.   Patient wants to return home before Friday if possible, 72nd wedding anniversary on Friday and her birthday on Monday.  Patient has not been at home for last month with rehab stay.   Requesting to return home with therapy services per home health.     Reviewed different choices for plan of care; home health vs home hospice services.  Informed that may reach out to hospice if patient becomes more symptomatic at home or is unable to continue therapy services or once those services are completed.   Patient recognizes that she is nearing the end of her life. Reviewed code status, stated already decided for no cpr, no intubation.   Reviewed use of EMS/DNR form at home, requesting to complete one. Requested  to assist.     Palliative will continue to provide support to patient and family.     Edie Lopez, APRN  [phone]  02/12/19  10:56 AM      Time: 60 minutes spent reviewing medical and medication records, assessing and examining patient, discussing with patient and family, answering questions, formulating a plan and documentation of care. > 50% time spent face to face     "

## 2019-02-12 NOTE — PROGRESS NOTES
[facility] Medicine Services  PROGRESS NOTE    Patient Name: Erma West  : 1929  MRN: [id number]    Date of Admission: 2/10/2019  Length of Stay: 1  Primary Care Physician: Rodrigo Martínez MD    Subjective   Subjective     CC: f/u AMS    HPI: Had small degree of asymptomatic hypoglycemia overnight. Very alert this am. Has no specific complaints but feels that she wants to go home rather than returning to rehab.    Review of Systems  Gen- No fevers, chills  CV- No chest pain, palpitations  GI- No N/V/D, abd pain    Otherwise ROS is negative except as mentioned in the HPI.    Objective   Objective     Vital Signs:   Temp:  [97.1 °F (36.2 °C)-98.1 °F (36.7 °C)] 98.1 °F (36.7 °C)  Heart Rate:  [52-64] 60  Resp:  [15-18] 18  BP: (100-149)/(44-87) 128/51        Physical Exam:  Constitutional: No acute distress, awake, alert, looks better  HENT: NCAT, mucous membranes moist  Respiratory: Normal WOB, crackles in bases  Cardiovascular: RRR, II/VI systolic murmur  Gastrointestinal: Positive bowel sounds, soft, nontender, nondistended  Musculoskeletal: No edema, right leg skin tear with blood which has saturated bandage  Psychiatric: Appropriate affect, cooperative  Neurologic: Oriented x 3, strength symmetric in all extremities, Cranial Nerves grossly intact to confrontation, speech clear  Skin: No rashes    Results Reviewed:  I have personally reviewed current lab, radiology, and data and agree.    Results from last 7 days   Lab Units 19  0502 19  0505 02/10/19  1343   WBC 10*3/mm3 6.38 7.14 6.61   HEMOGLOBIN g/dL 7.6* 8.4* 8.7*   HEMATOCRIT % 24.8* 27.6* 28.6*   PLATELETS 10*3/mm3 142* 177 178     Results from last 7 days   Lab Units 19  0502 19  0505 02/10/19  1343 02/10/19  1340   SODIUM mmol/L 136 136 131*  --    POTASSIUM mmol/L 3.7 3.6 4.3  --    CHLORIDE mmol/L 105 101 98*  --    CO2 mmol/L 26.0 29.0 31.0  --    BUN mg/dL 53* 62* 69*  --     CREATININE mg/dL 1.93* 2.05* 2.31*  --    GLUCOSE mg/dL 67* 36* 142*  --    CALCIUM mg/dL 8.4* 8.8 8.7  --    ALT (SGPT) U/L  --   --  8  --    AST (SGOT) U/L  --   --  8  --    TROPONIN I ng/mL  --   --   --  0.01     Estimated Creatinine Clearance: 16.5 mL/min (A) (by C-G formula based on SCr of 1.93 mg/dL (H)).    No results found for: BNP    Microbiology Results Abnormal     Procedure Component Value - Date/Time    Eosinophil Smear - Urine, Urine, Clean Catch [[id number]]  (Normal) Collected:  02/11/19 1442    Lab Status:  Final result Specimen:  Urine, Clean Catch Updated:  02/11/19 1612     Eosinophil Smear 0 % EOS/100 Cells     Narrative:       No eosinophil seen          Imaging Results (last 24 hours)     Procedure Component Value Units Date/Time    US Renal Bilateral [[id number]] Collected:  02/11/19 1000     Updated:  02/11/19 1307    Narrative:       EXAMINATION: US RENAL, BILATERAL-02/11/2019:     INDICATION: TIN on CKD; R41.82-Altered mental status, unspecified;  R47.1-Dysarthria and anarthria; R55-Syncope and collapse.      TECHNIQUE: Ultrasound of kidneys and urinary bladder.     COMPARISON: NONE.     FINDINGS:  The right kidney measures 9.5 cm in length without evidence  of hydronephrosis, contour deforming mass or obvious calculi.     The left kidney measures 8.5 cm in length without evidence of  hydronephrosis, contour deforming mass or obvious calculi.     The urinary bladder is severely distended otherwise, grossly  unremarkable.       Impression:       1.  Severe distention of the urinary bladder without focal thickening or  irregularity otherwise noted.  2.  No hydronephrosis or acute sonographic findings of the bilateral  kidneys.     D:  02/11/2019  E:  02/11/2019            This report was finalized on 2/11/2019 1:05 PM by Dr. James Obando.             Results for orders placed during the hospital encounter of 01/18/19   Adult Transthoracic Echo Complete W/ Cont if Necessary Per Protocol     "Narrative · There is mild RV enlargement and severe tricuspid regurgitation with an   estimated RV systolic pressure of 69mmHg c/w severe pulmonary artery   hypertension.  · There is mild aortic valve sclerosis.  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  · The estimated left ventricular ejection fraction is 46%-50%.  · Agitated saline reveals no evidence of R>L Intracardiac shunting.  · There are no other important findings on this study.          I have reviewed the medications:    Current Facility-Administered Medications:   •  acetaminophen (TYLENOL) tablet 650 mg, 650 mg, Oral, Q6H PRN, Diana Murdock, APRN  •  carvedilol (COREG) tablet 3.125 mg, 3.125 mg, Oral, Q12H, Jenn Saldana M II, DO  •  dextrose (D50W) 25 g/ 50mL Intravenous Solution 25 g, 25 g, Intravenous, Q15 Min PRN, Diana Murdock, APRN  •  dextrose (GLUTOSE) oral gel 15 g, 15 g, Oral, Q15 Min PRN, Diana Murdock, APRN  •  dicyclomine (BENTYL) capsule 10 mg, 10 mg, Oral, BID, Diana Murdock, APRN, 10 mg at 02/12/19 0902  •  ferric gluconate (FERRLECIT) 125 mg in sodium chloride 0.9 % 110 mL IVPB, 125 mg, Intravenous, Daily, Jenn Saldana M II, DO  •  folic acid (FOLVITE) tablet 500 mcg, 500 mcg, Oral, Daily, Diana Murdock APRN, 500 mcg at 02/12/19 0902  •  glucagon (human recombinant) (GLUCAGEN DIAGNOSTIC) injection 1 mg, 1 mg, Subcutaneous, PRN, Diana Murdock, APRN  •  guaiFENesin (ROBITUSSIN) 100 MG/5ML syrup 200 mg, 200 mg, Oral, TID PRN, Diana Murdock, APRN  •  heparin (porcine) 5000 UNIT/ML injection 5,000 Units, 5,000 Units, Subcutaneous, Q12H, Diana Murdock, APRN, 5,000 Units at 02/12/19 0903  •  ipratropium-albuterol (DUO-NEB) nebulizer solution 3 mL, 3 mL, Nebulization, Q4H PRN, Diana Murdock, RADHA  •  levothyroxine (SYNTHROID, LEVOTHROID) tablet 88 mcg, 88 mcg, Oral, Q AM, Diana Murdock, RADHA, 88 mcg at 02/12/19 " 0543  •  lithium carbonate capsule 150 mg - PATIENT SUPPLIED, 150 mg, Oral, Daily, Jenn Saldana M II, DO, 150 mg at 02/12/19 0903  •  NIFEdipine XL (PROCARDIA XL) 24 hr tablet 90 mg, 90 mg, Oral, Daily, Jenn Saldana M II, DO, 90 mg at 02/12/19 0903  •  nitroglycerin (NITROSTAT) SL tablet 0.4 mg, 0.4 mg, Sublingual, Q5 Min PRN, Diana Murdock APRN  •  ondansetron (ZOFRAN) tablet 4 mg, 4 mg, Oral, Q6H PRN **OR** ondansetron (ZOFRAN) injection 4 mg, 4 mg, Intravenous, Q6H PRN, Diana Murdock APRN  •  pantoprazole (PROTONIX) EC tablet 40 mg, 40 mg, Oral, Daily, Diana Murdock APRN, 40 mg at 02/12/19 0902  •  QUEtiapine (SEROquel) tablet 100 mg, 100 mg, Oral, Nightly, Diana Murdock APRN, 100 mg at 02/11/19 2009  •  sodium chloride 0.9 % flush 10 mL, 10 mL, Intravenous, PRN, Ede Rios MD  •  sodium chloride 0.9 % flush 3 mL, 3 mL, Intravenous, Q12H, Diana Murdock APRN  •  sodium chloride 0.9 % flush 3-10 mL, 3-10 mL, Intravenous, PRN, Diana Murdock APRRADHA      Assessment/Plan   Assessment / Plan     Active Hospital Problems    Diagnosis Date Noted   • **Syncope [R55] 02/10/2019   • Acute metabolic encephalopathy [G93.41] 02/11/2019   • Acute renal failure superimposed on chronic kidney disease (CMS/HCC) [N17.9, N18.9] 02/10/2019   • Pleural effusion on right [J90] 02/10/2019   • Hyponatremia [E87.1] 02/10/2019   • Anemia [D64.9] 02/10/2019   • Generalized weakness [R53.1] 02/10/2019   • Dysarthria [R47.1] 02/10/2019   • Pulmonary hypertension, moderate to severe (CMS/HCC) [I27.20] 01/20/2019   • Acute on chronic congestive heart failure (CMS/HCC) [I50.9] 01/18/2019     Echo 1/20/2019:  Interpretation Summary     · There is mild RV enlargement and severe tricuspid regurgitation with an estimated RV systolic pressure of 69mmHg c/w severe pulmonary artery hypertension.  · There is mild aortic valve sclerosis.  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  · The estimated left ventricular ejection fraction is 46%-50%.  · Agitated saline reveals no evidence of R>L Intracardiac shunting.  · There are no other important findings on this study          • CAD (coronary artery disease) [I25.10] 03/31/2018   • Hypothyroidism [E03.9] 03/31/2018   • Type 2 diabetes mellitus with hypoglycemia (CMS/HCC) [E11.649] 03/31/2018   • Valvular heart disease [I38] 03/31/2018       Brief Hospital Course to date:  Erma West is a 89 y.o. female admitted from SNF after syncopal episode. Upon arrival she was noted to have TIN/CKD.     A/P:     Metabolic Encephalopathy  Hypoglycemia w/ syncope  --Better. Likely related to hypoglycemia in setting of sulfonurea use and TIN. Hold offending meds and hydrate so as to resolve TIN.  --Has mild persistent hypoglycemia likely related to persistent TIN as above. Give snack at bedtime, d/w nursing. Will plan to stop sulfonurea indefinitely.     TIN/CKD III, likely intra-vascular volume depletion  --TIN/CKD III likely due to increasing diuresis at [facility]. It is better since admission with IVF. When she left our facility her creatinine was 1.05 and it has been steadily increasing since 2/1 coiniciding with increasing PO lasix dosing. Has received IV fluids. Will stop those today now that she is eating/drinking better.     Hypoxia  A/C systolic CHF w/ pleural effusion  VHD  Pulm HTN  --Also per daughter her O2 requirement has increased at [facility], which is supported by CXR. Difficult situation given renal dysfunction as above in setting of known A/C CHF, VHD, pulmonary HTN. Will hold lasix as above.  --Doesn't appear that her right sided effusion large enough to tap at this time. Follow serial CXR given that we are hydrating her. May consider thoracentesis if worsens.  --Patient follows with Dr. Durand and was last seen approx 6 months ago. At that time had nl EF, now reduced which is particularly " troublesome in light of severe VHD and difficulty w/ diuresis. Likely no good options aside from medical mgmt.  --D/W cardiology PA-C today. Ultimately we need to settle on a PO medical regimen to help keep her out of the hospital.    Iron deficiency anemia  --Based on prior studies. Was on every other day PO iron at [facility] [place]. Will hold and give IV iron for 3 doses.  --No signs of bleeding. Patient and family not wishing for aggressive eval regarding anemia at this time.     Weakness, debility  GOC  --PT/OT. CM.  --Patient wants to go home at d/c rather than returning to rehab which I think is very reasonable. Will ask palliative to see to assist with GOC. I think her views may ultimately be in line with hospice care.      DVT Prophylaxis: SQH     Disposition: I expect the patient to be discharged either home vs SNF once above resolved.      CODE STATUS:   Code Status and Medical Interventions:   Ordered at: 02/10/19 2016     Level Of Support Discussed With:    Patient     Code Status:    CPR     Medical Interventions (Level of Support Prior to Arrest):    Full         Electronically signed by Jenn Saldana II, DO, 02/12/19, 9:55 AM.

## 2019-02-12 NOTE — PLAN OF CARE
Problem: Fall Risk (Adult)  Goal: Absence of Fall  Outcome: Ongoing (interventions implemented as appropriate)      Problem: Patient Care Overview  Goal: Plan of Care Review  Outcome: Ongoing (interventions implemented as appropriate)   02/12/19 9215   OTHER   Outcome Summary Pt rested well throughout most of shift. Blood sugar checked around 0400 due to hypoglycemia yesterday. Hypoglyemic again at 48, pt alert and sugar came up with PO intake. Leg wound continuing to bleed (controlled), dressing changed x2. No other complaints at this time. Continue to monitor    Coping/Psychosocial   Plan of Care Reviewed With patient   Plan of Care Review   Progress no change       Problem: Skin Injury Risk (Adult)  Goal: Skin Health and Integrity  Outcome: Ongoing (interventions implemented as appropriate)

## 2019-02-12 NOTE — PROGRESS NOTES
Continued Stay Note   Story     Patient Name: Erma West  MRN: [id number]  Today's Date: 2/12/2019    Admit Date: 2/10/2019    Discharge Plan     Row Name 02/12/19 1422       Plan    Plan  Home with home health    Patient/Family in Agreement with Plan  yes    Plan Comments  Attempted to speak with patient and daughter today.  Patient and family sleeping.  Palliaitive care to see patient today to discuss goals of care for home.  Patient and daughter would like to return home when medically ready with home health.  Patient would like [facility] home health upon discharge.  [facility] health has been notified of patient admission. They will follow patient while in the hospital  Will notify HH when patient is ready for discharge.  Case management will continue to follow for discharge planning needs.      Final Discharge Disposition Code  06 - home with home health care        Discharge Codes    No documentation.             Laura Luo RN

## 2019-02-12 NOTE — PLAN OF CARE
Problem: Palliative Care (Adult)  Intervention: Promote Informed Decision Making and Goal Setting   02/12/19 1228   Coping/Psychosocial Interventions   Life Transition/Adjustment palliative care initiated     Intervention: Support/Optimize Psychosocial Response   02/12/19 1228   Psychosocial Support   Family/Support System Care self-care encouraged;support provided       Goal: Identify Related Risk Factors and Signs and Symptoms  Outcome: Ongoing (interventions implemented as appropriate)   02/12/19 1228   Palliative Care (Adult)   Palliative Care: Related Risk Factors advanced age;chronic illness;condition is progressive;worsening symptoms   Palliative Care: Signs and Symptoms fatigue;other (see comments)     Goal: Maximized Comfort  Outcome: Ongoing (interventions implemented as appropriate)   02/12/19 1228   Palliative Care (Adult)   Maximized Comfort making progress toward outcome     Goal: Enhanced Quality of Life  Outcome: Ongoing (interventions implemented as appropriate)   02/12/19 1228   Palliative Care (Adult)   Enhanced Quality of Life making progress toward outcome

## 2019-02-13 NOTE — PLAN OF CARE
Problem: Fall Risk (Adult)  Goal: Absence of Fall  Outcome: Ongoing (interventions implemented as appropriate)      Problem: Patient Care Overview  Goal: Plan of Care Review  Outcome: Ongoing (interventions implemented as appropriate)   02/13/19 1804   OTHER   Outcome Summary Pt not feeling well today with distended abdomen, IV bumex given today, bumped to 4L NC, some chest pain this afternoon requiring 2 doses of nitro, continue to monitor, home soon   Coping/Psychosocial   Plan of Care Reviewed With patient   Plan of Care Review   Progress declining       Problem: Palliative Care (Adult)  Goal: Maximized Comfort  Outcome: Ongoing (interventions implemented as appropriate)    Goal: Enhanced Quality of Life  Outcome: Ongoing (interventions implemented as appropriate)      Problem: Hospitalized Acutely Ill Older (Adult)  Goal: Signs and Symptoms of Listed Potential Problems Will be Absent, Minimized or Managed (Hospitalized Acutely Ill Older)  Outcome: Ongoing (interventions implemented as appropriate)

## 2019-02-13 NOTE — PROGRESS NOTES
Continued Stay Note  [facility]     Patient Name: Erma West  MRN: [id number]  Today's Date: 2/13/2019    Admit Date: 2/10/2019    Discharge Plan     Row Name 02/13/19 1503       Plan    Plan  Home with home health and palliative care    Patient/Family in Agreement with Plan  yes    Plan Comments  Spoke with patient's daughter at bedside.  Patient planning to discharge home as soon as possible.  Patient planning to be home by Friday 2/15 for anniversary.  Daughter states that patient has 02 at home with 3-4 full portalbe tanks for discharge, and a home 02 concentrator from [employer].  Spoke with Terese at [facility].  They will accept patient back for home health services.  Order has been placed in [place] for skilled nursing, PT, and OT and faxed along with facesheet and H and P to [facility] to [phone].  Case management will call [facility] at discharge to notify and resume services.  Family has list of agency caregivers that they plan to utilize when they are discharge home.  Daughter states that she has spoken to an agency already and has a caregiver lined up for after discharge.  List of DME companies provided to Daughter.  Daughter will decide after discharge if she would like a hospital bed for her mother.  She does not want equipment at this time, but will contact case management if she decides that she would like at a later date.  Daughter is aware that DME will be private pay rental.  Patient has rolling walker and wheelchair at home.  Daughter states that family will transport in private vehicle at discharge.  Case management will continue to follow for discharge planning needs.      Final Discharge Disposition Code  06 - home with home health care        Discharge Codes    No documentation.             Laura Luo RN

## 2019-02-13 NOTE — DISCHARGE PLACEMENT REQUEST
"Laura Luo  [phone]  Order for home health.  Patient will likely discharge .  Will contact [facility] when patient is discharged home.      Erma Monroe (89 y.o. Female)     Date of Birth Social Security Number Address Home Phone MRN    [id number]  [address] [place] [address] [phone] [id number]    Congregation Marital Status          Gnosticism        Admission Date Admission Type Admitting Provider Attending Provider Department, Room/Bed    2/10/19 Emergency Jenn Saldana II, Jenn Arias II,  [facility] 4H, S465/1    Discharge Date Discharge Disposition Discharge Destination                       Attending Provider:  Jenn Saldana II, DO    Allergies:  Penicillins, Percodan [Oxycodone-aspirin]    Isolation:  None   Infection:  None   Code Status:  No CPR    Ht:  160 cm (63\")   Wt:  52.9 kg (116 lb 9.6 oz)    Admission Cmt:  None   Principal Problem:  Syncope [R55]                 Active Insurance as of 2/10/2019     Primary Coverage     Payor Plan Insurance Group Employer/Plan Group    HUMANA MEDICARE REPLACEMENT HUMANA MEDICARE REPL [id number]     Payor Plan Address Payor Plan Phone Number Payor Plan Fax Number Effective Dates    [address] [address] [phone]  2018 - None Entered    formerly [facility] [id number]       Subscriber Name Subscriber Birth Date Member ID       ERMA MONROE 1929 [id number]                 Emergency Contacts      (Rel.) Home Phone Work Phone Mobile Phone    Alyce Washington (Daughter) -- -- [phone]        [address] [address]  Phone:  [phone]  Fax:   Date: 2019      Ambulatory Referral to Home Health     Patient:  Erma Monroe MRN:  [id number]   [address] [place] [address] :  1929  SSN:    Phone: [phone] Sex:  F      INSURANCE PAYOR PLAN GROUP # SUBSCRIBER ID   Primary:    HUMANA MEDICARE " REPLACEMENT [id number]      Referring Provider Information:  SUSHILA NORTON II Phone: [phone] Fax:       Referral Information:   # Visits:  1 Referral Type: Home Health [42]   Urgency:  Routine Referral Reason: Specialty Services Required   Start Date: Feb 13, 2019 End Date:  To be determined by Insurer   Diagnosis: Altered mental status, unspecified altered mental status type (R41.82 [ICD-10-CM] 780.97 [ICD-9-CM])  Syncope and collapse (R55 [ICD-10-CM] 780.2 [ICD-9-CM])  Impaired mobility and ADLs (Z74.09 [ICD-10-CM] 799.89 [ICD-9-CM])  Acute on chronic congestive heart failure, unspecified heart failure type (CMS/[facility]) (I50.9 [ICD-10-CM] 428.0 [ICD-9-CM])  Type 2 diabetes mellitus with hypoglycemia without coma, unspecified whether long term insulin use (CMS/[facility]) (E11.649 [ICD-10-CM] 250.80 [ICD-9-CM])  Generalized weakness (R53.1 [ICD-10-CM] 780.79 [ICD-9-CM])  Anxiety (F41.9 [ICD-10-CM] 300.00 [ICD-9-CM])  Acute on chronic respiratory failure with hypoxia (CMS/[facility]) (J96.21 [ICD-10-CM] 518.84,799.02 [ICD-9-CM])  CKD (chronic kidney disease) stage 3, GFR 30-59 ml/min (CMS/[facility]) (N18.3 [ICD-10-CM] 585.3 [ICD-9-CM])      Refer to Dept:   Refer to Provider:   Refer to Facility:       Face to Face Visit Date: 2/13/2019  Follow-up Provider for Plan of Care? I treated the patient in an acute care facility and will not continue treatment after discharge.  Follow-up Provider: JOSE MCPHERSON [[id number]]  Reason/Clinical Findings: Syncope  Describe mobility limitations that make leaving home difficult: Impaired functional mobility, balance and gait.  Decreased strength, and generalized weakness  Nursing/Therapeutic Services Requested: Skilled Nursing  Nursing/Therapeutic Services Requested: Physical Therapy  Nursing/Therapeutic Services Requested: Occupational Therapy  Skilled nursing orders: Medication education  Skilled nursing orders: Pain management  Skilled nursing orders: O2  instruction  Skilled nursing orders: Cardiopulmonary assessments  Skilled nursing orders: Neurovascular assessments  PT orders: Therapeutic exercise  PT orders: Gait Training  PT orders: Transfer training  PT orders: Strengthening  PT orders: Home safety assessment  Weight Bearing Status: As Tolerated  Occupational orders: Activities of daily living  Occupational orders: Energy conservation  Occupational orders: Strengthening  Occupational orders: Cognition  Occupational orders: Fine motor  Occupational orders: Home safety assessment     This document serves as a request of services and does not constitute Insurance authorization or approval of services.  To determine eligibility, please contact the members Insurance carrier to verify and review coverage.     If you have medical questions regarding this request for services. Please contact [address] at [phone] between the hours of 8:00am - 5:00pm (Mon-Fri).       Verbal Order Mode: Telephone with readback   Authorizing Provider: Jenn Saldana II, DO  Authorizing Provider's NPI: [id number]     Order Entered By: Laura Luo RN 2019  3:14 PM                    History & Physical      Cheng Garcia DO at 2/10/2019  7:29 PM              [facility] Medicine Services  HISTORY AND PHYSICAL    Patient Name: Erma West  : 1929  MRN: [id number]  Primary Care Physician: Rodrigo Martínez MD  Date of admission: 2/10/2019      Subjective   Subjective     Chief Complaint:  Weakness    HPI:  Erma West is a 89 y.o. female with a history CKD, DM, CVA, anemia, hypothyroidism, pulmonary HTN, presents to the ED with complaints of generalized weakness.  Patient has dysarthria and is currently at baseline per her son.  Family reports that she had the flu in January and has had worsening generalized weakness since.  She does have chronic diarrhea and is treated with imodium at home.  Today after eating  breakfast she went to the bathroom, felt weak while on the toilet and had a witnessed syncopal episode per her son.  Son states that she was unresponsive for an unknown time which prompted them to send her to the ED.  Patient reports having a nonproductive cough, occasional shortness of air, and mild nausea.  She denies fever, chills, chest pain, abdominal pain, vomiting, dysuria, edema, or any other complaints at this time.  Chest xray shows small right pleural effusion, and increased pulmonary vascularity within the lung fields bilaterally.  CT of the head shows no acute intracranial abnormality.  MRI of the brain shows no evidence of acute intracranial abnormality.  Patient is being admitted to the Hospitalist for further evaluation and management.    Review of Systems   Constitutional: Positive for fatigue. Negative for appetite change, chills, diaphoresis and fever.   HENT: Negative.    Eyes: Negative.    Respiratory: Positive for cough and shortness of breath. Negative for choking, chest tightness and wheezing.    Cardiovascular: Negative.    Gastrointestinal: Positive for nausea. Negative for abdominal distention, abdominal pain, blood in stool, constipation, diarrhea and vomiting.   Endocrine: Negative.    Genitourinary: Negative.    Musculoskeletal: Negative.    Skin: Negative.    Allergic/Immunologic: Negative.    Neurological: Positive for syncope, speech difficulty (chronic, currently at baseline) and weakness (generalized). Negative for dizziness, tremors, seizures, facial asymmetry, light-headedness, numbness and headaches.   Hematological: Negative.    Psychiatric/Behavioral: Negative.         Otherwise complete ROS reviewed and is negative except as mentioned in the HPI.    Personal History     Past Medical History:   Diagnosis Date   • Anemia    • Anxiety    • Arthritis    • Arthritis    • Depression    • Diabetes mellitus (CMS/[facility])    • Stroke (CMS/HCC)     x4 over a span of about 30 years   •  Thyroid disorder    • Ulcer        Past Surgical History:   Procedure Laterality Date   • APPENDECTOMY     • ARTERIAL BYPASS SURGERY  2007   • BACK SURGERY     • CARDIAC CATHETERIZATION  2007   • CATARACT EXTRACTION Bilateral    •  SECTION      x4    • CHOLECYSTECTOMY     • COLONOSCOPY  2016   • CORONARY ANGIOPLASTY WITH STENT PLACEMENT     • HEMORRHOIDECTOMY      x2   • HYSTERECTOMY     • RESECTION RIBS EXTRAPLEURAL         Family History: family history includes Diabetes in her father, mother, and sister; Heart attack in her brother; Heart disease in her mother, sister, and sister; Heart failure in her sister. Otherwise pertinent FHx was reviewed and unremarkable.     Social History:  reports that  has never smoked. she has never used smokeless tobacco. She reports that she does not drink alcohol or use drugs.  Social History     Social History Narrative   • Not on file       Medications:    Available home medication information reviewed.  Medications Prior to Admission   Medication Sig Dispense Refill Last Dose   • acetaminophen (TYLENOL) 325 MG tablet Take 2 tablets by mouth Every 6 (Six) Hours As Needed for Mild Pain .      • carvedilol (COREG) 25 MG tablet Take 1 tablet by mouth 2 (Two) Times a Day With Meals.   2/10/2019 at 0900   • Cyanocobalamin (B-12) 1000 MCG/ML kit Inject 1 mL as directed Every 30 (Thirty) Days.   2019   • dicyclomine (BENTYL) 10 MG capsule Take 10 mg by mouth 2 (Two) Times a Day.   2/10/2019 at 0900   • Ferrous Sulfate (IRON) 325 (65 Fe) MG tablet Take 325 mg by mouth See Admin Instructions. Take 1 tab BID every other day   2019 at Unknown time   • folic acid (FOLVITE) 1 MG tablet Take 0.5 tablets by mouth Daily.   2/10/2019 at Unknown time   • furosemide (LASIX) 40 MG tablet Take 40 mg by mouth Daily.   2/10/2019 at Unknown time   • glimepiride (AMARYL) 1 MG tablet Take 1 mg by mouth Every Morning Before Breakfast.   2/10/2019 at Unknown time   • guaiFENesin  (ROBITUSSIN) 100 MG/5ML syrup Take 200 mg by mouth 3 (Three) Times a Day As Needed for Cough.      • hydrocortisone (ANUSOL-HC) 25 MG suppository Insert 25 mg into the rectum 2 (Two) Times a Day As Needed for Hemorrhoids.   More than a month at Unknown time   • ipratropium-albuterol (DUO-NEB) 0.5-2.5 mg/3 ml nebulizer Take 3 mL by nebulization Every 4 (Four) Hours As Needed for Wheezing or Shortness of Air. 360 mL  2/10/2019 at 1200   • levoFLOXacin (LEVAQUIN) 500 MG tablet Take 500 mg by mouth Daily.   2/10/2019 at 0600   • levothyroxine (SYNTHROID, LEVOTHROID) 88 MCG tablet Take 88 mcg by mouth Daily.   2/10/2019 at Unknown time   • lithium carbonate 150 MG capsule Take 150 mg by mouth Daily.   2/10/2019 at Unknown time   • Multiple Vitamins-Minerals (ICAPS AREDS 2) capsule Take 1 capsule by mouth 2 (Two) Times a Day.   2/10/2019 at 0900   • neomycin-bacitracin-polymyxin (NEOSPORIN) 5-400-5000 ointment Apply 1 application topically to the appropriate area as directed Daily. Apply to right 5th toenail bed.   2/10/2019 at Unknown time   • NIFEdipine XL (PROCARDIA XL) 60 MG 24 hr tablet Take 60 mg by mouth Daily.   2/10/2019 at Unknown time   • nitroglycerin (NITROSTAT) 0.4 MG SL tablet Place 0.4 mg under the tongue Every 5 (Five) Minutes As Needed for chest pain. Take no more than 3 doses in 15 minutes.   More than a month at Unknown time   • pantoprazole (PROTONIX) 40 MG EC tablet Take 40 mg by mouth Daily.   2/10/2019 at Unknown time   • QUEtiapine (SEROquel) 100 MG tablet Take 100 mg by mouth Every Night.   2/9/2019 at Unknown time       Allergies   Allergen Reactions   • Penicillins Hives   • Percodan [Oxycodone-Aspirin]      Headaces       Objective   Objective     Vital Signs:   Temp:  [98.1 °F (36.7 °C)] 98.1 °F (36.7 °C)  Heart Rate:  [49-66] 60  Resp:  [16] 16  BP: (121-138)/(55-76) 130/76        Physical Exam   Constitutional: She appears well-developed and well-nourished. No distress.   HENT:   Head:  Normocephalic.   Eyes: Pupils are equal, round, and reactive to light.   Neck: Normal range of motion. Neck supple.   Cardiovascular: Intact distal pulses. Exam reveals no gallop and no friction rub.   Murmur heard.  Bradycardia with irregular beats   Pulmonary/Chest: Effort normal and breath sounds normal. No stridor. No respiratory distress. She has no wheezes. She has no rales.   Abdominal: Soft. Bowel sounds are normal. She exhibits no distension and no mass. There is no tenderness. There is no rebound and no guarding.   Musculoskeletal: Normal range of motion. She exhibits no edema, tenderness or deformity.   Neurological: She is alert.   Alert and oriented to person and place with some word salad during conversation.  Strength is equal bilaterally with generalized weakness.  No sensory deficit.   Skin: Skin is warm and dry. No rash noted. She is not diaphoretic. No erythema. No pallor.   Skin tear right shin   Psychiatric: She has a normal mood and affect. Her behavior is normal.        Results Reviewed:  I have personally reviewed current lab, radiology, and data and agree.    Results from last 7 days   Lab Units 02/10/19  1343   WBC 10*3/mm3 6.61   HEMOGLOBIN g/dL 8.7*   HEMATOCRIT % 28.6*   PLATELETS 10*3/mm3 178     Results from last 7 days   Lab Units 02/10/19  1343 02/10/19  1340   SODIUM mmol/L 131*  --    POTASSIUM mmol/L 4.3  --    CHLORIDE mmol/L 98*  --    CO2 mmol/L 31.0  --    BUN mg/dL 69*  --    CREATININE mg/dL 2.31*  --    GLUCOSE mg/dL 142*  --    CALCIUM mg/dL 8.7  --    ALT (SGPT) U/L 8  --    AST (SGOT) U/L 8  --    TROPONIN I ng/mL  --  0.01     Estimated Creatinine Clearance: 13.8 mL/min (A) (by C-G formula based on SCr of 2.31 mg/dL (H)).  Brief Urine Lab Results  (Last result in the past 365 days)      Color   Clarity   Blood   Leuk Est   Nitrite   Protein   CREAT   Urine HCG        02/10/19 1421 Yellow Clear Negative Negative Negative Negative             No results found for:  BNP  Imaging Results (last 24 hours)     Procedure Component Value Units Date/Time    MRI Brain Without Contrast [[id number]] Collected:  02/10/19 1759     Updated:  02/10/19 1759    Narrative:       EXAMINATION: MRI BRAIN WO CONTRAST - 2/10/2019     INDICATION: R41.82-Altered mental status, unspecified; R47.1-Dysarthria  and anarthria; R55-Syncope and collapse. Mental status change.         TECHNIQUE: Routine multiplanar imaging is obtained of the brain without  the administration of gadolinium contrast.     COMPARISON: NONE     FINDINGS: There is no evidence of restricted diffusion to suggest  evidence of an acute ischemic insult. Flow voids are preserved in the  major intracranial vessels. Visualized paranasal sinuses are grossly  clear. There is fluid seen in the mastoid air cells bilaterally. There  is atrophy identified of the brain. Signal changes are seen scattered  throughout the periventricular and  subcortical white matter suggesting  evidence of chronic small vessel ischemic change. There is no hemorrhage  or hydrocephalus. There is no mass, mass effect, or midline shift. No  hemorrhage or hydrocephalus. No abnormal extra-axial fluid collections  identified. Pituitary and sella reveal no gross abnormality.  Craniovertebral junction is preserved       Impression:       There is mucosal thickening involving the sphenoid sinuses  with fluid in the mastoid air cells bilaterally. Atrophy identified  throughout the brain with chronic small vessel ischemic changes seen  scattered throughout the periventricular and subcortical white matter.  No evidence of acute intracranial abnormality.      DICTATED:   2/10/2019  EDITED/ls :   2/10/2019        XR Chest 1 View [[id number]] Collected:  02/10/19 1557     Updated:  02/10/19 1558    Narrative:          EXAMINATION: XR CHEST 1 VW - 2/10/2019     INDICATION: Weakness, dizziness, altered mental status.     COMPARISON: 1/22/2019     FINDINGS: Portable chest reveals the  heart to be enlarged. Large hiatal  hernia identified. Small right pleural effusion with mild increased  markings at the right lung base. Some improvement seen in aeration of  the right upper lung field. Increased markings as well at the left lung  base.           Impression:       Heart is enlarged with increased markings seen of  the lung  bases bilaterally with small right pleural effusion. Increased pulmonary  vascularity within the lung fields bilaterally.     DICTATED:   2/10/2019  EDITED/ls :   2/10/2019        CT Head Without Contrast [[id number]] Collected:  02/10/19 1531     Updated:  02/10/19 1531    Narrative:       EXAMINATION: CT HEAD WO CONTRAST - 2/10/2019     INDICATION: Mental status change.     TECHNIQUE: Multiple axial CT imaging is obtained of the head from skull  base to skull vertex without the administration of intravenous contrast.     The radiation dose reduction device was turned on for each scan per the  ALARA (As Low as Reasonably Achievable) protocol.     COMPARISON: 6/8/2018     FINDINGS: Mild atrophy identified the brain. Minimal low-density area  seen in the periventricular and subcortical white matter suggesting  chronic small vessel ischemic change. No hemorrhage or hydrocephalus. No  mass, mass effect, or midline shift. No abnormal extra-axial fluid  collections identified. The bony structures reveal no evidence of  osseous abnormality. The visualized paranasal sinuses are with mucosal  thickening in the sphenoid sinuses and ethmoid air cells. The mastoid  air cells are patent.       Impression:       Paranasal sinusitis with mild atrophy and chronic changes  seen of the brain and no acute intracranial abnormality identified.     DICTATED:   2/10/2019  EDITED/ls :   2/10/2019               Results for orders placed during the hospital encounter of 01/18/19   Adult Transthoracic Echo Complete W/ Cont if Necessary Per Protocol    Narrative · There is mild RV enlargement and severe  "tricuspid regurgitation with an   estimated RV systolic pressure of 69mmHg c/w severe pulmonary artery   hypertension.  · There is mild aortic valve sclerosis.  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  · The estimated left ventricular ejection fraction is 46%-50%.  · Agitated saline reveals no evidence of R>L Intracardiac shunting.  · There are no other important findings on this study.          Assessment/Plan   Assessment / Plan     Active Hospital Problems    Diagnosis Date Noted   • **Syncope [R55] 02/10/2019   • Acute renal failure superimposed on chronic kidney disease (CMS/HCC) [N17.9, N18.9] 02/10/2019     Priority: High   • Generalized weakness [R53.1] 02/10/2019     Priority: Medium   • Dysarthria [R47.1] 02/10/2019     Priority: Medium   • Hyponatremia [E87.1] 02/10/2019     Priority: Low   • Pleural effusion on right [J90] 02/10/2019   • Anemia [D64.9] 02/10/2019   • Pulmonary hypertension, moderate to severe (CMS/HCC) [I27.20] 01/20/2019   • CAD (coronary artery disease) [I25.10] 03/31/2018   • Hypothyroidism [E03.9] 03/31/2018   • Type 2 diabetes mellitus (CMS/HCC) [E11.9] 03/31/2018   • Anxiety [F41.9] 03/31/2018       ASSESSMENT AND PLAN:    1.  Syncope   -fall precautions   -echo and carotid in the am   -gentle IV fluids   -orthostatic vital signs   -neurovascular checks   -cbc, bmp, tsh, flp a1c in the am    2.  TIN on CKD   -urine studies   -NS @ 75 ml/hr   -renal ultrasound   -strict I&Os   -bmp in the am    3.  Generalized weakness   -consult pt/ot in the am   -fall precautions   -consult case management in the am   -tsh, b12 in the am    4.  Dysarthria   -per family patient is at baseline   -CT of the head/MRI of the brain negative for acute abnormality   -if speech worsens consider neuro consult    5.  Hyponatremia   -urine sodium and urine osmolality   -IV fluids   -bmp in the am    6.  Anemia   -stable   -anemia profile   -cbc in the " am    7.  T2DM   -a1c in the am   -fsbg with ssi    8.  CAD    9.  Hypothyroidism   -tsh in the am    10.  Anxiety and depression    -continue home meds    11.  Small right pleural effusion   -monitor    12.  Pulmonary HTN      DVT prophylaxis:  heparin    CODE STATUS:    Code Status and Medical Interventions:   Ordered at: 02/10/19 2016     Level Of Support Discussed With:    Patient     Code Status:    CPR     Medical Interventions (Level of Support Prior to Arrest):    Full       Admission Status:  I believe this patient meets OBSERVATION status, however if further evaluation or treatment plans warrant, status may change.  Based upon current information, I predict patient's care encounter to be less than or equal to 2 midnights.      Electronically signed by RADHA Matamoros, 02/10/19, 7:29 PM.          I have seen and evaluated the patient.  I have performed a independent history and physical examination.  I agree with the above assessment and plan.    Cheng Garcia DO  10:20 PM  2/10/19    Electronically signed by Cheng Garcia DO at 2/10/2019 10:21 PM

## 2019-02-13 NOTE — PROGRESS NOTES
"[facility] Progress Note     LOS: 2 days   Patient Care Team:  Rodrigo Martínez MD as PCP - General  Rodrigo Martínez MD as PCP - Family Medicine  PCP:  Rodrigo Martínez MD    Chief Complaint:  chf    SUBJECTIVE: no events      Review of Systems:   All systems have been reviewed and are negative with the exception of those mentioned above.      OBJECTIVE:    Vital Sign Min/Max for last 24 hours  Temp  Min: 98.2 °F (36.8 °C)  Max: 98.2 °F (36.8 °C)   BP  Min: 128/51  Max: 139/77   Pulse  Min: 60  Max: 69   Resp  Min: 18  Max: 18   SpO2  Min: 93 %  Max: 97 %   Flow (L/min)  Min: 2  Max: 3   No Data Recorded     Flowsheet Rows      First Filed Value   Admission Height  160 cm (63\") Documented at 02/10/2019 1319   Admission Weight  52.9 kg (116 lb 9.6 oz) Documented at 02/10/2019 1319          Telemetry: afib 60s-70s      Intake/Output Summary (Last 24 hours) at 2/13/2019 0713  Last data filed at 2/13/2019 0300  Gross per 24 hour   Intake 1297 ml   Output 2150 ml   Net -853 ml     Intake & Output (last 3 days)       02/10 0701 - 02/11 0700 02/11 0701 - 02/12 0700 02/12 0701 - 02/13 0700 02/13 0701 - 02/14 0700    P.O.  960 240     I.V. (mL/kg)  1173 (22.2) 947 (17.9)     IV Piggyback   110     Total Intake(mL/kg)  2133 (40.3) 1297 (24.5)     Urine (mL/kg/hr) 810 1100 (0.9) 2150 (1.7)     Stool   0     Total Output 810 1100 2150     Net -810 +1033 -853             Urine Unmeasured Occurrence  2 x      Stool Unmeasured Occurrence   2 x            Physical Exam:  Physical Exam   Constitutional: She is oriented to person, place, and time. She appears well-developed and well-nourished. No distress.   Cardiovascular: Normal rate, regular rhythm, normal heart sounds and intact distal pulses.   No murmur heard.  Pulses:       Radial pulses are 2+ on the right side, and 2+ on the left side.        Dorsalis pedis pulses are 2+ on the right side, and 2+ on the left side. "        Posterior tibial pulses are 2+ on the right side, and 2+ on the left side.   Pulmonary/Chest: Effort normal and breath sounds normal. She has no wheezes. She has no rales.   Abdominal: Soft. Bowel sounds are normal. There is no tenderness. There is no guarding.   Musculoskeletal: She exhibits no edema or tenderness.   Neurological: She is alert and oriented to person, place, and time.   Skin: Skin is warm and dry. No rash noted.   Psychiatric: She has a normal mood and affect.   Nursing note and vitals reviewed.       LABS/DIAGNOSTIC DATA:  Results from last 7 days   Lab Units 02/13/19  0511 02/12/19  0502 02/11/19  0505   WBC 10*3/mm3 5.66 6.38 7.14   HEMOGLOBIN g/dL 8.0* 7.6* 8.4*   HEMATOCRIT % 26.2* 24.8* 27.6*   PLATELETS 10*3/mm3 147* 142* 177     No results found for: TROPONINT      Results from last 7 days   Lab Units 02/13/19  0511 02/12/19  0502 02/11/19  0505 02/10/19  1343   SODIUM mmol/L 134 136 136 131*   POTASSIUM mmol/L 3.9 3.7 3.6 4.3   CHLORIDE mmol/L 104 105 101 98*   CO2 mmol/L 26.0 26.0 29.0 31.0   BUN mg/dL 46* 53* 62* 69*   CREATININE mg/dL 1.78* 1.93* 2.05* 2.31*   CALCIUM mg/dL 9.0 8.4* 8.8 8.7   BILIRUBIN mg/dL  --   --   --  0.5   ALK PHOS U/L  --   --   --  63   ALT (SGPT) U/L  --   --   --  8   AST (SGOT) U/L  --   --   --  8   GLUCOSE mg/dL 120* 67* 36* 142*     Results from last 7 days   Lab Units 02/11/19  0505   HEMOGLOBIN A1C % 5.70*     Results from last 7 days   Lab Units 02/11/19  0505   CHOLESTEROL mg/dL 118   TRIGLYCERIDES mg/dL 94   HDL CHOL mg/dL 41   LDL CHOL mg/dL 67     Results from last 7 days   Lab Units 02/11/19  0505   TSH mIU/mL 3.293           Medication Review:     bumetanide 1 mg Intravenous Q12H   dicyclomine 10 mg Oral BID   ferric gluconate (FERRLECIT) IVPB 125 mg Intravenous Daily   folic acid 500 mcg Oral Daily   heparin (porcine) 5,000 Units Subcutaneous Q12H   levothyroxine 88 mcg Oral Q AM   lithium carbonate 150 mg Oral Daily   NIFEdipine XL 90 mg  Oral Daily   pantoprazole 40 mg Oral Daily   QUEtiapine 100 mg Oral Nightly   sodium chloride 3 mL Intravenous Q12H              Syncope    Type 2 diabetes mellitus with hypoglycemia (CMS/HCC)    CAD (coronary artery disease)    Valvular heart disease    Hypothyroidism    Acute on chronic congestive heart failure (CMS/HCC)    Pulmonary hypertension, moderate to severe (CMS/HCC)    Acute renal failure superimposed on chronic kidney disease (CMS/HCC)    Pleural effusion on right    Hyponatremia    Anemia    Generalized weakness    Dysarthria    Acute metabolic encephalopathy      ASSESSMENT/PLAN:  A/C Systolic/Diastolic CHF/ Pulm HTN, VHD  - historically had DHF, but upon last echo EF was mildly reduced 45-50% 1/20/19  - difficulty w diuresis w worsening renal function  - IV Bumex, need to switch to PO    Anemia  - chronic iron deficiency  - IV iron per medicine  - family does not want aggressive w/u    A/CKD  - PO lasix d/c'd  - follow serologies     Syncope/Symptomatic Bradycardia?  - beta blocker d/c'd    Afib  - holding a/c d/t severe anemia  - continue to monitor rhythm off beta blocker     Palliative has met with the patient. Pt does not want invasive treatments, is no cpr/intubation. Is hoping to go home w home health to be with her  for their 72nd wedding anniversary on Friday.     Electronically signed by DEAN Rhodes, 02/13/19, 7:12 AM.

## 2019-02-13 NOTE — PROGRESS NOTES
"    [facility] Medicine Services  PROGRESS NOTE    Patient Name: Erma West  : 1929  MRN: [id number]    Date of Admission: 2/10/2019  Length of Stay: 2  Primary Care Physician: Rodrigo Martínez MD    Subjective   Subjective     CC: f/u TIN    HPI: Patient not feeling as well today. Still feels like she is \"smothering.\" Also having significant nausea this am. Wants to go home.    Review of Systems  Gen- No fevers, chills  CV- No chest pain, palpitations    Otherwise ROS is negative except as mentioned in the HPI.    Objective   Objective     Vital Signs:   Temp:  [98.2 °F (36.8 °C)] 98.2 °F (36.8 °C)  Heart Rate:  [62-71] 71  Resp:  [18] 18  BP: (132-139)/(53-77) 139/58        Physical Exam:  Constitutional: No acute distress, more alert but appears to feel more poorly  HENT: NCAT, mucous membranes moist  Respiratory: Clear to auscultation bilaterally, respiratory effort normal   Cardiovascular: RRR, no murmurs, rubs, or gallops, palpable pedal pulses bilaterally  Gastrointestinal: Decreased BS, soft, minimal ttp w/out rebound, mild distension  Musculoskeletal: Right LE w/ skin tear dressed without drainage.  Psychiatric: Appropriate affect, cooperative  Neurologic: Oriented x 3, strength symmetric in all extremities, Cranial Nerves grossly intact to confrontation, speech clear  Skin: No rashes    Results Reviewed:  I have personally reviewed current lab, radiology, and data and agree.    Results from last 7 days   Lab Units 19  0511 19  0502 19  0505   WBC 10*3/mm3 5.66 6.38 7.14   HEMOGLOBIN g/dL 8.0* 7.6* 8.4*   HEMATOCRIT % 26.2* 24.8* 27.6*   PLATELETS 10*3/mm3 147* 142* 177     Results from last 7 days   Lab Units 19  0511 19  0502 19  0505 02/10/19  1343 02/10/19  1340   SODIUM mmol/L 134 136 136 131*  --    POTASSIUM mmol/L 3.9 3.7 3.6 4.3  --    CHLORIDE mmol/L 104 105 101 98*  --    CO2 mmol/L 26.0 26.0 29.0 31.0  --    BUN mg/dL " 46* 53* 62* 69*  --    CREATININE mg/dL 1.78* 1.93* 2.05* 2.31*  --    GLUCOSE mg/dL 120* 67* 36* 142*  --    CALCIUM mg/dL 9.0 8.4* 8.8 8.7  --    ALT (SGPT) U/L  --   --   --  8  --    AST (SGOT) U/L  --   --   --  8  --    TROPONIN I ng/mL  --   --   --   --  0.01     Estimated Creatinine Clearance: 17.9 mL/min (A) (by C-G formula based on SCr of 1.78 mg/dL (H)).    No results found for: BNP    Microbiology Results Abnormal     Procedure Component Value - Date/Time    Eosinophil Smear - Urine, Urine, Clean Catch [[id number]]  (Normal) Collected:  02/11/19 1442    Lab Status:  Final result Specimen:  Urine, Clean Catch Updated:  02/11/19 1612     Eosinophil Smear 0 % EOS/100 Cells     Narrative:       No eosinophil seen        Personally reviewed CXR with small right effusion. Agree with interpretation.   Imaging Results (last 24 hours)     Procedure Component Value Units Date/Time    XR Chest 1 View [[id number]] Collected:  02/13/19 0920     Updated:  02/13/19 1035    Narrative:       EXAMINATION: XR CHEST 1 VW- 02/13/2019      INDICATION: effusion; R41.82-Altered mental status, unspecified;  R47.1-Dysarthria and anarthria; R55-Syncope and collapse; Z74.09-Other  reduced mobility; I50.9-Heart failure, unspecified      COMPARISON: Chest x-ray 02/10/2019     FINDINGS: Cardiac silhouette borderline enlarged with lucent area  overlying the lower chest indicating hiatal hernia. Small right pleural  effusion and probable trace left pleural effusion similar to prior with  persistent interstitial prominence of lung markings however no focal  consolidation or pneumothorax.           Impression:       No significant interval change with small right and trace  left pleural effusions.        D:  02/13/2019  E:  02/13/2019     This report was finalized on 2/13/2019 10:33 AM by Dr. James Obando.             Results for orders placed during the hospital encounter of 01/18/19   Adult Transthoracic Echo Complete W/ Cont if  "Necessary Per Protocol    Narrative · There is mild RV enlargement and severe tricuspid regurgitation with an   estimated RV systolic pressure of 69mmHg c/w severe pulmonary artery   hypertension.  · There is mild aortic valve sclerosis.  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  · The estimated left ventricular ejection fraction is 46%-50%.  · Agitated saline reveals no evidence of R>L Intracardiac shunting.  · There are no other important findings on this study.          I have reviewed the medications:    Current Facility-Administered Medications:   •  acetaminophen (TYLENOL) tablet 650 mg, 650 mg, Oral, Q6H PRN, Diana Murdock APRN  •  bumetanide (BUMEX) injection 1 mg, 1 mg, Intravenous, Q12H, Leilani Loredo APRN, 1 mg at 02/13/19 1022  •  dextrose (D50W) 25 g/ 50mL Intravenous Solution 25 g, 25 g, Intravenous, Q15 Min PRN, Diana Murdock APRN  •  dextrose (GLUTOSE) oral gel 15 g, 15 g, Oral, Q15 Min PRN, Diana Murdock, APRN  •  dicyclomine (BENTYL) capsule 10 mg, 10 mg, Oral, BID, Diana Murdock APRN, 10 mg at 02/13/19 0903  •  folic acid (FOLVITE) tablet 500 mcg, 500 mcg, Oral, Daily, Diana Murdock APRN, 500 mcg at 02/13/19 0901  •  glucagon (human recombinant) (GLUCAGEN DIAGNOSTIC) injection 1 mg, 1 mg, Subcutaneous, PRN, Diana Murdock APRN  •  guaiFENesin (ROBITUSSIN) 100 MG/5ML syrup 200 mg, 200 mg, Oral, TID PRN, Diana Murdock APRN  •  heparin (porcine) 5000 UNIT/ML injection 5,000 Units, 5,000 Units, Subcutaneous, Q12H, Diana Murdock APRN, 5,000 Units at 02/13/19 0901  •  ipratropium-albuterol (DUO-NEB) nebulizer solution 3 mL, 3 mL, Nebulization, Q4H PRN, Diana Murdock APRN  •  levothyroxine (SYNTHROID, LEVOTHROID) tablet 88 mcg, 88 mcg, Oral, Q AM, Diana Murdock, RADHA, 88 mcg at 02/13/19 0608  •  lithium carbonate capsule 150 mg - PATIENT SUPPLIED, 150 mg, Oral, Daily, " Jenn Saldana II, DO, 150 mg at 02/13/19 0901  •  NIFEdipine XL (PROCARDIA XL) 24 hr tablet 90 mg, 90 mg, Oral, Daily, Jenn Saldana II, DO, 90 mg at 02/13/19 0901  •  nitroglycerin (NITROSTAT) SL tablet 0.4 mg, 0.4 mg, Sublingual, Q5 Min PRN, Diana Murdock, APRN  •  ondansetron (ZOFRAN) tablet 4 mg, 4 mg, Oral, Q6H PRN **OR** ondansetron (ZOFRAN) injection 4 mg, 4 mg, Intravenous, Q6H PRN, Diana Murdock APRN, 4 mg at 02/13/19 0856  •  pantoprazole (PROTONIX) EC tablet 40 mg, 40 mg, Oral, Daily, Diana Murdock, APRN, 40 mg at 02/13/19 0901  •  polyethylene glycol 3350 powder (packet), 17 g, Oral, Daily, Jenn Saldana II, DO  •  QUEtiapine (SEROquel) tablet 100 mg, 100 mg, Oral, Nightly, Diana Murdock, APRN, 100 mg at 02/12/19 2047  •  sennosides-docusate sodium (SENOKOT-S) 8.6-50 MG tablet 2 tablet, 2 tablet, Oral, Nightly, Jenn Saldana M II, DO  •  sodium chloride 0.9 % flush 10 mL, 10 mL, Intravenous, PRN, Ede Rios MD  •  sodium chloride 0.9 % flush 3 mL, 3 mL, Intravenous, Q12H, Diana Murdock APRN, 3 mL at 02/13/19 0902  •  sodium chloride 0.9 % flush 3-10 mL, 3-10 mL, Intravenous, PRN, Diana Murdock APRN      Assessment/Plan   Assessment / Plan     Active Hospital Problems    Diagnosis Date Noted   • **Syncope [R55] 02/10/2019   • Acute metabolic encephalopathy [G93.41] 02/11/2019   • Acute renal failure superimposed on chronic kidney disease (CMS/HCC) [N17.9, N18.9] 02/10/2019   • Pleural effusion on right [J90] 02/10/2019   • Hyponatremia [E87.1] 02/10/2019   • Anemia [D64.9] 02/10/2019   • Generalized weakness [R53.1] 02/10/2019   • Dysarthria [R47.1] 02/10/2019   • Pulmonary hypertension, moderate to severe (CMS/HCC) [I27.20] 01/20/2019   • Acute on chronic congestive heart failure (CMS/HCC) [I50.9] 01/18/2019     Echo 1/20/2019:  Interpretation Summary     · There is mild RV enlargement and severe tricuspid regurgitation with an estimated  "RV systolic pressure of 69mmHg c/w severe pulmonary artery hypertension.  · There is mild aortic valve sclerosis.  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  · The estimated left ventricular ejection fraction is 46%-50%.  · Agitated saline reveals no evidence of R>L Intracardiac shunting.  · There are no other important findings on this study          • CAD (coronary artery disease) [I25.10] 03/31/2018   • Hypothyroidism [E03.9] 03/31/2018   • Type 2 diabetes mellitus with hypoglycemia (CMS/HCC) [E11.649] 03/31/2018   • Valvular heart disease [I38] 03/31/2018       Brief Hospital Course to date:  Erma West is a 89 y.o. female admitted from SNF after syncopal episode. Upon arrival she was noted to have TIN/CKD.     A/P:     Metabolic Encephalopathy  Hypoglycemia w/ syncope  --Better. Likely related to hypoglycemia in setting of sulfonurea use and TIN. Hold offending meds and hydrate so as to resolve TIN.  --Has mild persistent hypoglycemia likely related to persistent TIN as above. Give snack at bedtime, d/w nursing. Will plan to stop sulfonurea indefinitely.     TIN/CKD III, likely intra-vascular volume depletion  --TIN/CKD III likely due to increasing diuresis at [facility]. It is better since admission with IVF. When she left our facility her creatinine was 1.05 and it has been steadily increasing since 2/1 coiniciding with increasing PO lasix dosing. Has received IV fluids. Will stop those today now that she is eating/drinking better.     Hypoxia  A/C systolic CHF w/ pleural effusion  VHD  Pulm HTN  --Also per daughter her O2 requirement has increased at [facility], which is supported by CXR. Difficult situation given renal dysfunction as above in setting of known A/C CHF, VHD, pulmonary HTN.  --Doesn't appear that her right sided effusion large enough to tap at this time. Also after further d/w patient she would not wish for this anyway. Will continue " diuresis per cardiology.  --Patient follows with Dr. Durand and was last seen approx 6 months ago. At that time had nl EF, now reduced which is particularly troublesome in light of severe VHD and difficulty w/ diuresis. Likely no good options aside from medical mgmt.  --D/W cardiology PA-C.   --Add low dose of po morphine for dyspnea.      Iron deficiency anemia  --Based on prior studies. May have occult GIB in setting of AC. Agree with stopping AC. Received IV iron x 1 yesterday and c/o nausea and abd pain today. Will stop IV iron as this may have precipitated sx.  --No signs of bleeding. Patient and family not wishing for aggressive eval regarding anemia at this time. Will treat conservatively with increase in PPI dose to bid and adding carafate.     Weakness, debility  GOC  --PT/OT. CM.  --Patient wants to go home at d/c rather than returning to rehab which I think is very reasonable. Will ask palliative to see to assist with GOC. Hopefully home with h/h in 1-2 days.      DVT Prophylaxis: [facility]     Disposition: I expect the patient to be discharged either home with h/h in 1-2 days when symptomatically improving.    Counseling was given to patient and family for the following topics: diagnostic results, prognosis and patient and family education . Total time of the encounter was 55 minutes and 50 minutes was spend counseling.    CODE STATUS:   Code Status and Medical Interventions:   Ordered at: 02/12/19 1257     Limited Support to NOT Include:    Intubation     Level Of Support Discussed With:    Patient     Code Status:    No CPR     Medical Interventions (Level of Support Prior to Arrest):    Limited         Electronically signed by Jenn Saldana II, DO, 02/13/19, 11:43 AM.

## 2019-02-14 NOTE — SIGNIFICANT NOTE
Exam confirms with auscultation zero audible heart tones and zero audible respirations. Mrs. Erma West was pronounced dead at 0251. MD notified by pt's RN.       Temitope Limon RN  Clinical House Supervisor

## 2019-02-14 NOTE — SIGNIFICANT NOTE
Called per nursing secondary to pt decline. Nursing reports increased demand of oxygen with continued hypoxia. Now requiring non-rebreather.   Noted that earlier today, she required bumex. Additionally, nursing reports she recently had nebulizer treatment.   Family at bedside reports that she had chest pain and dyspnea earlier today. Family reports that she did not react well to the Morphine but did respond to the NTG and had improved breathing.     Constitutional: awake, alert  HENT: NCAT, mucous membranes moist  Neck: Supple, no thyromegaly, no lymphadenopathy, trachea midline  Respiratory: coarse wheezes throughout, continued tachypnea   Cardiovascular: RRR, no murmurs, rubs, or gallops, palpable pedal pulses bilaterally  Gastrointestinal: Positive bowel sounds, soft, nontender, distended  Musculoskeletal: No bilateral ankle edema, no clubbing or cyanosis to extremities  Psychiatric: Appropriate affect, cooperative  Neurologic: able to answer questions    CXR ordered  Pt currently due Bumex, requested nurse to give that medication. Hold on Morphine due to pt intolerance earlier today. Give NTG x 1.       2055 Addendum:  Called again per nursing that pt continuing to decline since initial evaluation. Requested nurse to call RRT.     Upon arrival to the room. On call physician at bedside and assumed care.

## 2019-02-14 NOTE — SIGNIFICANT NOTE
RRT called due to hypoxia.  Patient is currently on bipap from previous RRT, currently her oxygen saturation is 76% on 100 % FIO2.  Spoke with family about current code status and patient is approaching end of life.  Son, daughter and son-in law currently at bedside and all in agreeance to keep DNR/DNI and changing to comfort measures only.  Daughter explains that patient does not do well with morphine.  Will given 0.5 IV ativan now, daughter is in agreeance.  called.  Discussed plan with RN and Dr. Borjas.

## 2019-02-24 NOTE — DISCHARGE SUMMARY
[facility] Medicine Services  DEATH SUMMARY    Patient Name: Erma West  : 1929  MRN: [id number]    Date of Admission: 2/10/2019  Date of Death: 2019  Time of Death: 251    Primary Care Physician: Rodrigo Martínez MD    Consults     Date and Time Order Name Status Description    2019 0956 Inpatient Palliative Care MD Consult Completed     2019 1103 Inpatient Cardiology Consult Completed           Summary of Hospital Events     Presenting Problem:   Syncope [R55]  Altered mental status, unspecified altered mental status type [R41.82]    Active Hospital Problems    Diagnosis Date Noted   • **Syncope [R55] 02/10/2019   • Acute metabolic encephalopathy [G93.41] 2019   • Acute renal failure superimposed on chronic kidney disease (CMS/HCC) [N17.9, N18.9] 02/10/2019   • Pleural effusion on right [J90] 02/10/2019   • Hyponatremia [E87.1] 02/10/2019   • Anemia [D64.9] 02/10/2019   • Generalized weakness [R53.1] 02/10/2019   • Dysarthria [R47.1] 02/10/2019   • Pulmonary hypertension, moderate to severe (CMS/HCC) [I27.20] 2019   • Acute on chronic congestive heart failure (CMS/HCC) [I50.9] 2019   • CAD (coronary artery disease) [I25.10] 2018   • Hypothyroidism [E03.9] 2018   • Type 2 diabetes mellitus with hypoglycemia (CMS/HCC) [E11.649] 2018   • Valvular heart disease [I38] 2018      Resolved Hospital Problems   No resolved problems to display.          Hospital Course:  Erma West was a 89 y.o. female initially admitted for syncopal event and TIN likely due to volume depletion.    Official Cause of Death and any directly related diagnoses: Suspected MI causing flash pulmonary edema    Acute hypoxic respiratory failure due to suspected flash pulmonary edema related to likely MI: As stated above patient was initially admitted for syncopal event. She was hydrated with IV fluids and which did improve her creatinine and  blood pressure. Unfortunately due to those IV fluids she developed a/c CHF which was being treated with IV diuretics per cardiology. Patient was not improving much and plan was being put into place to be discharged home with hospice. Unfortunately on night of , patient developed chest pain and subsequent respiratory failure which I can only surmise to be due to flash pulmonary edema from cardiac event. She  the morning of .     Electronically signed by Jenn Saldana II, DO, 19, 1:03 PM.